# Patient Record
Sex: FEMALE | Race: OTHER | Employment: UNEMPLOYED | URBAN - METROPOLITAN AREA
[De-identification: names, ages, dates, MRNs, and addresses within clinical notes are randomized per-mention and may not be internally consistent; named-entity substitution may affect disease eponyms.]

---

## 2016-09-16 LAB
ANTIBODY SCREEN, EXTERNAL: NEGATIVE
CHLAMYDIA, EXTERNAL: NEGATIVE
HBSAG, EXTERNAL: NEGATIVE
HIV, EXTERNAL: NEGATIVE
N. GONORRHEA, EXTERNAL: NEGATIVE
RPR, EXTERNAL: NORMAL
RUBELLA, EXTERNAL: NORMAL
TYPE, ABO & RH, EXTERNAL: NORMAL

## 2017-04-07 ENCOUNTER — HOSPITAL ENCOUNTER (OUTPATIENT)
Age: 32
Setting detail: OBSERVATION
Discharge: HOME | DRG: 540 | End: 2017-04-10
Attending: OBSTETRICS & GYNECOLOGY | Admitting: STUDENT IN AN ORGANIZED HEALTH CARE EDUCATION/TRAINING PROGRAM
Payer: MEDICAID

## 2017-04-07 LAB
ALBUMIN SERPL BCP-MCNC: 2.3 G/DL (ref 3.5–5)
ALBUMIN/GLOB SERPL: 0.6 {RATIO} (ref 1.1–2.2)
ALP SERPL-CCNC: 146 U/L (ref 45–117)
ALT SERPL-CCNC: 12 U/L (ref 12–78)
ANION GAP BLD CALC-SCNC: 12 MMOL/L (ref 5–15)
AST SERPL W P-5'-P-CCNC: 13 U/L (ref 15–37)
BILIRUB SERPL-MCNC: 0.1 MG/DL (ref 0.2–1)
BUN SERPL-MCNC: 6 MG/DL (ref 6–20)
BUN/CREAT SERPL: 10 (ref 12–20)
CALCIUM SERPL-MCNC: 8.2 MG/DL (ref 8.5–10.1)
CHLORIDE SERPL-SCNC: 106 MMOL/L (ref 97–108)
CO2 SERPL-SCNC: 22 MMOL/L (ref 21–32)
CREAT SERPL-MCNC: 0.6 MG/DL (ref 0.55–1.02)
CREAT UR-MCNC: 55.38 MG/DL
ERYTHROCYTE [DISTWIDTH] IN BLOOD BY AUTOMATED COUNT: 14.7 % (ref 11.5–14.5)
GLOBULIN SER CALC-MCNC: 3.6 G/DL (ref 2–4)
GLUCOSE BLD STRIP.AUTO-MCNC: 111 MG/DL (ref 65–100)
GLUCOSE SERPL-MCNC: 111 MG/DL (ref 65–100)
GRBS, EXTERNAL: NEGATIVE
HCT VFR BLD AUTO: 33.8 % (ref 35–47)
HGB BLD-MCNC: 10.8 G/DL (ref 11.5–16)
MCH RBC QN AUTO: 26.4 PG (ref 26–34)
MCHC RBC AUTO-ENTMCNC: 32 G/DL (ref 30–36.5)
MCV RBC AUTO: 82.6 FL (ref 80–99)
PLATELET # BLD AUTO: 211 K/UL (ref 150–400)
POTASSIUM SERPL-SCNC: 3.8 MMOL/L (ref 3.5–5.1)
PROT SERPL-MCNC: 5.9 G/DL (ref 6.4–8.2)
PROT UR-MCNC: 20 MG/DL (ref 0–11.9)
PROT/CREAT UR-RTO: 0.4
RBC # BLD AUTO: 4.09 M/UL (ref 3.8–5.2)
SERVICE CMNT-IMP: ABNORMAL
SODIUM SERPL-SCNC: 140 MMOL/L (ref 136–145)
WBC # BLD AUTO: 9.9 K/UL (ref 3.6–11)

## 2017-04-07 PROCEDURE — 82570 ASSAY OF URINE CREATININE: CPT | Performed by: STUDENT IN AN ORGANIZED HEALTH CARE EDUCATION/TRAINING PROGRAM

## 2017-04-07 PROCEDURE — 77030033269 HC SLV COMPR SCD KNE2 CARD -B

## 2017-04-07 PROCEDURE — 85027 COMPLETE CBC AUTOMATED: CPT | Performed by: OBSTETRICS & GYNECOLOGY

## 2017-04-07 PROCEDURE — 84156 ASSAY OF PROTEIN URINE: CPT | Performed by: STUDENT IN AN ORGANIZED HEALTH CARE EDUCATION/TRAINING PROGRAM

## 2017-04-07 PROCEDURE — 74011250636 HC RX REV CODE- 250/636: Performed by: STUDENT IN AN ORGANIZED HEALTH CARE EDUCATION/TRAINING PROGRAM

## 2017-04-07 PROCEDURE — 99218 HC RM OBSERVATION: CPT

## 2017-04-07 PROCEDURE — 82962 GLUCOSE BLOOD TEST: CPT

## 2017-04-07 PROCEDURE — 74011250637 HC RX REV CODE- 250/637: Performed by: STUDENT IN AN ORGANIZED HEALTH CARE EDUCATION/TRAINING PROGRAM

## 2017-04-07 PROCEDURE — 80053 COMPREHEN METABOLIC PANEL: CPT | Performed by: OBSTETRICS & GYNECOLOGY

## 2017-04-07 PROCEDURE — 84156 ASSAY OF PROTEIN URINE: CPT | Performed by: OBSTETRICS & GYNECOLOGY

## 2017-04-07 PROCEDURE — 36415 COLL VENOUS BLD VENIPUNCTURE: CPT | Performed by: OBSTETRICS & GYNECOLOGY

## 2017-04-07 PROCEDURE — 96372 THER/PROPH/DIAG INJ SC/IM: CPT | Performed by: OBSTETRICS & GYNECOLOGY

## 2017-04-07 PROCEDURE — 74011636637 HC RX REV CODE- 636/637: Performed by: STUDENT IN AN ORGANIZED HEALTH CARE EDUCATION/TRAINING PROGRAM

## 2017-04-07 RX ORDER — ZOLPIDEM TARTRATE 5 MG/1
5 TABLET ORAL
Status: DISCONTINUED | OUTPATIENT
Start: 2017-04-07 | End: 2017-04-10 | Stop reason: HOSPADM

## 2017-04-07 RX ORDER — BETAMETHASONE SODIUM PHOSPHATE AND BETAMETHASONE ACETATE 3; 3 MG/ML; MG/ML
12 INJECTION, SUSPENSION INTRA-ARTICULAR; INTRALESIONAL; INTRAMUSCULAR; SOFT TISSUE EVERY 24 HOURS
Status: COMPLETED | OUTPATIENT
Start: 2017-04-07 | End: 2017-04-08

## 2017-04-07 RX ORDER — SODIUM CHLORIDE, SODIUM LACTATE, POTASSIUM CHLORIDE, CALCIUM CHLORIDE 600; 310; 30; 20 MG/100ML; MG/100ML; MG/100ML; MG/100ML
125 INJECTION, SOLUTION INTRAVENOUS CONTINUOUS
Status: DISCONTINUED | OUTPATIENT
Start: 2017-04-07 | End: 2017-04-10 | Stop reason: HOSPADM

## 2017-04-07 RX ORDER — ADHESIVE BANDAGE
30 BANDAGE TOPICAL DAILY PRN
Status: DISCONTINUED | OUTPATIENT
Start: 2017-04-07 | End: 2017-04-10 | Stop reason: HOSPADM

## 2017-04-07 RX ORDER — ACETAMINOPHEN 325 MG/1
650 TABLET ORAL
Status: DISCONTINUED | OUTPATIENT
Start: 2017-04-07 | End: 2017-04-10 | Stop reason: HOSPADM

## 2017-04-07 RX ADMIN — ZOLPIDEM TARTRATE 5 MG: 5 TABLET, FILM COATED ORAL at 22:12

## 2017-04-07 RX ADMIN — SODIUM CHLORIDE, SODIUM LACTATE, POTASSIUM CHLORIDE, AND CALCIUM CHLORIDE 125 ML/HR: 600; 310; 30; 20 INJECTION, SOLUTION INTRAVENOUS at 16:57

## 2017-04-07 RX ADMIN — HUMAN INSULIN 20 UNITS: 100 INJECTION, SUSPENSION SUBCUTANEOUS at 22:11

## 2017-04-07 RX ADMIN — BETAMETHASONE ACETATE AND BETAMETHASONE SODIUM PHOSPHATE 12 MG: 3; 3 INJECTION, SUSPENSION INTRA-ARTICULAR; INTRALESIONAL; INTRAMUSCULAR; SOFT TISSUE at 19:16

## 2017-04-07 NOTE — IP AVS SNAPSHOT
[address] 
[phone] Patient: Cali Nino MRN: [id number] :1985 You are allergic to the following Allergen Reactions Chlorzoxazone Hives Keflex (Cephalexin) Hives Penicillins Hives Recent Documentation Height Weight Breastfeeding? BMI OB Status Smoking Status 1.676 m 122.7 kg No 43.68 kg/m2 Pregnant Never Smoker Unresulted Labs Order Current Status SAMPLE TO BLOOD BANK In process SAMPLE TO BLOOD BANK In process Emergency Contacts Name Discharge Info Relation Home Work Mobile Kitty Baca  Friend [5] [phone] About your hospitalization You were admitted on:  2017 You last received care in the:  [facility] 2 LABOR & DELIVERY You were discharged on:  April 10, 2017 Unit phone number:  [phone] Why you were hospitalized Your primary diagnosis was:  Not on File Your diagnoses also included:  Pregnancy Providers Seen During Your Hospitalizations Provider Role Specialty Primary office phone Jean-Claude Joe MD Attending Provider Obstetrics & Gynecology [phone] Your Primary Care Physician (PCP) Primary Care Physician Office Phone Office Fax OTHER, PHYS ** None ** ** None ** Follow-up Information Follow up With Details Comments Contact Info Ba Arzate MD   Patient can only remember the practice name and not the physician Current Discharge Medication List  
  
ASK your doctor about these medications Dose & Instructions Dispensing Information Comments Morning Noon Evening Bedtime  
 albuterol 90 mcg/actuation inhaler Commonly known as:  PROVENTIL HFA, VENTOLIN HFA, PROAIR HFA Your last dose was: Your next dose is: Take  by inhalation. Refills:  0 HumuLIN N Pen 100 unit/mL (3 mL) Inpn Generic drug:  insulin NPH Your last dose was: Your next dose is:    
   
   
 Dose:  20 Units 20 Units by SubCUTAneous route nightly. Indications: Gestational Diabetes Mellitus Refills:  0  
     
   
   
   
  
 pnv w/o calcium-iron fum-fa 27-1 mg Tab Your last dose was: Your next dose is: Take  by mouth. Refills:  0 Discharge Instructions Home Blood Glucose Test: About This Test 
What is it? A home blood glucose test measures the amount of a type of sugar, called glucose, in your blood. Why is this test done? People who have diabetes need to check the amount of glucose in their blood. A home blood glucose test is an easy way to test your blood at home or when you are away from home. The results help you know when to take action to keep your blood glucose levels in a target range. How can you prepare for the test? 
· Check the expiration date on the bottle of testing strips. Do not use test strips that have . · Match the code number on the testing strips bottle with the number on the meter. If the numbers do not match, follow the directions with the meter for changing the code number. What happens before the test? 
The supplies you will need for testing blood glucose include: · A blood glucose meter. · Testing strips. These are made to be used with a specific model of meter. · Sugar control solutions. Some meters require a specific solution. Many new meters are made to operate without a control solution. · Short needles called lancets for pricking your skin. · A pen-sized cruz for the lancet (lancet device), which positions the lancet and controls how deeply it goes into your skin. · Clean cotton balls. These are used to stop the bleeding from the testing site.  
What happens during the test? 
 A home blood glucose test involves pricking your finger, palm, or forearm with a lancet to collect a drop of blood. The blood drop is placed on a test strip, which you insert into the blood glucose meter. The instructions for testing are slightly different for each blood glucose meter model. Follow the instructions that came with your meter. · Wash your hands with warm, soapy water. Dry them well with a clean towel. You may also use an alcohol wipe to clean your finger or other site, but make sure your hands are dry before the test. 
· Insert a clean lancet into the lancet device. · Remove a test strip from the test strip bottle. Replace the lid immediately to keep moisture away from the other strips. · Follow the instructions that came with your meter to get it ready. · Use the lancet device to stick the side of your fingertip with the lancet. Do not stick the tip of your finger. Some blood sugar meters use lancet devices that take the blood sample from other sites, such as the palm of the hand or the forearm. But the finger is usually the most accurate place to test blood sugar. · Put a drop of blood on the correct spot on the test strip. · Apply pressure with a clean cotton ball to stop the bleeding. · Follow the directions that came with the meter to get the results. · Write down the results and the time that you tested your blood. Some meters will store the results for you. What else should you know about the test? 
The American Diabetes Association (ADA) recommends that you stay within the following blood glucose level ranges. But depending on your health, you and your doctor may set a different range for you. For nonpregnant adults with diabetes: 
· 80 milligrams per deciliter (mg/dL) to 130 mg/dL before a meal 
· Less than 180 mg/dL 1 to 2 hours after a meal 
For women who have diabetes related to pregnancy (gestational diabetes): · 95 mg/dL or less before breakfast 
 · 120 to 140 mg/dL (or lower) 1 to 2 hours after a meal 
How long does the test take? · The blood glucose meter will show the results of the test in a minute or less. Where can you learn more? Go to http://adrianne-soheila.info/. Enter S564 in the search box to learn more about \"Home Blood Glucose Test: About This Test.\" Current as of: May 23, 2016 Content Version: 11.2 © 7001-7407 [employer]. Care instructions adapted under license by "[employer] (which disclaims liability or warranty for this information). If you have questions about a medical condition or this instruction, always ask your healthcare professional. [place] [address] any warranty or liability for your use of this information. Learning About When to Call Your Doctor During Pregnancy (After 20 Weeks) Your Care Instructions It's common to have concerns about what might be a problem during pregnancy. Although most pregnant women don't have any serious problems, it's important to know when to call your doctor if you have certain symptoms or signs of labor. These are general suggestions. Your doctor may give you some more information about when to call. When to call your doctor (after 20 weeks) Call 911 anytime you think you may need emergency care. For example, call if: 
· You have severe vaginal bleeding. · You have sudden, severe pain in your belly. · You passed out (lost consciousness). · You have a seizure. · You see or feel the umbilical cord. · You think you are about to deliver your baby and can't make it safely to the hospital. 
Call your doctor now or seek immediate medical care if: 
· You have vaginal bleeding. · You have belly pain. · You have a fever. · You have symptoms of preeclampsia, such as: 
¨ Sudden swelling of your face, hands, or feet. ¨ New vision problems (such as dimness or blurring). ¨ A severe headache. · You have a sudden release of fluid from your vagina. (You think your water broke.) · You think that you may be in labor. This means that you've had at least 4 contractions within 20 minutes or at least 8 contractions in an hour. · You notice that your baby has stopped moving or is moving much less than normal. 
· You have symptoms of a urinary tract infection. These may include: 
¨ Pain or burning when you urinate. ¨ A frequent need to urinate without being able to pass much urine. ¨ Pain in the flank, which is just below the rib cage and above the waist on either side of the back. ¨ Blood in your urine. Watch closely for changes in your health, and be sure to contact your doctor if: 
· You have vaginal discharge that smells bad. · You have skin changes, such as: ¨ A rash. ¨ Itching. ¨ Yellow color to your skin. · You have other concerns about your pregnancy. If you have labor signs at 37 weeks or more If you have signs of labor at 37 weeks or more, your doctor may tell you to call when your labor becomes more active. Symptoms of active labor include: 
· Contractions that are regular. · Contractions that are less than 5 minutes apart. · Contractions that are hard to talk through. Follow-up care is a key part of your treatment and safety. Be sure to make and go to all appointments, and call your doctor if you are having problems. It's also a good idea to know your test results and keep a list of the medicines you take. Where can you learn more? Go to http://adrianne-soheila.info/. Enter  in the search box to learn more about \"Learning About When to Call Your Doctor During Pregnancy (After 20 Weeks). \" 
Current as of: May 30, 2016 Content Version: 11.2 © 9652-9640 [employer]. Care instructions adapted under license by [employer] (which disclaims liability or warranty for this information).  If you have questions about a medical condition or this instruction, always ask your healthcare professional. [place] [address] any warranty or liability for your use of this information. Preeclampsia: Care Instructions Your Care Instructions Preeclampsia occurs when a woman's blood pressure rises during pregnancy. Often with preeclampsia, you also have swelling in your legs, hands, and face. A test may show too much protein in your urine. Preeclampsia is also called toxemia. If preeclampsia is severe and not treated, it can lead to seizures (eclampsia) and damage to your liver or kidneys. Preeclampsia can prevent your baby from getting enough food and oxygen. This can cause a low birth weight or other problems. Your doctor will watch you closely to prevent these problems. He or she also may recommend that you rest in bed most of the day. If your preeclampsia is a danger to your health or the health of your baby, your doctor may need to deliver your baby early. While preeclampsia is a concern, most women with preeclampsia have healthy babies. After a woman gives birth, preeclampsia usually goes away on its own. Follow-up care is a key part of your treatment and safety. Be sure to make and go to all appointments, and call your doctor if you are having problems. It's also a good idea to know your test results and keep a list of the medicines you take. How can you care for yourself at home? · Take and record your blood pressure at home if your doctor tells you to. ¨ Learn the importance of the two measures of blood pressure (such as 120 over 80, or 120/80). The first number is the systolic pressure, which is the force of blood on the artery walls as the heart pumps. The second number is the diastolic pressure, which is the force of blood on the artery walls between heartbeats, when the heart is at rest. You have a choice of monitors to use. ¨ Manual monitor: You pump up the cuff and use a stethoscope to listen for your pulse. ¨ Electronic monitor: The cuff inflates, and a gauge shows your pulse rate. ¨ To take your blood pressure: ¨ Ask your doctor to check your blood pressure monitor to be sure that it is accurate and that the cuff fits you. Also ask your doctor to watch you to make sure that you are using it right. ¨ You should not eat, use tobacco products, or use medicine known to raise blood pressure (such as some nasal decongestant sprays) before you take your blood pressure. ¨ Avoid taking your blood pressure if you have just exercised or are nervous or upset. Rest at least 15 minutes before you take your blood pressure. · If your doctor advises, check the protein levels in your urine. Your doctor or nurse will show you how to do this. · Take your medicines exactly as prescribed. Call your doctor if you think you are having a problem with your medicine. · Do not smoke. Quitting smoking will help lower your blood pressure and improve your baby's growth and health. If you need help quitting, talk to your doctor about stop-smoking programs and medicines. These can increase your chances of quitting for good. · Eat a balanced and healthy diet that has lots of fruits and vegetables. · If your doctor advised bed rest, be sure to stay off your feet and rest as much as possible. ¨ Keep a phone, phone book, notepad, and pen near the bed where you can easily reach them. ¨ Gently stretch your legs every hour to maintain good blood flow. ¨ Have another family member pack snacks and lunch food in a cooler close to your bed. ¨ Use this time for activities that you usually cannot find time for, such as reading, craft projects, or letter writing. · You can keep track of your baby's health by noting the length of time it takes to count 10 movements (such as kicks, flutters, or rolls).  Feeling 10 movements in less than 1 hour is considered normal. Track your baby's movements once each day, and bring this record with you to each prenatal visit. When should you call for help? Call 911 anytime you think you may need emergency care. For example, call if: 
· You have a seizure. · You passed out (lost consciousness). · You have severe vaginal bleeding. · You have severe pain in your belly or pelvis. · You have had fluid gushing or leaking from your vagina and you know or think the umbilical cord is bulging into your vagina. If this happens, immediately get down on your knees so your rear end (buttocks) is higher than your head. This will decrease the pressure on the cord until help arrives. Call your doctor now or seek immediate medical care if: 
· You have sudden swelling of your face, hands, or feet. · You have new vision problems (such as dimness or blurring). · You have a severe headache. · You have any vaginal bleeding. · You have belly pain or cramping. · You have a fever. · You have had regular contractions (with or without pain) for an hour. This means that you have 8 or more within 1 hour or 4 or more in 20 minutes after you change your position and drink fluids. · You have a sudden release of fluid from the vagina. · You have low back pain or pelvic pressure that does not go away. · You notice that your baby has stopped moving or is moving much less than normal. 
Watch closely for changes in your health, and be sure to contact your doctor if you have any questions. Where can you learn more? Go to http://adrianne-soheila.info/. Enter R814 in the search box to learn more about \"Preeclampsia: Care Instructions. \" Current as of: May 30, 2016 Content Version: 11.2 © 0537-6539 [employer]. Care instructions adapted under license by [employer] (which disclaims liability or warranty for this information).  If you have questions about a medical condition or this instruction, always ask your healthcare professional. [place] [address] any warranty or liability for your use of this information. Discharge Orders None Introducing [facility]! Pablo Mcbride introduces Appuri patient portal. Now you can access parts of your medical record, email your doctor's office, and request medication refills online. 1. In your internet browser, go to [email or web address] 2. Click on the First Time User? Click Here link in the Sign In box. You will see the New Member Sign Up page. 3. Enter your Appuri Access Code exactly as it appears below. You will not need to use this code after youve completed the sign-up process. If you do not sign up before the expiration date, you must request a new code. · Appuri Access Code: [email or web address] Expires: 6/27/2017 10:20 AM 
 
4. Enter the last four digits of your Social Security Number (xxxx) and Date of Birth (mm/dd/yyyy) as indicated and click Submit. You will be taken to the next sign-up page. 5. Create a Appuri ID. This will be your Appuri login ID and cannot be changed, so think of one that is secure and easy to remember. 6. Create a Appuri password. You can change your password at any time. 7. Enter your Password Reset Question and Answer. This can be used at a later time if you forget your password. 8. Enter your e-mail address. You will receive e-mail notification when new information is available in [address]. 9. Click Sign Up. You can now view and download portions of your medical record. 10. Click the Download Summary menu link to download a portable copy of your medical information. If you have questions, please visit the Frequently Asked Questions section of the Appuri website. Remember, Appuri is NOT to be used for urgent needs. For medical emergencies, dial 911. Now available from your iPhone and Android! General Information Please provide this summary of care documentation to your next provider. Patient Signature:  ____________________________________________________________ Date:  ____________________________________________________________  
  
[place] Favorite Provider Signature:  ____________________________________________________________ Date:  ____________________________________________________________

## 2017-04-07 NOTE — IP AVS SNAPSHOT
Summary of Care Report The Summary of Care report has been created to help improve care coordination. Users with access to [employer] or [address] (Web-based application) may access additional patient information including the Discharge Summary. If you are not currently a [address] user and need more information, please call the number listed below in the [place] [address] section and ask to be connected with Medical Records. Facility Information Name Address Phone [address] [facility] [address] [phone] Patient Information Patient Name Sex  Yumiko Sheridan ([id number]) Female 1985 Discharge Information Admitting Provider Service Area Unit Trudi Garcia,  / [phone] [facility] 2 Labor & Delivery / [phone] Discharge Provider Discharge Date/Time Discharge Disposition Destination (none) 4/10/2017 Midday (Pending) AHR (none) Patient Language Language ENGLISH [13] Hospital Problems as of 4/10/2017  Never Reviewed Class Noted - Resolved Last Modified POA Active Problems Pregnancy  2017 - Present 2017 by Trudi Garcia, DO Unknown Entered by Trudi Garcia, DO You are allergic to the following Allergen Reactions Chlorzoxazone Hives Keflex (Cephalexin) Hives Penicillins Hives Current Discharge Medication List  
  
ASK your doctor about these medications Dose & Instructions Dispensing Information Comments  
 albuterol 90 mcg/actuation inhaler Commonly known as:  PROVENTIL HFA, VENTOLIN HFA, PROAIR HFA Take  by inhalation. Refills:  0 HumuLIN N Pen 100 unit/mL (3 mL) Inpn Generic drug:  insulin NPH Dose:  20 Units 20 Units by SubCUTAneous route nightly.  Indications: Gestational Diabetes Mellitus Refills:  0  
   
 pnv w/o calcium-iron fum-fa 27-1 mg Tab Take  by mouth. Refills:  0 Follow-up Information Follow up With Details Comments Contact Anisa Arzate MD   Patient can only remember the practice name and not the physician Discharge Instructions Home Blood Glucose Test: About This Test 
What is it? A home blood glucose test measures the amount of a type of sugar, called glucose, in your blood. Why is this test done? People who have diabetes need to check the amount of glucose in their blood. A home blood glucose test is an easy way to test your blood at home or when you are away from home. The results help you know when to take action to keep your blood glucose levels in a target range. How can you prepare for the test? 
· Check the expiration date on the bottle of testing strips. Do not use test strips that have . · Match the code number on the testing strips bottle with the number on the meter. If the numbers do not match, follow the directions with the meter for changing the code number. What happens before the test? 
The supplies you will need for testing blood glucose include: · A blood glucose meter. · Testing strips. These are made to be used with a specific model of meter. · Sugar control solutions. Some meters require a specific solution. Many new meters are made to operate without a control solution. · Short needles called lancets for pricking your skin. · A pen-sized cruz for the lancet (lancet device), which positions the lancet and controls how deeply it goes into your skin. · Clean cotton balls. These are used to stop the bleeding from the testing site. What happens during the test? 
A home blood glucose test involves pricking your finger, palm, or forearm with a lancet to collect a drop of blood. The blood drop is placed on a test strip, which you insert into the blood glucose meter.  The instructions for testing are slightly different for each blood glucose meter model. Follow the instructions that came with your meter. · Wash your hands with warm, soapy water. Dry them well with a clean towel. You may also use an alcohol wipe to clean your finger or other site, but make sure your hands are dry before the test. 
· Insert a clean lancet into the lancet device. · Remove a test strip from the test strip bottle. Replace the lid immediately to keep moisture away from the other strips. · Follow the instructions that came with your meter to get it ready. · Use the lancet device to stick the side of your fingertip with the lancet. Do not stick the tip of your finger. Some blood sugar meters use lancet devices that take the blood sample from other sites, such as the palm of the hand or the forearm. But the finger is usually the most accurate place to test blood sugar. · Put a drop of blood on the correct spot on the test strip. · Apply pressure with a clean cotton ball to stop the bleeding. · Follow the directions that came with the meter to get the results. · Write down the results and the time that you tested your blood. Some meters will store the results for you. What else should you know about the test? 
The American Diabetes Association (ADA) recommends that you stay within the following blood glucose level ranges. But depending on your health, you and your doctor may set a different range for you. For nonpregnant adults with diabetes: 
· 80 milligrams per deciliter (mg/dL) to 130 mg/dL before a meal 
· Less than 180 mg/dL 1 to 2 hours after a meal 
For women who have diabetes related to pregnancy (gestational diabetes): · 95 mg/dL or less before breakfast 
· 120 to 140 mg/dL (or lower) 1 to 2 hours after a meal 
How long does the test take? · The blood glucose meter will show the results of the test in a minute or less. Where can you learn more? Go to http://adrianne-soheila.info/. Enter C929 in the search box to learn more about \"Home Blood Glucose Test: About This Test.\" Current as of: May 23, 2016 Content Version: 11.2 © 6086-2128 [employer]. Care instructions adapted under license by [employer] (which disclaims liability or warranty for this information). If you have questions about a medical condition or this instruction, always ask your healthcare professional. [address] any warranty or liability for your use of this information. Learning About When to Call Your Doctor During Pregnancy (After 20 Weeks) Your Care Instructions It's common to have concerns about what might be a problem during pregnancy. Although most pregnant women don't have any serious problems, it's important to know when to call your doctor if you have certain symptoms or signs of labor. These are general suggestions. Your doctor may give you some more information about when to call. When to call your doctor (after 20 weeks) Call 911 anytime you think you may need emergency care. For example, call if: 
· You have severe vaginal bleeding. · You have sudden, severe pain in your belly. · You passed out (lost consciousness). · You have a seizure. · You see or feel the umbilical cord. · You think you are about to deliver your baby and can't make it safely to the hospital. 
Call your doctor now or seek immediate medical care if: 
· You have vaginal bleeding. · You have belly pain. · You have a fever. · You have symptoms of preeclampsia, such as: 
¨ Sudden swelling of your face, hands, or feet. ¨ New vision problems (such as dimness or blurring). ¨ A severe headache. · You have a sudden release of fluid from your vagina. (You think your water broke.) · You think that you may be in labor. This means that you've had at least 4 contractions within 20 minutes or at least 8 contractions in an hour. · You notice that your baby has stopped moving or is moving much less than normal. 
· You have symptoms of a urinary tract infection. These may include: 
¨ Pain or burning when you urinate. ¨ A frequent need to urinate without being able to pass much urine. ¨ Pain in the flank, which is just below the rib cage and above the waist on either side of the back. ¨ Blood in your urine. Watch closely for changes in your health, and be sure to contact your doctor if: 
· You have vaginal discharge that smells bad. · You have skin changes, such as: ¨ A rash. ¨ Itching. ¨ Yellow color to your skin. · You have other concerns about your pregnancy. If you have labor signs at 37 weeks or more If you have signs of labor at 37 weeks or more, your doctor may tell you to call when your labor becomes more active. Symptoms of active labor include: 
· Contractions that are regular. · Contractions that are less than 5 minutes apart. · Contractions that are hard to talk through. Follow-up care is a key part of your treatment and safety. Be sure to make and go to all appointments, and call your doctor if you are having problems. It's also a good idea to know your test results and keep a list of the medicines you take. Where can you learn more? Go to http://adrianne-soheila.info/. Enter  in the search box to learn more about \"Learning About When to Call Your Doctor During Pregnancy (After 20 Weeks). \" 
Current as of: May 30, 2016 Content Version: 11.2 © 9219-1844 [employer]. Care instructions adapted under license by [employer] (which disclaims liability or warranty for this information). If you have questions about a medical condition or this instruction, always ask your healthcare professional. [place] [address] any warranty or liability for your use of this information. Preeclampsia: Care Instructions Your Care Instructions Preeclampsia occurs when a woman's blood pressure rises during pregnancy. Often with preeclampsia, you also have swelling in your legs, hands, and face. A test may show too much protein in your urine. Preeclampsia is also called toxemia. If preeclampsia is severe and not treated, it can lead to seizures (eclampsia) and damage to your liver or kidneys. Preeclampsia can prevent your baby from getting enough food and oxygen. This can cause a low birth weight or other problems. Your doctor will watch you closely to prevent these problems. He or she also may recommend that you rest in bed most of the day. If your preeclampsia is a danger to your health or the health of your baby, your doctor may need to deliver your baby early. While preeclampsia is a concern, most women with preeclampsia have healthy babies. After a woman gives birth, preeclampsia usually goes away on its own. Follow-up care is a key part of your treatment and safety. Be sure to make and go to all appointments, and call your doctor if you are having problems. It's also a good idea to know your test results and keep a list of the medicines you take. How can you care for yourself at home? · Take and record your blood pressure at home if your doctor tells you to. ¨ Learn the importance of the two measures of blood pressure (such as 120 over 80, or 120/80). The first number is the systolic pressure, which is the force of blood on the artery walls as the heart pumps. The second number is the diastolic pressure, which is the force of blood on the artery walls between heartbeats, when the heart is at rest. You have a choice of monitors to use. ¨ Manual monitor: You pump up the cuff and use a stethoscope to listen for your pulse. ¨ Electronic monitor: The cuff inflates, and a gauge shows your pulse rate. ¨ To take your blood pressure: ¨ Ask your doctor to check your blood pressure monitor to be sure that it is accurate and that the cuff fits you. Also ask your doctor to watch you to make sure that you are using it right. ¨ You should not eat, use tobacco products, or use medicine known to raise blood pressure (such as some nasal decongestant sprays) before you take your blood pressure. ¨ Avoid taking your blood pressure if you have just exercised or are nervous or upset. Rest at least 15 minutes before you take your blood pressure. · If your doctor advises, check the protein levels in your urine. Your doctor or nurse will show you how to do this. · Take your medicines exactly as prescribed. Call your doctor if you think you are having a problem with your medicine. · Do not smoke. Quitting smoking will help lower your blood pressure and improve your baby's growth and health. If you need help quitting, talk to your doctor about stop-smoking programs and medicines. These can increase your chances of quitting for good. · Eat a balanced and healthy diet that has lots of fruits and vegetables. · If your doctor advised bed rest, be sure to stay off your feet and rest as much as possible. ¨ Keep a phone, phone book, notepad, and pen near the bed where you can easily reach them. ¨ Gently stretch your legs every hour to maintain good blood flow. ¨ Have another family member pack snacks and lunch food in a cooler close to your bed. ¨ Use this time for activities that you usually cannot find time for, such as reading, craft projects, or letter writing. · You can keep track of your baby's health by noting the length of time it takes to count 10 movements (such as kicks, flutters, or rolls). Feeling 10 movements in less than 1 hour is considered normal. Track your baby's movements once each day, and bring this record with you to each prenatal visit. When should you call for help? Call 911 anytime you think you may need emergency care. For example, call if: 
· You have a seizure. · You passed out (lost consciousness). · You have severe vaginal bleeding. · You have severe pain in your belly or pelvis. · You have had fluid gushing or leaking from your vagina and you know or think the umbilical cord is bulging into your vagina. If this happens, immediately get down on your knees so your rear end (buttocks) is higher than your head. This will decrease the pressure on the cord until help arrives. Call your doctor now or seek immediate medical care if: 
· You have sudden swelling of your face, hands, or feet. · You have new vision problems (such as dimness or blurring). · You have a severe headache. · You have any vaginal bleeding. · You have belly pain or cramping. · You have a fever. · You have had regular contractions (with or without pain) for an hour. This means that you have 8 or more within 1 hour or 4 or more in 20 minutes after you change your position and drink fluids. · You have a sudden release of fluid from the vagina. · You have low back pain or pelvic pressure that does not go away. · You notice that your baby has stopped moving or is moving much less than normal. 
Watch closely for changes in your health, and be sure to contact your doctor if you have any questions. Where can you learn more? Go to http://adrianne-soheila.info/. Enter B887 in the search box to learn more about \"Preeclampsia: Care Instructions. \" Current as of: May 30, 2016 Content Version: 11.2 © 3035-3521 [employer]. Care instructions adapted under license by [employer] (which disclaims liability or warranty for this information). If you have questions about a medical condition or this instruction, always ask your healthcare professional. [place] [address] any warranty or liability for your use of this information. Chart Review Routing History No Routing History on File

## 2017-04-07 NOTE — PROGRESS NOTES
Pt here for elevated blood pressure in office, pt placed on efm    1542 pt up to bathroom and voided, adjusting efm several times, difficult to monitor due to fetal movement. Pt states she has had very little water today and has not eaten, and has not taken her blood sugar today    1648 prolonged deceleration noted, pt turned to left side.     1651 iv fluid bolus started    1705 Dr. Sol Mail of prolonged deceleration and interventions performed, baseline 135 at this time    1835 Dr. Geovanni Armstrong in and discussing plan of care with pt, aware pt was laying on b/p cuff x 3 blood pressures and b/p cuff was readjusted and b/p retaken    1852 bedside sbar report tammi Leal rnc

## 2017-04-07 NOTE — H&P
Obstetrics Admission H&P    Jaylon Chung  [id number]  1985    Chief Complaint:  Pregnancy and Elevated BPs     HPI: 33 y.o. female  36w0d with Estimated Date of Delivery: 17  Pregnancy has been complicated by GDM on NPH at bedtime and obesity. Patient presented to the office today for routine 36wk appt, BPs noted to be elevated in the mild range. She was sent to L&D for monitoring. Patient denies headache, vision changes, RUQ pain, epigastric pain, or worsening edema. Reports PP BGLs are WNL, only fasting elevated. ROS:  A comprehensive review of systems was negative except for that written in the HPI. OB History      Para Term  AB TAB SAB Ectopic Multiple Living    2 1 1               Past Medical History:   Diagnosis Date    Asthma     Diabetes ([facility] [address]     gestational diabetes    Essential hypertension     seen today for elevated blood pressure    Staph aureus infection 2006    over abdomen     Past Surgical History:   Procedure Laterality Date    BREAST SURGERY PROCEDURE UNLISTED      HX BREAST REDUCTION      HX CHOLECYSTECTOMY      HX TONSILLECTOMY       Social History     Social History    Marital status: SINGLE     Spouse name: N/A    Number of children: N/A    Years of education: N/A     Occupational History    Not on file. Social History Main Topics    Smoking status: Never Smoker    Smokeless tobacco: Not on file    Alcohol use No    Drug use: No    Sexual activity: Yes     Partners: Male     Other Topics Concern    Not on file     Social History Narrative     No family history on file. Allergies   Allergen Reactions    Chlorzoxazone Hives    Keflex [Cephalexin] Hives    Penicillins Hives     Prior to Admission Medications   Prescriptions Last Dose Informant Patient Reported? Taking? albuterol (PROVENTIL HFA, VENTOLIN HFA) 90 mcg/actuation inhaler Unknown at Unknown time  Yes No   Sig: Take  by inhalation.    insulin NPH (HUMULIN N PEN) 100 unit/mL (3 mL) inpn 2017 at Unknown time  Yes Yes   Si Units by SubCUTAneous route nightly. Indications: Gestational Diabetes Mellitus   pnv w/o calcium-iron fum-fa 27-1 mg tab 2017 at Unknown time  Yes Yes   Sig: Take  by mouth. Facility-Administered Medications: None         Vitals:  Patient Vitals for the past 8 hrs:   BP Temp Pulse Resp SpO2 Height Weight   17 1737 143/66 - 75 - - - -   17 1733 (!) 152/93 - 67 - 99 % - -   17 1718 (!) 153/92 - 76 - 100 % - -   17 1703 - - - - 99 % - -   17 1658 - - - - 99 % - -   17 1653 - - - - 99 % - -   17 1649 140/74 - 78 - - - -   17 1634 117/69 - 74 - - - -   17 1619 137/68 - 76 - - - -   17 1603 128/77 - 72 - - - -   17 1549 144/72 98.8 °F (37.1 °C) 76 16 - - -   17 1521 - - - - - 5' 6\" (1.676 m) 122.7 kg (270 lb 9.6 oz)     Temp (24hrs), Av.8 °F (37.1 °C), Min:98.8 °F (37.1 °C), Max:98.8 °F (37.1 °C)    I&O:                    Exam:  Patient without distress. Abdomen soft, non-tender               Fundus soft and non tender               Perineum No sign of blood or amniotic fluid               Lower extremities edema No           Cervical Exam:  In the office today by Dr. Milo Albrecht ftp dilated  Membranes:   Intact    Fetal Heart Rate: Reactive  Baseline: 135 per minute  Variability: moderate  Accelerations: yes  Decelerations: single prolonged decel to 80s x 3min -recovered with position changes  Uterine Activity: irritability          Labs:   Recent Results (from the past 24 hour(s))   PROTEIN/CREATININE RATIO, URINE    Collection Time: 17  3:40 PM   Result Value Ref Range    Protein, urine random 20 (H) 0.0 - 11.9 mg/dL    Creatinine, urine 55.38 mg/dL    Protein/Creat.  urine Ratio 0.4     CBC W/O DIFF    Collection Time: 17  3:40 PM   Result Value Ref Range    WBC 9.9 3.6 - 11.0 K/uL    RBC 4.09 3.80 - 5.20 M/uL    HGB 10.8 (L) 11.5 - 16.0 g/dL    HCT 33.8 (L) 35.0 - 47.0 %    MCV 82.6 80.0 - 99.0 FL    MCH 26.4 26.0 - 34.0 PG    MCHC 32.0 30.0 - 36.5 g/dL    RDW 14.7 (H) 11.5 - 14.5 %    PLATELET 704 905 - 422 K/uL   METABOLIC PANEL, COMPREHENSIVE    Collection Time: 17  3:40 PM   Result Value Ref Range    Sodium 140 136 - 145 mmol/L    Potassium 3.8 3.5 - 5.1 mmol/L    Chloride 106 97 - 108 mmol/L    CO2 22 21 - 32 mmol/L    Anion gap 12 5 - 15 mmol/L    Glucose 111 (H) 65 - 100 mg/dL    BUN 6 6 - 20 MG/DL    Creatinine 0.60 0.55 - 1.02 MG/DL    BUN/Creatinine ratio 10 (L) 12 - 20      GFR est AA >60 >60 ml/min/1.73m2    GFR est non-AA >60 >60 ml/min/1.73m2    Calcium 8.2 (L) 8.5 - 10.1 MG/DL    Bilirubin, total 0.1 (L) 0.2 - 1.0 MG/DL    ALT (SGPT) 12 12 - 78 U/L    AST (SGOT) 13 (L) 15 - 37 U/L    Alk. phosphatase 146 (H) 45 - 117 U/L    Protein, total 5.9 (L) 6.4 - 8.2 g/dL    Albumin 2.3 (L) 3.5 - 5.0 g/dL    Globulin 3.6 2.0 - 4.0 g/dL    A-G Ratio 0.6 (L) 1.1 - 2.2         Assessment and Plan:  32yo  at 36w 0d -rule in for Pre-E without severe features based on mild range BPs and Pr:Cr ratio of 0.4     1. Preeclampsia without severe features: continue observation of BPs  Complete 48 hour course of steroids to promote fetal lung maturity  Check 24 hour urine for total Protein  Continuous Fetal monitoring until further notice     2. GDM -random BGL 111mg/dL -continue NPH 20u at bedtime -diabetic diet, check BGLs fasting and 2hr PP TID   -May need sliding scale coverage due to [address] over next few days     Dispo: continue inpatient management of Pre-E without severe features for now, will plan to deliver for si/sx of severe Pre-E or fetal distress, otherwise will try to delay delivery until 37w    Discussed plan of care with patient, discussed possibility of need for induction, risks, benefit and alternatives. Questions answered.      Iris Loud, DO

## 2017-04-07 NOTE — IP AVS SNAPSHOT
Current Discharge Medication List  
  
ASK your doctor about these medications Dose & Instructions Dispensing Information Comments Morning Noon Evening Bedtime  
 albuterol 90 mcg/actuation inhaler Commonly known as:  PROVENTIL HFA, VENTOLIN HFA, PROAIR HFA Your last dose was: Your next dose is: Take  by inhalation. Refills:  0 HumuLIN N Pen 100 unit/mL (3 mL) Inpn Generic drug:  insulin NPH Your last dose was: Your next dose is:    
   
   
 Dose:  20 Units 20 Units by SubCUTAneous route nightly. Indications: Gestational Diabetes Mellitus Refills:  0  
     
   
   
   
  
 pnv w/o calcium-iron fum-fa 27-1 mg Tab Your last dose was: Your next dose is: Take  by mouth. Refills:  0

## 2017-04-08 PROBLEM — Z34.90 PREGNANCY: Status: ACTIVE | Noted: 2017-04-08

## 2017-04-08 LAB
COLLECT DURATION TIME UR: 24 HR
COLLECT DURATION TIME UR: 24 HR
CREAT 24H UR-MRATE: 1709 MG/24HR (ref 600–1800)
GLUCOSE BLD STRIP.AUTO-MCNC: 116 MG/DL (ref 65–100)
GLUCOSE BLD STRIP.AUTO-MCNC: 125 MG/DL (ref 65–100)
GLUCOSE BLD STRIP.AUTO-MCNC: 140 MG/DL (ref 65–100)
GLUCOSE BLD STRIP.AUTO-MCNC: 163 MG/DL (ref 65–100)
GLUCOSE BLD STRIP.AUTO-MCNC: 346 MG/DL (ref 65–100)
GLUCOSE BLD STRIP.AUTO-MCNC: 93 MG/DL (ref 65–100)
PROT 24H UR-MRATE: 552 MG/24HR
SERVICE CMNT-IMP: ABNORMAL
SERVICE CMNT-IMP: NORMAL
SPECIMEN VOL ?TM UR: 2400 ML
SPECIMEN VOL ?TM UR: 2400 ML

## 2017-04-08 PROCEDURE — 65270000029 HC RM PRIVATE

## 2017-04-08 PROCEDURE — 74011250636 HC RX REV CODE- 250/636: Performed by: STUDENT IN AN ORGANIZED HEALTH CARE EDUCATION/TRAINING PROGRAM

## 2017-04-08 PROCEDURE — 96372 THER/PROPH/DIAG INJ SC/IM: CPT

## 2017-04-08 PROCEDURE — 82962 GLUCOSE BLOOD TEST: CPT

## 2017-04-08 PROCEDURE — 74011250637 HC RX REV CODE- 250/637: Performed by: STUDENT IN AN ORGANIZED HEALTH CARE EDUCATION/TRAINING PROGRAM

## 2017-04-08 PROCEDURE — 99218 HC RM OBSERVATION: CPT

## 2017-04-08 PROCEDURE — 74011636637 HC RX REV CODE- 636/637: Performed by: STUDENT IN AN ORGANIZED HEALTH CARE EDUCATION/TRAINING PROGRAM

## 2017-04-08 RX ORDER — DEXTROSE 50 % IN WATER (D50W) INTRAVENOUS SYRINGE
12.5-25 AS NEEDED
Status: DISCONTINUED | OUTPATIENT
Start: 2017-04-08 | End: 2017-04-10 | Stop reason: HOSPADM

## 2017-04-08 RX ORDER — INSULIN LISPRO 100 [IU]/ML
INJECTION, SOLUTION INTRAVENOUS; SUBCUTANEOUS
Status: DISCONTINUED | OUTPATIENT
Start: 2017-04-08 | End: 2017-04-10 | Stop reason: HOSPADM

## 2017-04-08 RX ORDER — MAGNESIUM SULFATE 100 %
4 CRYSTALS MISCELLANEOUS AS NEEDED
Status: DISCONTINUED | OUTPATIENT
Start: 2017-04-08 | End: 2017-04-10 | Stop reason: HOSPADM

## 2017-04-08 RX ADMIN — INSULIN LISPRO 2 UNITS: 100 INJECTION, SOLUTION INTRAVENOUS; SUBCUTANEOUS at 11:31

## 2017-04-08 RX ADMIN — BETAMETHASONE ACETATE AND BETAMETHASONE SODIUM PHOSPHATE 12 MG: 3; 3 INJECTION, SUSPENSION INTRA-ARTICULAR; INTRALESIONAL; INTRAMUSCULAR; SOFT TISSUE at 19:03

## 2017-04-08 RX ADMIN — ZOLPIDEM TARTRATE 5 MG: 5 TABLET, FILM COATED ORAL at 22:14

## 2017-04-08 RX ADMIN — HUMAN INSULIN 6 UNITS: 100 INJECTION, SOLUTION SUBCUTANEOUS at 06:19

## 2017-04-08 RX ADMIN — HUMAN INSULIN 20 UNITS: 100 INJECTION, SUSPENSION SUBCUTANEOUS at 22:12

## 2017-04-08 NOTE — PROGRESS NOTES
AntePartum Progress Note    Bertrum Dose  [id number]    Patient admitted for Pre-E without severe features 36w1d Estimated Date of Delivery: 17 states she does not  have  headache , right upper quadrant pain   and swelling. Vitals:  Patient Vitals for the past 24 hrs:   BP Temp Pulse Resp SpO2 Height Weight   17 0820 118/63 98.1 °F (36.7 °C) 64 16 - - -   17 0817 115/61 - 69 - - - -   17 1930 - - - - 98 % - -   17 1927 159/78 98.2 °F (36.8 °C) 82 18 - - -   17 1737 143/66 - 75 - - - -   17 1733 (!) 152/93 - 67 - 99 % - -   17 1718 (!) 153/92 - 76 - 100 % - -   17 1703 - - - - 99 % - -   17 1658 - - - - 99 % - -   17 1653 - - - - 99 % - -   17 1649 140/74 - 78 - - - -   17 1634 117/69 - 74 - - - -   17 1619 137/68 - 76 - - - -   17 1603 128/77 - 72 - - - -   17 1549 144/72 98.8 °F (37.1 °C) 76 16 - - -   17 1521 - - - - - 5' 6\" (1.676 m) 122.7 kg (270 lb 9.6 oz)     Temp (24hrs), Av.4 °F (36.9 °C), Min:98.1 °F (36.7 °C), Max:98.8 °F (37.1 °C)    NST:  Reactive  Uterine Activity: None    Exam:  Patient without distress. Abdomen soft, non-tender               Fundus soft and non tender               Lower extremities edema No                         Labs:   Recent Results (from the past 24 hour(s))   PROTEIN/CREATININE RATIO, URINE    Collection Time: 17  3:40 PM   Result Value Ref Range    Protein, urine random 20 (H) 0.0 - 11.9 mg/dL    Creatinine, urine 55.38 mg/dL    Protein/Creat.  urine Ratio 0.4     CBC W/O DIFF    Collection Time: 17  3:40 PM   Result Value Ref Range    WBC 9.9 3.6 - 11.0 K/uL    RBC 4.09 3.80 - 5.20 M/uL    HGB 10.8 (L) 11.5 - 16.0 g/dL    HCT 33.8 (L) 35.0 - 47.0 %    MCV 82.6 80.0 - 99.0 FL    MCH 26.4 26.0 - 34.0 PG    MCHC 32.0 30.0 - 36.5 g/dL    RDW 14.7 (H) 11.5 - 14.5 %    PLATELET 039 182 - 971 K/uL   METABOLIC PANEL, COMPREHENSIVE    Collection Time: 17  3:40 PM   Result Value Ref Range    Sodium 140 136 - 145 mmol/L    Potassium 3.8 3.5 - 5.1 mmol/L    Chloride 106 97 - 108 mmol/L    CO2 22 21 - 32 mmol/L    Anion gap 12 5 - 15 mmol/L    Glucose 111 (H) 65 - 100 mg/dL    BUN 6 6 - 20 MG/DL    Creatinine 0.60 0.55 - 1.02 MG/DL    BUN/Creatinine ratio 10 (L) 12 - 20      GFR est AA >60 >60 ml/min/1.73m2    GFR est non-AA >60 >60 ml/min/1.73m2    Calcium 8.2 (L) 8.5 - 10.1 MG/DL    Bilirubin, total 0.1 (L) 0.2 - 1.0 MG/DL    ALT (SGPT) 12 12 - 78 U/L    AST (SGOT) 13 (L) 15 - 37 U/L    Alk. phosphatase 146 (H) 45 - 117 U/L    Protein, total 5.9 (L) 6.4 - 8.2 g/dL    Albumin 2.3 (L) 3.5 - 5.0 g/dL    Globulin 3.6 2.0 - 4.0 g/dL    A-G Ratio 0.6 (L) 1.1 - 2.2     GLUCOSE, POC    Collection Time: 17  8:16 PM   Result Value Ref Range    Glucose (POC) 111 (H) 65 - 100 mg/dL    Performed by TINY Fuchs    GLUCOSE, POC    Collection Time: 17  5:55 AM   Result Value Ref Range    Glucose (POC) 346 (H) 65 - 100 mg/dL    Performed by TINY Fuchs        Assessment and Plan:  32yo  with IUP @ 36w1d     32yo  at 36w 1d -rule in for Pre-E without severe features based on mild range BPs and Pr:Cr ratio of 0.4      1. Preeclampsia without severe features: BPs have been normotensive to mild range  continue observation of BPs  Complete 48 hour course of steroids to promote fetal lung maturity  Check 24 hour urine for total Protein  NST TID      2.  GDM -BGL elevated fasting 346 this AM after BMZ   -continue NPH 20u at bedtime -diabetic diet, check BGLs fasting and AC and 2hr PP TID   -SS lispro added     Dispo: continue inpatient management of Pre-E without severe features for now, will plan to deliver for si/sx of severe Pre-E or fetal distress, otherwise will try to delay delivery until 37w

## 2017-04-08 NOTE — PROGRESS NOTES
9480: Dr. Patsy Howard notified of patients FBS of 346. Orders received for insulin now. MD will put in additional orders later. 1711: SBAR report given to HERBERT Kim [facility]

## 2017-04-08 NOTE — PROGRESS NOTES
Report received from tammi Leal rn    1964 pt ate yogurt and toast/jelly for breakfast this am, yogurt was for a snack, pt states she does not like eggs. Pt is on gdm diet. [id number] Dr Brenda Juarez in, discussed plan of care with pt    1916 bedside sbar report given to lluvia Mays rn

## 2017-04-09 LAB
GLUCOSE BLD STRIP.AUTO-MCNC: 120 MG/DL (ref 65–100)
GLUCOSE BLD STRIP.AUTO-MCNC: 132 MG/DL (ref 65–100)
GLUCOSE BLD STRIP.AUTO-MCNC: 145 MG/DL (ref 65–100)
GLUCOSE BLD STRIP.AUTO-MCNC: 149 MG/DL (ref 65–100)
GLUCOSE BLD STRIP.AUTO-MCNC: 149 MG/DL (ref 65–100)
SERVICE CMNT-IMP: ABNORMAL

## 2017-04-09 PROCEDURE — 74011636637 HC RX REV CODE- 636/637: Performed by: STUDENT IN AN ORGANIZED HEALTH CARE EDUCATION/TRAINING PROGRAM

## 2017-04-09 PROCEDURE — 74011250637 HC RX REV CODE- 250/637: Performed by: STUDENT IN AN ORGANIZED HEALTH CARE EDUCATION/TRAINING PROGRAM

## 2017-04-09 PROCEDURE — 82962 GLUCOSE BLOOD TEST: CPT

## 2017-04-09 PROCEDURE — 65270000029 HC RM PRIVATE

## 2017-04-09 PROCEDURE — 99218 HC RM OBSERVATION: CPT

## 2017-04-09 RX ORDER — SODIUM CHLORIDE 0.9 % (FLUSH) 0.9 %
5-10 SYRINGE (ML) INJECTION EVERY 8 HOURS
Status: DISCONTINUED | OUTPATIENT
Start: 2017-04-09 | End: 2017-04-10 | Stop reason: HOSPADM

## 2017-04-09 RX ADMIN — HUMAN INSULIN 20 UNITS: 100 INJECTION, SUSPENSION SUBCUTANEOUS at 22:06

## 2017-04-09 RX ADMIN — INSULIN LISPRO 2 UNITS: 100 INJECTION, SOLUTION INTRAVENOUS; SUBCUTANEOUS at 15:06

## 2017-04-09 RX ADMIN — INSULIN LISPRO 2 UNITS: 100 INJECTION, SOLUTION INTRAVENOUS; SUBCUTANEOUS at 10:15

## 2017-04-09 RX ADMIN — Medication 10 ML: at 15:10

## 2017-04-09 RX ADMIN — ZOLPIDEM TARTRATE 5 MG: 5 TABLET, FILM COATED ORAL at 22:06

## 2017-04-09 NOTE — PROGRESS NOTES
AntePartum Progress Note    Gabriella Tamez  [id number]    Patient admitted for Pre-E without severe features 36w2d Estimated Date of Delivery: 17 states she does not  have  headache , right upper quadrant pain   and swelling. Vitals:    Patient Vitals for the past 24 hrs:   BP Pulse   17 0609 149/79 71   17 2212 142/65 70   17 1923 127/68 71     No data recorded. NST:  Reactive  Uterine Activity: None    Exam:  Patient without distress. Abdomen soft, non-tender               Fundus soft and non tender               Lower extremities edema No                         Labs:   Recent Results (from the past 24 hour(s))   GLUCOSE, POC    Collection Time: 17 11:19 AM   Result Value Ref Range    Glucose (POC) 140 (H) 65 - 100 mg/dL    Performed by Javier FaceRig    GLUCOSE, POC    Collection Time: 17 12:54 PM   Result Value Ref Range    Glucose (POC) 93 65 - 100 mg/dL    Performed by Javier FaceRig    GLUCOSE, POC    Collection Time: 17  3:27 PM   Result Value Ref Range    Glucose (POC) 116 (H) 65 - 100 mg/dL    Performed by Javier FaceRig    GLUCOSE, POC    Collection Time: 17  6:11 PM   Result Value Ref Range    Glucose (POC) 125 (H) 65 - 100 mg/dL    Performed by Javier FaceRig    GLUCOSE, POC    Collection Time: 17  8:40 PM   Result Value Ref Range    Glucose (POC) 163 (H) 65 - 100 mg/dL    Performed by Jem Age    GLUCOSE, POC    Collection Time: 17  6:11 AM   Result Value Ref Range    Glucose (POC) 132 (H) 65 - 100 mg/dL    Performed by Jem Age    GLUCOSE, POC    Collection Time: 17  7:56 AM   Result Value Ref Range    Glucose (POC) 120 (H) 65 - 100 mg/dL    Performed by Melecio [place] and Plan:  30yo  with IUP @ 36w2d     30yo  at 36w 2d -ruled in for Pre-E without severe features based on mild range BPs and 24hr urine protein 552mg     1.  Preeclampsia without severe features: BPs have been normotensive to mild range  continue observation of BPs  s/p 48 hour course of steroids to promote fetal lung maturity  NST TID   CBC and CMP normal on admission, repeat tomorrow AM   MFM for BPP and EFW tomorrow   GBS pending from the office on 4/7      2. GDM -BGL fasting 132 this AM  -continue NPH 20u at bedtime -diabetic diet, checking BGLs fasting and 2hr PP tid   -SS lispro coverage     Dispo: continue inpatient management of Pre-E without severe features for now, will plan to deliver for si/sx of severe Pre-E or fetal distress, otherwise will try to delay delivery until 37w (on books for Friday 4/14, coming in for osei bulb the night before).  May be candidate for outpatient management, will make decision after MFM evaluation tomorrow     Felix Alvarado DO

## 2017-04-09 NOTE — PROGRESS NOTES
Bedside and Verbal shift change report given to Mariela Bush (oncoming nurse) by Edgar De La Fuente (offgoing nurse). Report included the following information SBAR, MAR and Recent Results.

## 2017-04-09 NOTE — PROGRESS NOTES
2012  Called Dr. Vita Parikh to notify RN of pt's 24 hour urine result. Left message for MD to call L&D unit. 2016  Spoke with Dr. Vita Parikh. MD made aware of pt's 24 hour urine result of 552, and that pt continues to deny symptoms of pre-e, including HA, vision changes and epigastric pain. MD also made aware that pt's evening BP was 127/68, and that evening NST was reactive. TORB from MD to inform pt that she will be staying in the hospital at least until Monday. No additional orders at this time. 2046  Pt informed that her 24 hour urine result was 552. Pt informed that Dr. Vita Parikh said that she will stay in the hospital until at least Monday. Pt and pt's  verbalize understanding. Pt and pt's  deny any new needs/complaints at this time. Call bell within reach of pt.  2216  Bedtime insulin and ambien given to pt. Pt awake laying in bed at this time. Pt's  asleep on bedside cot. No visible signs of distress noted in  at this time. Pt educated on signs and symptoms of hypoglycemia and pre-eclampsia. Pt educated to call RN if she feels symptomatic of hypoglycemia, or symptomatic of pre-eclampsia at any time during the night. Pt verbalizes understanding. Pt also educated on purposeful rounding. Pt verbalizes understanding at this time. Call bell within reach of pt.  0706  Bedside and Verbal shift change report given to Osvaldo Abernathy RN (oncoming nurse) by Moises Bose RN (offgoing nurse). Report included the following information SBAR, Kardex, Intake/Output, MAR, Recent Results and Med Rec Status.

## 2017-04-10 VITALS
RESPIRATION RATE: 17 BRPM | HEART RATE: 69 BPM | WEIGHT: 270.6 LBS | DIASTOLIC BLOOD PRESSURE: 63 MMHG | SYSTOLIC BLOOD PRESSURE: 109 MMHG | OXYGEN SATURATION: 98 % | HEIGHT: 66 IN | TEMPERATURE: 98.1 F | BODY MASS INDEX: 43.49 KG/M2

## 2017-04-10 LAB
ALBUMIN SERPL BCP-MCNC: 2.3 G/DL (ref 3.5–5)
ALBUMIN/GLOB SERPL: 0.7 {RATIO} (ref 1.1–2.2)
ALP SERPL-CCNC: 133 U/L (ref 45–117)
ALT SERPL-CCNC: 10 U/L (ref 12–78)
ANION GAP BLD CALC-SCNC: 11 MMOL/L (ref 5–15)
AST SERPL W P-5'-P-CCNC: 9 U/L (ref 15–37)
BILIRUB SERPL-MCNC: 0.1 MG/DL (ref 0.2–1)
BUN SERPL-MCNC: 11 MG/DL (ref 6–20)
BUN/CREAT SERPL: 20 (ref 12–20)
CALCIUM SERPL-MCNC: 8.2 MG/DL (ref 8.5–10.1)
CHLORIDE SERPL-SCNC: 105 MMOL/L (ref 97–108)
CO2 SERPL-SCNC: 23 MMOL/L (ref 21–32)
CREAT SERPL-MCNC: 0.55 MG/DL (ref 0.55–1.02)
ERYTHROCYTE [DISTWIDTH] IN BLOOD BY AUTOMATED COUNT: 15.1 % (ref 11.5–14.5)
GLOBULIN SER CALC-MCNC: 3.5 G/DL (ref 2–4)
GLUCOSE BLD STRIP.AUTO-MCNC: 107 MG/DL (ref 65–100)
GLUCOSE BLD STRIP.AUTO-MCNC: 112 MG/DL (ref 65–100)
GLUCOSE SERPL-MCNC: 107 MG/DL (ref 65–100)
HCT VFR BLD AUTO: 33.8 % (ref 35–47)
HGB BLD-MCNC: 10.6 G/DL (ref 11.5–16)
LDH SERPL L TO P-CCNC: 126 U/L (ref 81–246)
MCH RBC QN AUTO: 26.2 PG (ref 26–34)
MCHC RBC AUTO-ENTMCNC: 31.4 G/DL (ref 30–36.5)
MCV RBC AUTO: 83.7 FL (ref 80–99)
PLATELET # BLD AUTO: 205 K/UL (ref 150–400)
POTASSIUM SERPL-SCNC: 3.8 MMOL/L (ref 3.5–5.1)
PROT SERPL-MCNC: 5.8 G/DL (ref 6.4–8.2)
RBC # BLD AUTO: 4.04 M/UL (ref 3.8–5.2)
SERVICE CMNT-IMP: ABNORMAL
SERVICE CMNT-IMP: ABNORMAL
SODIUM SERPL-SCNC: 139 MMOL/L (ref 136–145)
URATE SERPL-MCNC: 5.1 MG/DL (ref 2.6–6)
WBC # BLD AUTO: 11.1 K/UL (ref 3.6–11)

## 2017-04-10 PROCEDURE — 76817 TRANSVAGINAL US OBSTETRIC: CPT | Performed by: OBSTETRICS & GYNECOLOGY

## 2017-04-10 PROCEDURE — 83615 LACTATE (LD) (LDH) ENZYME: CPT | Performed by: STUDENT IN AN ORGANIZED HEALTH CARE EDUCATION/TRAINING PROGRAM

## 2017-04-10 PROCEDURE — 76819 FETAL BIOPHYS PROFIL W/O NST: CPT | Performed by: OBSTETRICS & GYNECOLOGY

## 2017-04-10 PROCEDURE — 96360 HYDRATION IV INFUSION INIT: CPT | Performed by: OBSTETRICS & GYNECOLOGY

## 2017-04-10 PROCEDURE — 76805 OB US >/= 14 WKS SNGL FETUS: CPT | Performed by: OBSTETRICS & GYNECOLOGY

## 2017-04-10 PROCEDURE — 85027 COMPLETE CBC AUTOMATED: CPT | Performed by: STUDENT IN AN ORGANIZED HEALTH CARE EDUCATION/TRAINING PROGRAM

## 2017-04-10 PROCEDURE — 99218 HC RM OBSERVATION: CPT

## 2017-04-10 PROCEDURE — 80053 COMPREHEN METABOLIC PANEL: CPT | Performed by: STUDENT IN AN ORGANIZED HEALTH CARE EDUCATION/TRAINING PROGRAM

## 2017-04-10 PROCEDURE — 82962 GLUCOSE BLOOD TEST: CPT

## 2017-04-10 PROCEDURE — 99285 EMERGENCY DEPT VISIT HI MDM: CPT | Performed by: OBSTETRICS & GYNECOLOGY

## 2017-04-10 PROCEDURE — 96372 THER/PROPH/DIAG INJ SC/IM: CPT | Performed by: OBSTETRICS & GYNECOLOGY

## 2017-04-10 PROCEDURE — 36415 COLL VENOUS BLD VENIPUNCTURE: CPT | Performed by: STUDENT IN AN ORGANIZED HEALTH CARE EDUCATION/TRAINING PROGRAM

## 2017-04-10 PROCEDURE — 59025 FETAL NON-STRESS TEST: CPT | Performed by: OBSTETRICS & GYNECOLOGY

## 2017-04-10 PROCEDURE — 84550 ASSAY OF BLOOD/URIC ACID: CPT | Performed by: STUDENT IN AN ORGANIZED HEALTH CARE EDUCATION/TRAINING PROGRAM

## 2017-04-10 NOTE — DISCHARGE INSTRUCTIONS
Home Blood Glucose Test: About This Test  What is it? A home blood glucose test measures the amount of a type of sugar, called glucose, in your blood. Why is this test done? People who have diabetes need to check the amount of glucose in their blood. A home blood glucose test is an easy way to test your blood at home or when you are away from home. The results help you know when to take action to keep your blood glucose levels in a target range. How can you prepare for the test?  · Check the expiration date on the bottle of testing strips. Do not use test strips that have . · Match the code number on the testing strips bottle with the number on the meter. If the numbers do not match, follow the directions with the meter for changing the code number. What happens before the test?  The supplies you will need for testing blood glucose include:  · A blood glucose meter. · Testing strips. These are made to be used with a specific model of meter. · Sugar control solutions. Some meters require a specific solution. Many new meters are made to operate without a control solution. · Short needles called lancets for pricking your skin. · A pen-sized rcuz for the lancet (lancet device), which positions the lancet and controls how deeply it goes into your skin. · Clean cotton balls. These are used to stop the bleeding from the testing site. What happens during the test?  A home blood glucose test involves pricking your finger, palm, or forearm with a lancet to collect a drop of blood. The blood drop is placed on a test strip, which you insert into the blood glucose meter. The instructions for testing are slightly different for each blood glucose meter model. Follow the instructions that came with your meter. · Wash your hands with warm, soapy water. Dry them well with a clean towel.  You may also use an alcohol wipe to clean your finger or other site, but make sure your hands are dry before the test.  · Insert a clean lancet into the lancet device. · Remove a test strip from the test strip bottle. Replace the lid immediately to keep moisture away from the other strips. · Follow the instructions that came with your meter to get it ready. · Use the lancet device to stick the side of your fingertip with the lancet. Do not stick the tip of your finger. Some blood sugar meters use lancet devices that take the blood sample from other sites, such as the palm of the hand or the forearm. But the finger is usually the most accurate place to test blood sugar. · Put a drop of blood on the correct spot on the test strip. · Apply pressure with a clean cotton ball to stop the bleeding. · Follow the directions that came with the meter to get the results. · Write down the results and the time that you tested your blood. Some meters will store the results for you. What else should you know about the test?  The American Diabetes Association (ADA) recommends that you stay within the following blood glucose level ranges. But depending on your health, you and your doctor may set a different range for you. For nonpregnant adults with diabetes:  · 80 milligrams per deciliter (mg/dL) to 130 mg/dL before a meal  · Less than 180 mg/dL 1 to 2 hours after a meal  For women who have diabetes related to pregnancy (gestational diabetes):  · 95 mg/dL or less before breakfast  · 120 to 140 mg/dL (or lower) 1 to 2 hours after a meal  How long does the test take? · The blood glucose meter will show the results of the test in a minute or less. Where can you learn more? Go to http://adrianne-soheila.info/. Enter V665 in the search box to learn more about \"Home Blood Glucose Test: About This Test.\"  Current as of: May 23, 2016  Content Version: 11.2  © 1652-2895 [employer]. Care instructions adapted under license by [employer] (which disclaims liability or warranty for this information).  If you have questions about a medical condition or this instruction, always ask your healthcare professional. [place] [address] any warranty or liability for your use of this information. Learning About When to Call Your Doctor During Pregnancy (After 20 Weeks)  Your Care Instructions  It's common to have concerns about what might be a problem during pregnancy. Although most pregnant women don't have any serious problems, it's important to know when to call your doctor if you have certain symptoms or signs of labor. These are general suggestions. Your doctor may give you some more information about when to call. When to call your doctor (after 20 weeks)  Call 911 anytime you think you may need emergency care. For example, call if:  · You have severe vaginal bleeding. · You have sudden, severe pain in your belly. · You passed out (lost consciousness). · You have a seizure. · You see or feel the umbilical cord. · You think you are about to deliver your baby and can't make it safely to the hospital.  Call your doctor now or seek immediate medical care if:  · You have vaginal bleeding. · You have belly pain. · You have a fever. · You have symptoms of preeclampsia, such as:  ¨ Sudden swelling of your face, hands, or feet. ¨ New vision problems (such as dimness or blurring). ¨ A severe headache. · You have a sudden release of fluid from your vagina. (You think your water broke.)  · You think that you may be in labor. This means that you've had at least 4 contractions within 20 minutes or at least 8 contractions in an hour. · You notice that your baby has stopped moving or is moving much less than normal.  · You have symptoms of a urinary tract infection. These may include:  ¨ Pain or burning when you urinate. ¨ A frequent need to urinate without being able to pass much urine. ¨ Pain in the flank, which is just below the rib cage and above the waist on either side of the back.   ¨ Blood in your urine. Watch closely for changes in your health, and be sure to contact your doctor if:  · You have vaginal discharge that smells bad. · You have skin changes, such as:  ¨ A rash. ¨ Itching. ¨ Yellow color to your skin. · You have other concerns about your pregnancy. If you have labor signs at 37 weeks or more  If you have signs of labor at 37 weeks or more, your doctor may tell you to call when your labor becomes more active. Symptoms of active labor include:  · Contractions that are regular. · Contractions that are less than 5 minutes apart. · Contractions that are hard to talk through. Follow-up care is a key part of your treatment and safety. Be sure to make and go to all appointments, and call your doctor if you are having problems. It's also a good idea to know your test results and keep a list of the medicines you take. Where can you learn more? Go to http://adrianne-soheila.info/. Enter  in the search box to learn more about \"Learning About When to Call Your Doctor During Pregnancy (After 20 Weeks). \"  Current as of: May 30, 2016  Content Version: 11.2  © 4705-1975 [employer]. Care instructions adapted under license by [employer] (which disclaims liability or warranty for this information). If you have questions about a medical condition or this instruction, always ask your healthcare professional. [place] [address] any warranty or liability for your use of this information. Preeclampsia: Care Instructions  Your Care Instructions  Preeclampsia occurs when a woman's blood pressure rises during pregnancy. Often with preeclampsia, you also have swelling in your legs, hands, and face. A test may show too much protein in your urine. Preeclampsia is also called toxemia. If preeclampsia is severe and not treated, it can lead to seizures (eclampsia) and damage to your liver or kidneys.   Preeclampsia can prevent your baby from getting enough food and oxygen. This can cause a low birth weight or other problems. Your doctor will watch you closely to prevent these problems. He or she also may recommend that you rest in bed most of the day. If your preeclampsia is a danger to your health or the health of your baby, your doctor may need to deliver your baby early. While preeclampsia is a concern, most women with preeclampsia have healthy babies. After a woman gives birth, preeclampsia usually goes away on its own. Follow-up care is a key part of your treatment and safety. Be sure to make and go to all appointments, and call your doctor if you are having problems. It's also a good idea to know your test results and keep a list of the medicines you take. How can you care for yourself at home? · Take and record your blood pressure at home if your doctor tells you to. ¨ Learn the importance of the two measures of blood pressure (such as 120 over 80, or 120/80). The first number is the systolic pressure, which is the force of blood on the artery walls as the heart pumps. The second number is the diastolic pressure, which is the force of blood on the artery walls between heartbeats, when the heart is at rest. You have a choice of monitors to use. ¨ Manual monitor: You pump up the cuff and use a stethoscope to listen for your pulse. ¨ Electronic monitor: The cuff inflates, and a gauge shows your pulse rate. ¨ To take your blood pressure:  ¨ Ask your doctor to check your blood pressure monitor to be sure that it is accurate and that the cuff fits you. Also ask your doctor to watch you to make sure that you are using it right. ¨ You should not eat, use tobacco products, or use medicine known to raise blood pressure (such as some nasal decongestant sprays) before you take your blood pressure. ¨ Avoid taking your blood pressure if you have just exercised or are nervous or upset. Rest at least 15 minutes before you take your blood pressure.   · If your doctor advises, check the protein levels in your urine. Your doctor or nurse will show you how to do this. · Take your medicines exactly as prescribed. Call your doctor if you think you are having a problem with your medicine. · Do not smoke. Quitting smoking will help lower your blood pressure and improve your baby's growth and health. If you need help quitting, talk to your doctor about stop-smoking programs and medicines. These can increase your chances of quitting for good. · Eat a balanced and healthy diet that has lots of fruits and vegetables. · If your doctor advised bed rest, be sure to stay off your feet and rest as much as possible. ¨ Keep a phone, phone book, notepad, and pen near the bed where you can easily reach them. ¨ Gently stretch your legs every hour to maintain good blood flow. ¨ Have another family member pack snacks and lunch food in a cooler close to your bed. ¨ Use this time for activities that you usually cannot find time for, such as reading, craft projects, or letter writing. · You can keep track of your baby's health by noting the length of time it takes to count 10 movements (such as kicks, flutters, or rolls). Feeling 10 movements in less than 1 hour is considered normal. Track your baby's movements once each day, and bring this record with you to each prenatal visit. When should you call for help? Call 911 anytime you think you may need emergency care. For example, call if:  · You have a seizure. · You passed out (lost consciousness). · You have severe vaginal bleeding. · You have severe pain in your belly or pelvis. · You have had fluid gushing or leaking from your vagina and you know or think the umbilical cord is bulging into your vagina. If this happens, immediately get down on your knees so your rear end (buttocks) is higher than your head. This will decrease the pressure on the cord until help arrives.   Call your doctor now or seek immediate medical care if:  · You have sudden swelling of your face, hands, or feet. · You have new vision problems (such as dimness or blurring). · You have a severe headache. · You have any vaginal bleeding. · You have belly pain or cramping. · You have a fever. · You have had regular contractions (with or without pain) for an hour. This means that you have 8 or more within 1 hour or 4 or more in 20 minutes after you change your position and drink fluids. · You have a sudden release of fluid from the vagina. · You have low back pain or pelvic pressure that does not go away. · You notice that your baby has stopped moving or is moving much less than normal.  Watch closely for changes in your health, and be sure to contact your doctor if you have any questions. Where can you learn more? Go to http://adrianne-soheila.info/. Enter D913 in the search box to learn more about \"Preeclampsia: Care Instructions. \"  Current as of: May 30, 2016  Content Version: 11.2  © 9943-6424 [employer]. Care instructions adapted under license by [employer] (which disclaims liability or warranty for this information). If you have questions about a medical condition or this instruction, always ask your healthcare professional. [address] any warranty or liability for your use of this information.

## 2017-04-10 NOTE — CONSULTS
Maternal-Fetal Medicine    Ms. Brenda Ghosh P1 at 36w 3d gestation, was admitted with increased blood pressures (153/92 mm Hg) at your office. She does not have severe headache or visual disturbances or right upper quadrant pain or vaginal bleeding. She reports good fetal movements. Patient also has gestational diabetes and takes insulin NPH 20 units at night. Her blood glucose levels were within normal range. Since admission, the patient received  corticosteroids. Obstetric history is significant for a term vaginal delivery in  of a female infant weighing 7-11 at birth. Patient reports no chronic medical conditions. P/E: Patient is comfortable; not in distress. Vitals stable. BP range (24-h) 109-149/63-94 mm Hg. Abd: Soft gravid uterus; no tenderness. Labs: Hb 10.6, Hct 33.8, , WBC 11.1, electrolytes normal, ALT 10, AST 9, , creatinine 0.55. Fasting glucose 107 mg/dL and postprandial levels in the 140s. 24-hour urine protein 552 mg (increased). On ultrasound, amniotic fluid is normal and good fetal activity is seen. Cephalic presentation. Fetal growth is appropriate for gestational age. Fetal anatomy appears normal, but limited by advanced gestational age.  testing is reassuring. NSTs performed in L&D have been reassuring. I counseled the patient on the following:  Preeclampsia without severe features: I explained the diagnosis based on increased blood pressures and proteinuria. I discussed maternal complications including eclampsia, pulmonary edema, end-organ damage, and placental abruption. As risk of potential maternal complications outweigh  benefits, we recommend delivery at 37 weeks. Patient informed that she lives 8 minutes from hospital and would like to go home. She was educated on the severe features of preeclampsia. She will be discussing with you about discharge.     Gestational diabetes: I encouraged her to check her blood glucose regularly and adjust bedtime insulin as needed. I also informed her that  corticosteroids induce hyperglycemia. There was no evidence of fetal macrosomia on ultrasound. Type 2 diabetes: Women with gestational diabetes are prone to develop type 2 diabetes (25% to 60%) after delivery. I recommended that she should be screened at 6 to 12 weeks after delivery with 75 g glucose load (fasting and 2-hour). Recommendations:  -Delivery at 37 weeks. Discussed with Dr. Marcela Nazario.

## 2017-04-10 NOTE — PROGRESS NOTES
[facility] Progress Note    Aida Dominguez  [id number]    Patient admitted for preeclampsia 36w3d Estimated Date of Delivery: 17 states she does not  have  headache , abdominal pain  , contractions and vaginal bleeding . Vitals:  Patient Vitals for the past 24 hrs:   BP Temp Pulse Resp SpO2   04/10/17 1020 109/63 - 69 - -   04/10/17 0932 148/82 - 75 - -   04/10/17 0905 (!) 149/94 98.1 °F (36.7 °C) 69 17 98 %   04/10/17 0548 125/74 - 61 - -   17 2207 129/63 - 72 - -   17 1950 130/67 98.2 °F (36.8 °C) 72 16 -   17 1743 136/72 98.5 °F (36.9 °C) 75 - -     Temp (24hrs), Av.3 °F (36.8 °C), Min:98.1 °F (36.7 °C), Max:98.5 °F (36.9 °C)    I&O:                    NST:  Reactive  Uterine Activity: None    Exam:  Patient without distress.                Abdomen soft, non-tender               Fundus soft and non tender               Lower extremities edema 1+                                           Labs:   Recent Results (from the past 24 hour(s))   GLUCOSE, POC    Collection Time: 17  2:53 PM   Result Value Ref Range    Glucose (POC) 149 (H) 65 - 100 mg/dL    Performed by Fely Patel    GLUCOSE, POC    Collection Time: 17  8:54 PM   Result Value Ref Range    Glucose (POC) 149 (H) 65 - 100 mg/dL    Performed by Bertha Strong    GLUCOSE, POC    Collection Time: 04/10/17  5:42 AM   Result Value Ref Range    Glucose (POC) 107 (H) 65 - 100 mg/dL    Performed by TINY Fuchs    CBC W/O DIFF    Collection Time: 04/10/17  5:44 AM   Result Value Ref Range    WBC 11.1 (H) 3.6 - 11.0 K/uL    RBC 4.04 3.80 - 5.20 M/uL    HGB 10.6 (L) 11.5 - 16.0 g/dL    HCT 33.8 (L) 35.0 - 47.0 %    MCV 83.7 80.0 - 99.0 FL    MCH 26.2 26.0 - 34.0 PG    MCHC 31.4 30.0 - 36.5 g/dL    RDW 15.1 (H) 11.5 - 14.5 %    PLATELET 161 274 - 007 K/uL   METABOLIC PANEL, COMPREHENSIVE    Collection Time: 04/10/17  5:44 AM   Result Value Ref Range    Sodium 139 136 - 145 mmol/L    Potassium 3.8 3.5 - 5.1 mmol/L Chloride 105 97 - 108 mmol/L    CO2 23 21 - 32 mmol/L    Anion gap 11 5 - 15 mmol/L    Glucose 107 (H) 65 - 100 mg/dL    BUN 11 6 - 20 MG/DL    Creatinine 0.55 0.55 - 1.02 MG/DL    BUN/Creatinine ratio 20 12 - 20      GFR est AA >60 >60 ml/min/1.73m2    GFR est non-AA >60 >60 ml/min/1.73m2    Calcium 8.2 (L) 8.5 - 10.1 MG/DL    Bilirubin, total 0.1 (L) 0.2 - 1.0 MG/DL    ALT (SGPT) 10 (L) 12 - 78 U/L    AST (SGOT) 9 (L) 15 - 37 U/L    Alk. phosphatase 133 (H) 45 - 117 U/L    Protein, total 5.8 (L) 6.4 - 8.2 g/dL    Albumin 2.3 (L) 3.5 - 5.0 g/dL    Globulin 3.5 2.0 - 4.0 g/dL    A-G Ratio 0.7 (L) 1.1 - 2.2     LD    Collection Time: 04/10/17  5:44 AM   Result Value Ref Range     81 - 246 U/L   URIC ACID    Collection Time: 04/10/17  5:44 AM   Result Value Ref Range    Uric acid 5.1 2.6 - 6.0 MG/DL   GLUCOSE, POC    Collection Time: 04/10/17 10:18 AM   Result Value Ref Range    Glucose (POC) 112 (H) 65 - 100 mg/dL    Performed by Porsha Forrest and Plan:   IUP @ 36w3d   Patient Active Problem List    Diagnosis Date Noted    Pregnancy 04/08/2017     Mild preeclampsia. No severe features. Has induction set up later this week. D/w MFM DR. Elie Mckeon. Agrees with plan. Discussed warnings.

## 2017-04-10 NOTE — ROUTINE PROCESS
0715  Bedside and Verbal shift change report given to Floyd Duffy RN (oncoming nurse) by John Trivedi RN (offgoing nurse). Report included the following information SBAR, Kardex, Intake/Output, MAR, Recent Results and Med Rec Status.

## 2017-04-10 NOTE — PROGRESS NOTES
[facility] - SBAR report from CRISTOBAL Barahona RN. Assumed care. 0421 - Rounding for AM assessment. Pt in BR, will return. 5741 -  Pt anxious to go home. Discussed POC with patient and confirmed with MD  - to include [facility] visit today, continued BPs, DM management and NSTs TID. Pt verbalizes understanding. 0805 - Pt to breakfast via wheelchair. 0905 - Resting /94  0932 - Repeat /82  0930-NST complete and reactive. Pt verbally noted that BS checks were changed to 2 hours after meals and before bedtime. 1020 - , /63  1200 - Pt back from [facility]. Dr. Carlita Moctezuma clears patient for discharge pending discussion with Dr. Agus Skelton. Dr Agus Skelton at bedside discussing discharge instructions to include modified bedrest.  Pt to be discharged home. 1215 - Discharge instructions discussed. Including s/s of increased BP, when to call OB, and confirmed that patient will keep current induction for Friday 4/14. Modified bedrest until delivery. Pt and spouse verbalize understanding. 12 - Pt discharged via wheelchair with FOB and all belongings. Will be returning for induction Thursday evening.

## 2017-04-12 NOTE — DISCHARGE SUMMARY
Patient ID:  Mara Storey  [id number]  60 y.o.  1985    Admit Date: 2017    Discharge Date: 2017     Admitting Physician: Unknown Ask, DO    Attending Physician: No att. providers found    Admission Diagnoses: Possible pre eclampsia  Pregnancy    Procedures for this admission:     Discharge Diagnosis  same  This patient has no babies on file. Discharge Disposition:  home    Discharge Condition:  stable    Additional Diagnoses: preeclampsia. Maternal Labs:   Lab Results   Component Value Date/Time    HBsAg, External Negative 2016    HIV, External Negative 2016    Rubella, External Immune 2016    RPR, External Non-Reactive 2016       Cord Blood Results: This patient has no babies on file. History of Present Illness:   OB History      Para Term  AB TAB SAB Ectopic Multiple Living    2 1 1               Admitted for mild preeclampsia. Hospital Course  Please the chart for details. The postpartum course was unremarkable. She was deemed stable for discharge home.     Follow-up Care: 2 days        Signed:  Tremaine Crorea MD  2017  12:45 PM

## 2017-04-13 ENCOUNTER — HOSPITAL ENCOUNTER (INPATIENT)
Age: 32
LOS: 4 days | Discharge: HOME | DRG: 540 | End: 2017-04-17
Attending: OBSTETRICS & GYNECOLOGY | Admitting: OBSTETRICS & GYNECOLOGY
Payer: MEDICAID

## 2017-04-13 PROBLEM — Z34.90 PREGNANT: Status: ACTIVE | Noted: 2017-04-13

## 2017-04-13 LAB
ABO + RH BLD: NORMAL
ALBUMIN SERPL BCP-MCNC: 2.6 G/DL (ref 3.5–5)
ALBUMIN/GLOB SERPL: 0.7 {RATIO} (ref 1.1–2.2)
ALP SERPL-CCNC: 152 U/L (ref 45–117)
ALT SERPL-CCNC: 15 U/L (ref 12–78)
ANION GAP BLD CALC-SCNC: 11 MMOL/L (ref 5–15)
AST SERPL W P-5'-P-CCNC: 13 U/L (ref 15–37)
BASOPHILS # BLD AUTO: 0 K/UL (ref 0–0.1)
BASOPHILS # BLD: 0 % (ref 0–1)
BILIRUB SERPL-MCNC: 0.2 MG/DL (ref 0.2–1)
BLOOD GROUP ANTIBODIES SERPL: NORMAL
BUN SERPL-MCNC: 5 MG/DL (ref 6–20)
BUN/CREAT SERPL: 8 (ref 12–20)
CALCIUM SERPL-MCNC: 8.1 MG/DL (ref 8.5–10.1)
CHLORIDE SERPL-SCNC: 103 MMOL/L (ref 97–108)
CO2 SERPL-SCNC: 24 MMOL/L (ref 21–32)
CREAT SERPL-MCNC: 0.62 MG/DL (ref 0.55–1.02)
EOSINOPHIL # BLD: 0.1 K/UL (ref 0–0.4)
EOSINOPHIL NFR BLD: 0 % (ref 0–7)
ERYTHROCYTE [DISTWIDTH] IN BLOOD BY AUTOMATED COUNT: 14.7 % (ref 11.5–14.5)
GLOBULIN SER CALC-MCNC: 3.6 G/DL (ref 2–4)
GLUCOSE BLD STRIP.AUTO-MCNC: 147 MG/DL (ref 65–100)
GLUCOSE SERPL-MCNC: 151 MG/DL (ref 65–100)
HCT VFR BLD AUTO: 33.6 % (ref 35–47)
HGB BLD-MCNC: 11.4 G/DL (ref 11.5–16)
LYMPHOCYTES # BLD AUTO: 19 % (ref 12–49)
LYMPHOCYTES # BLD: 2.5 K/UL (ref 0.8–3.5)
MCH RBC QN AUTO: 27.4 PG (ref 26–34)
MCHC RBC AUTO-ENTMCNC: 33.9 G/DL (ref 30–36.5)
MCV RBC AUTO: 80.8 FL (ref 80–99)
MONOCYTES # BLD: 0.6 K/UL (ref 0–1)
MONOCYTES NFR BLD AUTO: 5 % (ref 5–13)
NEUTS SEG # BLD: 10 K/UL (ref 1.8–8)
NEUTS SEG NFR BLD AUTO: 76 % (ref 32–75)
PLATELET # BLD AUTO: 221 K/UL (ref 150–400)
POTASSIUM SERPL-SCNC: 4 MMOL/L (ref 3.5–5.1)
PROT SERPL-MCNC: 6.2 G/DL (ref 6.4–8.2)
RBC # BLD AUTO: 4.16 M/UL (ref 3.8–5.2)
SERVICE CMNT-IMP: ABNORMAL
SODIUM SERPL-SCNC: 138 MMOL/L (ref 136–145)
SPECIMEN EXP DATE BLD: NORMAL
WBC # BLD AUTO: 13.1 K/UL (ref 3.6–11)

## 2017-04-13 PROCEDURE — 86850 RBC ANTIBODY SCREEN: CPT | Performed by: STUDENT IN AN ORGANIZED HEALTH CARE EDUCATION/TRAINING PROGRAM

## 2017-04-13 PROCEDURE — 59200 INSERT CERVICAL DILATOR: CPT | Performed by: OBSTETRICS & GYNECOLOGY

## 2017-04-13 PROCEDURE — 65270000029 HC RM PRIVATE

## 2017-04-13 PROCEDURE — 74011250637 HC RX REV CODE- 250/637: Performed by: STUDENT IN AN ORGANIZED HEALTH CARE EDUCATION/TRAINING PROGRAM

## 2017-04-13 PROCEDURE — 80053 COMPREHEN METABOLIC PANEL: CPT | Performed by: STUDENT IN AN ORGANIZED HEALTH CARE EDUCATION/TRAINING PROGRAM

## 2017-04-13 PROCEDURE — 36415 COLL VENOUS BLD VENIPUNCTURE: CPT | Performed by: STUDENT IN AN ORGANIZED HEALTH CARE EDUCATION/TRAINING PROGRAM

## 2017-04-13 PROCEDURE — 85025 COMPLETE CBC W/AUTO DIFF WBC: CPT | Performed by: STUDENT IN AN ORGANIZED HEALTH CARE EDUCATION/TRAINING PROGRAM

## 2017-04-13 PROCEDURE — 74011636637 HC RX REV CODE- 636/637: Performed by: STUDENT IN AN ORGANIZED HEALTH CARE EDUCATION/TRAINING PROGRAM

## 2017-04-13 PROCEDURE — 82962 GLUCOSE BLOOD TEST: CPT

## 2017-04-13 RX ORDER — ONDANSETRON 2 MG/ML
4 INJECTION INTRAMUSCULAR; INTRAVENOUS
Status: DISCONTINUED | OUTPATIENT
Start: 2017-04-13 | End: 2017-04-17 | Stop reason: HOSPADM

## 2017-04-13 RX ORDER — ACETAMINOPHEN 325 MG/1
650 TABLET ORAL
Status: DISCONTINUED | OUTPATIENT
Start: 2017-04-13 | End: 2017-04-17 | Stop reason: HOSPADM

## 2017-04-13 RX ORDER — ZOLPIDEM TARTRATE 5 MG/1
5 TABLET ORAL
Status: DISCONTINUED | OUTPATIENT
Start: 2017-04-13 | End: 2017-04-14 | Stop reason: SDUPTHER

## 2017-04-13 RX ORDER — BUTORPHANOL TARTRATE 1 MG/ML
1 INJECTION INTRAMUSCULAR; INTRAVENOUS
Status: DISCONTINUED | OUTPATIENT
Start: 2017-04-13 | End: 2017-04-17 | Stop reason: HOSPADM

## 2017-04-13 RX ADMIN — HUMAN INSULIN 20 UNITS: 100 INJECTION, SUSPENSION SUBCUTANEOUS at 22:03

## 2017-04-13 RX ADMIN — ZOLPIDEM TARTRATE 5 MG: 5 TABLET, FILM COATED ORAL at 22:03

## 2017-04-13 NOTE — PROGRESS NOTES
1840:  Raymond bulb was placed at this time by Brenda Juarez MD, inflated with 60 cc of normal saline. Per Brenda Juarez MD she would like for the pt to take her normal night time dose of insulin, have blood sugar 2 hours after meals, a fasting blood sugar in the morning, and every 4 hours while in labor. Beny Busch no other orders received at this time.       1900:  Reported off to [address]

## 2017-04-13 NOTE — H&P
History & Physical    Name: Cony Luis MRN: [id number]  SSN: [id number]    YOB: 1985  Age: 33 y.o. Sex: female      Subjective:     Estimated Date of Delivery: 17  OB History      Para Term  AB TAB SAB Ectopic Multiple Living    2 1 1                 Ms. Brooklyn Bateman is admitted with pregnancy at 36w6d for induction of labor due to gestational diabetes and Pre-E without severe features. Prenatal course was complicated by GDM on insulin -noncompliant, fair control, obesity, Pre-E without severe features diagnosed at 36wks. Please see prenatal records for details. Past Medical History:   Diagnosis Date    Asthma     Diabetes ([facility] [address]     gestational diabetes    Essential hypertension     seen today for elevated blood pressure    Staph aureus infection 2006    over abdomen     Past Surgical History:   Procedure Laterality Date    BREAST SURGERY PROCEDURE UNLISTED      HX BREAST REDUCTION      HX CHOLECYSTECTOMY      HX TONSILLECTOMY       Social History     Occupational History    Not on file. Social History Main Topics    Smoking status: Never Smoker    Smokeless tobacco: Not on file    Alcohol use No    Drug use: No    Sexual activity: Yes     Partners: Male     No family history on file. Allergies   Allergen Reactions    Chlorzoxazone Hives    Keflex [Cephalexin] Hives    Penicillins Hives     Prior to Admission medications    Medication Sig Start Date End Date Taking? Authorizing Provider   pnv w/o calcium-iron fum-fa 27-1 mg tab Take  by mouth. Yes Historical Provider   insulin NPH (HUMULIN N PEN) 100 unit/mL (3 mL) inpn 20 Units by SubCUTAneous route nightly. Indications: Gestational Diabetes Mellitus   Yes Historical Provider   albuterol (PROVENTIL HFA, VENTOLIN HFA) 90 mcg/actuation inhaler Take  by inhalation.     Ba Arzate MD        Review of Systems: A comprehensive review of systems was negative except for that written in the History of Present Illness. Objective:     Vitals:  Vitals:    04/13/17 1746   Weight: 122.5 kg (270 lb)   Height: 5' 6\" (1.676 m)        Physical Exam:  Patient without distress. Heart: Regular rate and rhythm  Lung: normal respiratory effort  Abdomen: soft, nontender  Fundus: soft and non tender  Perineum: blood absent, amniotic fluid absent  Cervical Exam: 1 cm dilated    20% effaced    -3 station    Presenting Part: cephalic  Lower Extremities:  - Edema No  Membranes:  Intact  Fetal Heart Rate: Reactive  Variability: moderate  Accelerations: yes  Decelerations: none  Uterine contractions: none          Prenatal Labs:   Lab Results   Component Value Date/Time    Rubella, External Immune 09/16/2016    HBsAg, External Negative 09/16/2016    HIV, External Negative 09/16/2016    RPR, External Non-Reactive 09/16/2016    Gonorrhea, External Negative 09/16/2016    Chlamydia, External Negative 09/16/2016      Recent Results (from the past 12 hour(s))   CBC WITH AUTOMATED DIFF    Collection Time: 04/13/17  5:59 PM   Result Value Ref Range    WBC 13.1 (H) 3.6 - 11.0 K/uL    RBC 4.16 3.80 - 5.20 M/uL    HGB 11.4 (L) 11.5 - 16.0 g/dL    HCT 33.6 (L) 35.0 - 47.0 %    MCV 80.8 80.0 - 99.0 FL    MCH 27.4 26.0 - 34.0 PG    MCHC 33.9 30.0 - 36.5 g/dL    RDW 14.7 (H) 11.5 - 14.5 %    PLATELET 692 727 - 494 K/uL    NEUTROPHILS 76 (H) 32 - 75 %    LYMPHOCYTES 19 12 - 49 %    MONOCYTES 5 5 - 13 %    EOSINOPHILS 0 0 - 7 %    BASOPHILS 0 0 - 1 %    ABS. NEUTROPHILS 10.0 (H) 1.8 - 8.0 K/UL    ABS. LYMPHOCYTES 2.5 0.8 - 3.5 K/UL    ABS. MONOCYTES 0.6 0.0 - 1.0 K/UL    ABS. EOSINOPHILS 0.1 0.0 - 0.4 K/UL    ABS.  BASOPHILS 0.0 0.0 - 0.1 K/UL   TYPE & SCREEN    Collection Time: 04/13/17  5:59 PM   Result Value Ref Range    Crossmatch Expiration 04/16/2017     ABO/Rh(D) A POSITIVE     Antibody screen NEG    METABOLIC PANEL, COMPREHENSIVE    Collection Time: 04/13/17  5:59 PM   Result Value Ref Range    Sodium 138 136 - 145 mmol/L    Potassium 4.0 3.5 - 5.1 mmol/L    Chloride 103 97 - 108 mmol/L    CO2 24 21 - 32 mmol/L    Anion gap 11 5 - 15 mmol/L    Glucose 151 (H) 65 - 100 mg/dL    BUN 5 (L) 6 - 20 MG/DL    Creatinine 0.62 0.55 - 1.02 MG/DL    BUN/Creatinine ratio 8 (L) 12 - 20      GFR est AA >60 >60 ml/min/1.73m2    GFR est non-AA >60 >60 ml/min/1.73m2    Calcium 8.1 (L) 8.5 - 10.1 MG/DL    Bilirubin, total 0.2 0.2 - 1.0 MG/DL    ALT (SGPT) 15 12 - 78 U/L    AST (SGOT) 13 (L) 15 - 37 U/L    Alk. phosphatase 152 (H) 45 - 117 U/L    Protein, total 6.2 (L) 6.4 - 8.2 g/dL    Albumin 2.6 (L) 3.5 - 5.0 g/dL    Globulin 3.6 2.0 - 4.0 g/dL    A-G Ratio 0.7 (L) 1.1 - 2.2     GLUCOSE, POC    Collection Time: 17  7:08 PM   Result Value Ref Range    Glucose (POC) 147 (H) 65 - 100 mg/dL    Performed by Kaelyn Lala        Impression/Plan:   32yo  with IUP @ 36w6d -ruled in for Pre-E without severe features based on mild range BPs and Pr:Cr ratio of 0.4 -here for cervical ripening and IOL tomorrow at 37w0d     1. Preeclampsia without severe features: BP mild range  s/p 48 hour course of steroids to promote fetal lung maturity  PIH labs WNL tonight       2. GDM -on NPH 20u at bedtime, 2hr PP on arrival was 147, will administer NPH tonight, check BGLs Q4H in labor    3.  GBS -neg     Consents reviewed and signed, questions answered      Signed By:  Randall Young DO     2017

## 2017-04-13 NOTE — IP AVS SNAPSHOT
Current Discharge Medication List  
  
START taking these medications Dose & Instructions Dispensing Information Comments Morning Noon Evening Bedtime HYDROcodone-acetaminophen 5-325 mg per tablet Commonly known as:  Olegario Ferrera Your last dose was: Your next dose is:    
   
   
 Dose:  1 Tab Take 1 Tab by mouth every six (6) hours as needed. Max Daily Amount: 4 Tabs. Quantity:  30 Tab Refills:  0  
     
   
   
   
  
 ibuprofen 600 mg tablet Commonly known as:  MOTRIN Your last dose was: Your next dose is:    
   
   
 Dose:  600 mg Take 1 Tab by mouth every six (6) hours as needed. Quantity:  30 Tab Refills:  1 CONTINUE these medications which have NOT CHANGED Dose & Instructions Dispensing Information Comments Morning Noon Evening Bedtime  
 albuterol 90 mcg/actuation inhaler Commonly known as:  PROVENTIL HFA, VENTOLIN HFA, PROAIR HFA Your last dose was: Your next dose is: Take  by inhalation. Refills:  0 ASK your doctor about these medications Dose & Instructions Dispensing Information Comments Morning Noon Evening Bedtime HumuLIN N Pen 100 unit/mL (3 mL) Inpn Generic drug:  insulin NPH Your last dose was: Your next dose is:    
   
   
 Dose:  20 Units 20 Units by SubCUTAneous route nightly. Indications: Gestational Diabetes Mellitus Refills:  0  
     
   
   
   
  
 pnv w/o calcium-iron fum-fa 27-1 mg Tab Your last dose was: Your next dose is: Take  by mouth. Refills:  0 Where to Get Your Medications Information on where to get these meds will be given to you by the nurse or doctor. ! Ask your nurse or doctor about these medications HYDROcodone-acetaminophen 5-325 mg per tablet  
 ibuprofen 600 mg tablet

## 2017-04-13 NOTE — IP AVS SNAPSHOT
Summary of Care Report The Summary of Care report has been created to help improve care coordination. Users with access to "[employer] or [address] (Web-based application) may access additional patient information including the Discharge Summary. If you are not currently a [address] user and need more information, please call the number listed below in the [place] [address] section and ask to be connected with Medical Records. Facility Information Name Address Phone [address] [facility] [place] [address] [phone] Patient Information Patient Name Sex ANYI Chen ([id number]) Female 1985 Discharge Information Admitting Provider Service Area Unit Nick Vital MD / [phone] [facility] 3 Mother Infant / [phone] Discharge Provider Discharge Date/Time Discharge Disposition Destination (none) 2017 (Pending) AHR (none) Patient Language Language ENGLISH [13] Hospital Problems as of 2017  Never Reviewed Class Noted - Resolved Last Modified POA Active Problems Pregnant  2017 - Present 2017 by Nick Vital MD Unknown Entered by Nick Vital MD  
  
Non-Hospital Problems as of 2017  Never Reviewed Class Noted - Resolved Last Modified Active Problems Pregnancy  2017 - Present 2017 by Robert Sousa DO Entered by Robert Sousa DO You are allergic to the following Allergen Reactions Chlorzoxazone Hives Keflex (Cephalexin) Hives Penicillins Hives Current Discharge Medication List  
  
START taking these medications Dose & Instructions Dispensing Information Comments HYDROcodone-acetaminophen 5-325 mg per tablet Commonly known as:  Becky  Dose:  1 Tab Take 1 Tab by mouth every six (6) hours as needed. Max Daily Amount: 4 Tabs. Quantity:  30 Tab Refills:  0  
   
 ibuprofen 600 mg tablet Commonly known as:  MOTRIN Dose:  600 mg Take 1 Tab by mouth every six (6) hours as needed. Quantity:  30 Tab Refills:  1 CONTINUE these medications which have NOT CHANGED Dose & Instructions Dispensing Information Comments  
 albuterol 90 mcg/actuation inhaler Commonly known as:  PROVENTIL HFA, VENTOLIN HFA, PROAIR HFA Take  by inhalation. Refills:  0 ASK your doctor about these medications Dose & Instructions Dispensing Information Comments HumuLIN N Pen 100 unit/mL (3 mL) Inpn Generic drug:  insulin NPH Dose:  20 Units 20 Units by SubCUTAneous route nightly. Indications: Gestational Diabetes Mellitus Refills:  0  
   
 pnv w/o calcium-iron fum-fa 27-1 mg Tab Take  by mouth. Refills:  0 Current Immunizations Name Date Tdap 2017 Surgery Information ID Date/Time Status Primary Surgeon All Procedures Location [id number] 2017 Unposted   [facility] - DO NOT SCHEDULE    
 [id number] 2017 Posted Peggy Gonzales MD  SECTION [facility] L&D OR Follow-up Information Follow up With Details Comments Contact Info Ba Arzate MD   Patient can only remember the practice name and not the physician Discharge Instructions Discharge Instructions for  Section Patient ID: Bimal Page 
[id number] 
33 y.o. 
1985 Take Home Medications Continue taking your prenatal vitamins if you are breastfeeding. Follow-up Appointment: 
Follow-up with [facility] in 2 weeks. Follow-up care is a key part of your treatment and safety. Be sure to make and go to all appointments, and call your doctor if you are having problems. Its also a good idea to know your test results and keep a list of the medicines you take. Activity Avoid lifting more than 10 lbs for 6 weeks after your delivery. Don't put anything in your vagina for 6 weeks (no intercourse, tampons, or douching). Limit climbing stairs to twice a day, and please hold a railing. Have someone else carry your baby up stairs initially, as you may be a bit unsteady. No driving for about 2 weeks or until your are easily able to turn your body and move your foot from the gas to the brake pedal without pain- you need to be able to move quickly enough to respond to traffic. You cannot drive while you are taking narcotics (prescription pain medications). You may shower but do not take a bath for 2 weeks. Be sure to dry your incision well after your shower. You may gradually work up to light exercise such as brisk walking over the next few weeks, but take it easy- you'll be tired and sore! You need to wait 4-6 weeks before starting vigorous exercise such as aerobics, running, weight-lifting, sit-ups, etc.- clear this with your doctor at your postpartum check-up. Although it's important to limit certain activities and avoid \"over-doing it\", walking is good for you and will actually speed the healing process. Walking increases the blood flow to your legs, decreasing the risk of blood clots, and also encourages the bowels to speed up, decreasing constipation, bloating, and gassiness. Don't stay in bed at home, get up and move around the house. You'll feel better more quickly. Diet You may eat a regular diet but you may want to avoid heavy, greasy or spicy foods and other foods that could increase constipation and gas. Wound care Your incision may have been closed with conventional (metal) staples, absorbable (dissolving) staples, or absorbable sutures.   If you still have staples in your incision when you leave the hospital, call your doctor's office as instructed to schedule an appointment to have them removed (usually in about a week). Otherwise, there may be some small tapes over your incision called Steri-strips. Please remove these in about a week. There also may be Dermabond glue over your incision which looks like a clear, shiny coating (as if it were painted with clear fingernail polish). This will gradually peel off over the following weeks, do not remove it. It is normal to have a small amount of clear or reddish drainage from your incision. It's best to leave it open to the air, but if there is drainage, you may cover the incision lightly with gauze, preferably without tape. Keep the incision as dry as possible. You may even consider drying the incision with a cool hair dryer after you shower. Numbness of the skin at or around your incision is normal and the feeling usually returns gradually. Call your doctor if you have a lot of drainage from your incision, an unpleasant odor, red streaks, an increase in pain, or the incision appears to open up. Pain Management You were probably given a prescription for pain medication, similar to the one you've been taking while in the hospital.  In addition to this, you can take over-the-counter pain medicines like Advil or Motrin (ibuprofen). You can take both medications together, alternating doses every few hours. You will get the most relief if you take the maximum dose:  Motrin or Advil (generic ibuprofen) 800 mg every 8 hours (4 tablets or capsules every 8 hours). The prescription you were given probably contains a narcotic mixed with Tylenol, therefore, you should not take any extra Tylenol or acetominophen, or you could be getting more than the safe amount. If you feel you don't need the prescription pain medication, you may take Tylenol instead, along with ibuprofen. The maximum dose is Tylenol or acetominophen 1000 mg every 6 hours (equivalent to 2 Extra Strength Tylenols every 6 hours).   After a few days, you should reduce the amount of prescription pain medication you are taking. At that point, try to use ibuprofen as the main medication, and take the prescription medication if needed for more severe pain not relieved by the ibuprofen. Your goal should be to take only the minimum necessary amounts of the prescription medication (narcotic), as these pain medicines can be addictive and will worsen or cause constipation. Most patients will find that within a few days, their pain is adequately controlled using only over-the-counter medications and minimal doses of prescription pain medicine. A heating pad can also be very helpful for cramping or back pain. Sitz baths are helpful for pain associated with hemorrhoids. You can use either a sitz bath basin or a bathtub filled with 2-3\" inches of plain warm water. Soak for 10 minutes 3 times a day. Over-the-counter hemorrhoid wipes and ointments are fine to use as well. Constipation You may find that bowel movements are irregular after delivery and that you have a tendency to be constipated, especially after surgery. A stool softener such as Colace (docusate) can safely be taken daily if needed. If you become constipated you can use a laxative such as Dulcolax, Miralax or Milk of Magnesia. Don't wait until constipation is severe- take something sooner rather than later and you will feel much better! Suppositories such as Dulcolax or Fleet's Enemas are options too. Your Recovery: What to Expect at [facility] Delivering a baby is hard work and you probably aren't getting much sleep, so you will certainly be tired, especially after having a  section. Try to rest when you can and don't worry about doing housework or other tasks which can wait. The soreness in your incision will improve significantly over the first 2 weeks or so, but it may take 6-8 weeks before you are completely recovered.  
You are likely to have some back pain or general body aches or muscle soreness. This should improve with acetominophen or ibuprofen. If your legs were swollen during your pregnancy or as a result of IV fluids given during your hospital stay, this should go away in a few days to a week. Most women experience some form of the \"Baby Blues\" after having a baby. This means that you may feel emotional, tearful, frustrated, anxious, sad, and irritable some of the time. This is normal if it's not severe and should go away after about 2 weeks. Getting as much rest as you can will help. Accept assistance from friends and family members so that you can take breaks from caring for the baby. Call your doctor if your symptoms seem severe, last more than 2 weeks, or seem to be getting worse instead of better. Get help immediately if you have thoughts of wanting to hurt yourself or others! When should you call for help? Call 911 anytime you think you may need emergency care. For example, call if: You pass out (lose consciousness). You have sudden chest pain and shortness of breath, or you cough up blood. You have severe pain in your belly. Call your doctor now or seek immediate medical care if: 
You have heavy vaginal bleeding that soaks one or more pads in an hour, or you have large clots (golf ball size or larger). Your have foul-smelling discharge from your vagina or incision. You are sick to your stomach or cannot keep fluids down. You have pain that does not get better after you take pain medicine. You have signs of infection, such as: Increased pain in your abdomen or vaginal area. Red streaks, warmth, or tenderness of your breasts or the area around your incision. A fever of 101 or greater (taken by mouth). You have signs of a blood clot, such as: 
Pain in your calf, back of knee, thigh, or groin. Redness and swelling in your leg or groin.  
You have trouble passing urine or stool, especially if you have pain or swelling in your lower belly; or if you are unable to have a bowel movement after taking a laxative. You have a fast or pounding heartbeat. Chart Review Routing History No Routing History on File

## 2017-04-13 NOTE — IP AVS SNAPSHOT
[address] 
[phone] Patient: Shaunna Avendaño MRN: [id number] :1985 You are allergic to the following Allergen Reactions Chlorzoxazone Hives Keflex (Cephalexin) Hives Penicillins Hives Immunizations Administered for This Admission Name Date Tdap 2017 Recent Documentation Height Weight Breastfeeding? BMI OB Status Smoking Status 1.676 m 122.5 kg Unknown 43.58 kg/m2 Recent pregnancy Never Smoker Emergency Contacts Name Discharge Info Relation Home Work Mobile Asa Hyatt DISCHARGE CAREGIVER [3] Boyfriend [17] [phone] About your hospitalization You were admitted on:  2017 You last received care in the:  [facility] You were discharged on:  2017 Unit phone number:  [phone] Why you were hospitalized Your primary diagnosis was:  Not on File Your diagnoses also included:  Pregnant Providers Seen During Your Hospitalizations Provider Role Specialty Primary office phone Randolph Aaron MD Attending Provider Obstetrics & Gynecology [phone] Your Primary Care Physician (PCP) Primary Care Physician Office Phone Office Fax OTHER, PHYS ** None ** ** None ** Follow-up Information Follow up With Details Comments Contact Info Ba Arzate MD   Patient can only remember the practice name and not the physician Current Discharge Medication List  
  
START taking these medications Dose & Instructions Dispensing Information Comments Morning Noon Evening Bedtime HYDROcodone-acetaminophen 5-325 mg per tablet Commonly known as:  Ellyn Gut Your last dose was: Your next dose is:    
   
   
 Dose:  1 Tab Take 1 Tab by mouth every six (6) hours as needed. Max Daily Amount: 4 Tabs. Quantity:  30 Tab Refills:  0  
     
   
   
   
  
 ibuprofen 600 mg tablet Commonly known as:  MOTRIN Your last dose was: Your next dose is:    
   
   
 Dose:  600 mg Take 1 Tab by mouth every six (6) hours as needed. Quantity:  30 Tab Refills:  1 CONTINUE these medications which have NOT CHANGED Dose & Instructions Dispensing Information Comments Morning Noon Evening Bedtime  
 albuterol 90 mcg/actuation inhaler Commonly known as:  PROVENTIL HFA, VENTOLIN HFA, PROAIR HFA Your last dose was: Your next dose is: Take  by inhalation. Refills:  0 ASK your doctor about these medications Dose & Instructions Dispensing Information Comments Morning Noon Evening Bedtime HumuLIN N Pen 100 unit/mL (3 mL) Inpn Generic drug:  insulin NPH Your last dose was: Your next dose is:    
   
   
 Dose:  20 Units 20 Units by SubCUTAneous route nightly. Indications: Gestational Diabetes Mellitus Refills:  0  
     
   
   
   
  
 pnv w/o calcium-iron fum-fa 27-1 mg Tab Your last dose was: Your next dose is: Take  by mouth. Refills:  0 Where to Get Your Medications Information on where to get these meds will be given to you by the nurse or doctor. ! Ask your nurse or doctor about these medications HYDROcodone-acetaminophen 5-325 mg per tablet  
 ibuprofen 600 mg tablet Discharge Instructions Discharge Instructions for  Section Patient ID: Arianna Campos 
[id number] 
33 y.o. 
1985 Take Home Medications Continue taking your prenatal vitamins if you are breastfeeding. Follow-up Appointment: 
Follow-up with [facility] in 2 weeks. Follow-up care is a key part of your treatment and safety.  Be sure to make and go to all appointments, and call your doctor if you are having problems. Its also a good idea to know your test results and keep a list of the medicines you take. Activity Avoid lifting more than 10 lbs for 6 weeks after your delivery. Don't put anything in your vagina for 6 weeks (no intercourse, tampons, or douching). Limit climbing stairs to twice a day, and please hold a railing. Have someone else carry your baby up stairs initially, as you may be a bit unsteady. No driving for about 2 weeks or until your are easily able to turn your body and move your foot from the gas to the brake pedal without pain- you need to be able to move quickly enough to respond to traffic. You cannot drive while you are taking narcotics (prescription pain medications). You may shower but do not take a bath for 2 weeks. Be sure to dry your incision well after your shower. You may gradually work up to light exercise such as brisk walking over the next few weeks, but take it easy- you'll be tired and sore! You need to wait 4-6 weeks before starting vigorous exercise such as aerobics, running, weight-lifting, sit-ups, etc.- clear this with your doctor at your postpartum check-up. Although it's important to limit certain activities and avoid \"over-doing it\", walking is good for you and will actually speed the healing process. Walking increases the blood flow to your legs, decreasing the risk of blood clots, and also encourages the bowels to speed up, decreasing constipation, bloating, and gassiness. Don't stay in bed at home, get up and move around the house. You'll feel better more quickly. Diet You may eat a regular diet but you may want to avoid heavy, greasy or spicy foods and other foods that could increase constipation and gas. Wound care Your incision may have been closed with conventional (metal) staples, absorbable (dissolving) staples, or absorbable sutures.   If you still have staples in your incision when you leave the hospital, call your doctor's office as instructed to schedule an appointment to have them removed (usually in about a week). Otherwise, there may be some small tapes over your incision called Steri-strips. Please remove these in about a week. There also may be Dermabond glue over your incision which looks like a clear, shiny coating (as if it were painted with clear fingernail polish). This will gradually peel off over the following weeks, do not remove it. It is normal to have a small amount of clear or reddish drainage from your incision. It's best to leave it open to the air, but if there is drainage, you may cover the incision lightly with gauze, preferably without tape. Keep the incision as dry as possible. You may even consider drying the incision with a cool hair dryer after you shower. Numbness of the skin at or around your incision is normal and the feeling usually returns gradually. Call your doctor if you have a lot of drainage from your incision, an unpleasant odor, red streaks, an increase in pain, or the incision appears to open up. Pain Management You were probably given a prescription for pain medication, similar to the one you've been taking while in the hospital.  In addition to this, you can take over-the-counter pain medicines like Advil or Motrin (ibuprofen). You can take both medications together, alternating doses every few hours. You will get the most relief if you take the maximum dose:  Motrin or Advil (generic ibuprofen) 800 mg every 8 hours (4 tablets or capsules every 8 hours). The prescription you were given probably contains a narcotic mixed with Tylenol, therefore, you should not take any extra Tylenol or acetominophen, or you could be getting more than the safe amount. If you feel you don't need the prescription pain medication, you may take Tylenol instead, along with ibuprofen.   The maximum dose is Tylenol or acetominophen 1000 mg every 6 hours (equivalent to 2 Extra Strength Tylenols every 6 hours). After a few days, you should reduce the amount of prescription pain medication you are taking. At that point, try to use ibuprofen as the main medication, and take the prescription medication if needed for more severe pain not relieved by the ibuprofen. Your goal should be to take only the minimum necessary amounts of the prescription medication (narcotic), as these pain medicines can be addictive and will worsen or cause constipation. Most patients will find that within a few days, their pain is adequately controlled using only over-the-counter medications and minimal doses of prescription pain medicine. A heating pad can also be very helpful for cramping or back pain. Sitz baths are helpful for pain associated with hemorrhoids. You can use either a sitz bath basin or a bathtub filled with 2-3\" inches of plain warm water. Soak for 10 minutes 3 times a day. Over-the-counter hemorrhoid wipes and ointments are fine to use as well. Constipation You may find that bowel movements are irregular after delivery and that you have a tendency to be constipated, especially after surgery. A stool softener such as Colace (docusate) can safely be taken daily if needed. If you become constipated you can use a laxative such as Dulcolax, Miralax or Milk of Magnesia. Don't wait until constipation is severe- take something sooner rather than later and you will feel much better! Suppositories such as Dulcolax or Fleet's Enemas are options too. Your Recovery: What to Expect at [facility] Delivering a baby is hard work and you probably aren't getting much sleep, so you will certainly be tired, especially after having a  section. Try to rest when you can and don't worry about doing housework or other tasks which can wait. The soreness in your incision will improve significantly over the first 2 weeks or so, but it may take 6-8 weeks before you are completely recovered. You are likely to have some back pain or general body aches or muscle soreness. This should improve with acetominophen or ibuprofen. If your legs were swollen during your pregnancy or as a result of IV fluids given during your hospital stay, this should go away in a few days to a week. Most women experience some form of the \"Baby Blues\" after having a baby. This means that you may feel emotional, tearful, frustrated, anxious, sad, and irritable some of the time. This is normal if it's not severe and should go away after about 2 weeks. Getting as much rest as you can will help. Accept assistance from friends and family members so that you can take breaks from caring for the baby. Call your doctor if your symptoms seem severe, last more than 2 weeks, or seem to be getting worse instead of better. Get help immediately if you have thoughts of wanting to hurt yourself or others! When should you call for help? Call 911 anytime you think you may need emergency care. For example, call if: You pass out (lose consciousness). You have sudden chest pain and shortness of breath, or you cough up blood. You have severe pain in your belly. Call your doctor now or seek immediate medical care if: 
You have heavy vaginal bleeding that soaks one or more pads in an hour, or you have large clots (golf ball size or larger). Your have foul-smelling discharge from your vagina or incision. You are sick to your stomach or cannot keep fluids down. You have pain that does not get better after you take pain medicine. You have signs of infection, such as: Increased pain in your abdomen or vaginal area. Red streaks, warmth, or tenderness of your breasts or the area around your incision. A fever of 101 or greater (taken by mouth). You have signs of a blood clot, such as: 
Pain in your calf, back of knee, thigh, or groin. Redness and swelling in your leg or groin. You have trouble passing urine or stool, especially if you have pain or swelling in your lower belly; or if you are unable to have a bowel movement after taking a laxative. You have a fast or pounding heartbeat. Discharge Orders None [employer] Announcement We are excited to announce that we are making your provider's discharge notes available to you in [employer]. You will see these notes when they are completed and signed by the physician that discharged you from your recent hospital stay. If you have any questions or concerns about any information you see in [employer], please call the Health Information Department where you were seen or reach out to your Primary Care Provider for more information about your plan of care. Introducing [facility]! Dewayne Summers introduces [employer] patient portal. Now you can access parts of your medical record, email your doctor's office, and request medication refills online. 1. In your internet browser, go to [email or web address] 2. Click on the First Time User? Click Here link in the Sign In box. You will see the New Member Sign Up page. 3. Enter your [employer] Access Code exactly as it appears below. You will not need to use this code after youve completed the sign-up process. If you do not sign up before the expiration date, you must request a new code. · [employer] Access Code: [email or web address] Expires: 6/27/2017 10:20 AM 
 
4. Enter the last four digits of your Social Security Number (xxxx) and Date of Birth (mm/dd/yyyy) as indicated and click Submit. You will be taken to the next sign-up page. 5. Create a [employer] ID. This will be your [employer] login ID and cannot be changed, so think of one that is secure and easy to remember. 6. Create a [employer] password. You can change your password at any time. 7. Enter your Password Reset Question and Answer. This can be used at a later time if you forget your password. 8. Enter your e-mail address. You will receive e-mail notification when new information is available in [address] 9. Click Sign Up. You can now view and download portions of your medical record. 10. Click the Download Summary menu link to download a portable copy of your medical information. If you have questions, please visit the Frequently Asked Questions section of the [employer] website. Remember, [employer] is NOT to be used for urgent needs. For medical emergencies, dial 911. Now available from your iPhone and Android! General Information Please provide this summary of care documentation to your next provider. Patient Signature:  ____________________________________________________________ Date:  ____________________________________________________________  
  
[facility] Provider Signature:  ____________________________________________________________ Date:  ____________________________________________________________

## 2017-04-14 ENCOUNTER — ANESTHESIA EVENT (OUTPATIENT)
Dept: LABOR AND DELIVERY | Age: 32
DRG: 540 | End: 2017-04-14
Payer: MEDICAID

## 2017-04-14 ENCOUNTER — ANESTHESIA (OUTPATIENT)
Dept: LABOR AND DELIVERY | Age: 32
DRG: 540 | End: 2017-04-14
Payer: MEDICAID

## 2017-04-14 LAB
GLUCOSE BLD STRIP.AUTO-MCNC: 101 MG/DL (ref 65–100)
GLUCOSE BLD STRIP.AUTO-MCNC: 102 MG/DL (ref 65–100)
GLUCOSE BLD STRIP.AUTO-MCNC: 123 MG/DL (ref 65–100)
SERVICE CMNT-IMP: ABNORMAL

## 2017-04-14 PROCEDURE — 77010026065 HC OXYGEN MINIMUM MEDICAL AIR: Performed by: OBSTETRICS & GYNECOLOGY

## 2017-04-14 PROCEDURE — 82962 GLUCOSE BLOOD TEST: CPT

## 2017-04-14 PROCEDURE — 77030034850

## 2017-04-14 PROCEDURE — 00HU33Z INSERTION OF INFUSION DEVICE INTO SPINAL CANAL, PERCUTANEOUS APPROACH: ICD-10-PCS | Performed by: ANESTHESIOLOGY

## 2017-04-14 PROCEDURE — 75410000003 HC RECOV DEL/VAG/CSECN EA 0.5 HR: Performed by: OBSTETRICS & GYNECOLOGY

## 2017-04-14 PROCEDURE — 76010000392 HC C SECN EA ADDL 0.5 HR: Performed by: OBSTETRICS & GYNECOLOGY

## 2017-04-14 PROCEDURE — 77030010848 HC CATH INTUTR PRSS KOLB -B

## 2017-04-14 PROCEDURE — 10907ZC DRAINAGE OF AMNIOTIC FLUID, THERAPEUTIC FROM PRODUCTS OF CONCEPTION, VIA NATURAL OR ARTIFICIAL OPENING: ICD-10-PCS | Performed by: OBSTETRICS & GYNECOLOGY

## 2017-04-14 PROCEDURE — 3E033VJ INTRODUCTION OF OTHER HORMONE INTO PERIPHERAL VEIN, PERCUTANEOUS APPROACH: ICD-10-PCS | Performed by: OBSTETRICS & GYNECOLOGY

## 2017-04-14 PROCEDURE — 74011000250 HC RX REV CODE- 250

## 2017-04-14 PROCEDURE — 76010000391 HC C SECN FIRST 1 HR: Performed by: OBSTETRICS & GYNECOLOGY

## 2017-04-14 PROCEDURE — 74011250636 HC RX REV CODE- 250/636: Performed by: STUDENT IN AN ORGANIZED HEALTH CARE EDUCATION/TRAINING PROGRAM

## 2017-04-14 PROCEDURE — 74011250636 HC RX REV CODE- 250/636: Performed by: OBSTETRICS & GYNECOLOGY

## 2017-04-14 PROCEDURE — 74011250636 HC RX REV CODE- 250/636

## 2017-04-14 PROCEDURE — 65270000029 HC RM PRIVATE

## 2017-04-14 PROCEDURE — 76060000078 HC EPIDURAL ANESTHESIA: Performed by: OBSTETRICS & GYNECOLOGY

## 2017-04-14 PROCEDURE — 77030014125 HC TY EPDRL BBMI -B: Performed by: ANESTHESIOLOGY

## 2017-04-14 PROCEDURE — 74011250636 HC RX REV CODE- 250/636: Performed by: ANESTHESIOLOGY

## 2017-04-14 RX ORDER — SODIUM CHLORIDE, SODIUM LACTATE, POTASSIUM CHLORIDE, CALCIUM CHLORIDE 600; 310; 30; 20 MG/100ML; MG/100ML; MG/100ML; MG/100ML
125 INJECTION, SOLUTION INTRAVENOUS CONTINUOUS
Status: DISCONTINUED | OUTPATIENT
Start: 2017-04-14 | End: 2017-04-17

## 2017-04-14 RX ORDER — OXYTOCIN IN 5 % DEXTROSE 30/500 ML
.5-42 PLASTIC BAG, INJECTION (ML) INTRAVENOUS
Status: DISCONTINUED | OUTPATIENT
Start: 2017-04-14 | End: 2017-04-14

## 2017-04-14 RX ORDER — OXYCODONE HYDROCHLORIDE 5 MG/1
10 TABLET ORAL
Status: ACTIVE | OUTPATIENT
Start: 2017-04-14 | End: 2017-04-15

## 2017-04-14 RX ORDER — HYDROCODONE BITARTRATE AND ACETAMINOPHEN 5; 325 MG/1; MG/1
1 TABLET ORAL
Status: DISCONTINUED | OUTPATIENT
Start: 2017-04-14 | End: 2017-04-17 | Stop reason: HOSPADM

## 2017-04-14 RX ORDER — HYDROMORPHONE HYDROCHLORIDE 1 MG/ML
0.5 INJECTION, SOLUTION INTRAMUSCULAR; INTRAVENOUS; SUBCUTANEOUS
Status: DISPENSED | OUTPATIENT
Start: 2017-04-14 | End: 2017-04-15

## 2017-04-14 RX ORDER — SODIUM CHLORIDE 0.9 % (FLUSH) 0.9 %
5-10 SYRINGE (ML) INJECTION EVERY 8 HOURS
Status: DISCONTINUED | OUTPATIENT
Start: 2017-04-14 | End: 2017-04-17

## 2017-04-14 RX ORDER — CHLOROPROCAINE HYDROCHLORIDE 30 MG/ML
INJECTION, SOLUTION EPIDURAL; INFILTRATION; INTRACAUDAL; PERINEURAL AS NEEDED
Status: DISCONTINUED | OUTPATIENT
Start: 2017-04-14 | End: 2017-04-14 | Stop reason: HOSPADM

## 2017-04-14 RX ORDER — OXYTOCIN/RINGER'S LACTATE 20/1000 ML
125-500 PLASTIC BAG, INJECTION (ML) INTRAVENOUS ONCE
Status: ACTIVE | OUTPATIENT
Start: 2017-04-14 | End: 2017-04-15

## 2017-04-14 RX ORDER — CEFAZOLIN SODIUM IN 0.9 % NACL 2 G/50 ML
INTRAVENOUS SOLUTION, PIGGYBACK (ML) INTRAVENOUS
Status: DISPENSED
Start: 2017-04-14 | End: 2017-04-15

## 2017-04-14 RX ORDER — CEFAZOLIN SODIUM IN 0.9 % NACL 2 G/100 ML
PLASTIC BAG, INJECTION (ML) INTRAVENOUS AS NEEDED
Status: DISCONTINUED | OUTPATIENT
Start: 2017-04-14 | End: 2017-04-14 | Stop reason: HOSPADM

## 2017-04-14 RX ORDER — SODIUM CHLORIDE 0.9 % (FLUSH) 0.9 %
5-10 SYRINGE (ML) INJECTION AS NEEDED
Status: DISCONTINUED | OUTPATIENT
Start: 2017-04-14 | End: 2017-04-17 | Stop reason: HOSPADM

## 2017-04-14 RX ORDER — NALOXONE HYDROCHLORIDE 0.4 MG/ML
0.2 INJECTION, SOLUTION INTRAMUSCULAR; INTRAVENOUS; SUBCUTANEOUS
Status: DISCONTINUED | OUTPATIENT
Start: 2017-04-14 | End: 2017-04-17 | Stop reason: HOSPADM

## 2017-04-14 RX ORDER — MORPHINE SULFATE 0.5 MG/ML
INJECTION, SOLUTION EPIDURAL; INTRATHECAL; INTRAVENOUS AS NEEDED
Status: DISCONTINUED | OUTPATIENT
Start: 2017-04-14 | End: 2017-04-14 | Stop reason: HOSPADM

## 2017-04-14 RX ORDER — DIPHENHYDRAMINE HYDROCHLORIDE 50 MG/ML
12.5 INJECTION, SOLUTION INTRAMUSCULAR; INTRAVENOUS
Status: ACTIVE | OUTPATIENT
Start: 2017-04-14 | End: 2017-04-15

## 2017-04-14 RX ORDER — OXYCODONE HYDROCHLORIDE 5 MG/1
5 TABLET ORAL
Status: DISPENSED | OUTPATIENT
Start: 2017-04-14 | End: 2017-04-15

## 2017-04-14 RX ORDER — IBUPROFEN 600 MG/1
600 TABLET ORAL
Status: DISCONTINUED | OUTPATIENT
Start: 2017-04-14 | End: 2017-04-17 | Stop reason: HOSPADM

## 2017-04-14 RX ORDER — KETOROLAC TROMETHAMINE 30 MG/ML
30 INJECTION, SOLUTION INTRAMUSCULAR; INTRAVENOUS
Status: DISPENSED | OUTPATIENT
Start: 2017-04-14 | End: 2017-04-15

## 2017-04-14 RX ORDER — SIMETHICONE 80 MG
80 TABLET,CHEWABLE ORAL
Status: DISCONTINUED | OUTPATIENT
Start: 2017-04-14 | End: 2017-04-17 | Stop reason: HOSPADM

## 2017-04-14 RX ORDER — OXYTOCIN 10 [USP'U]/ML
INJECTION, SOLUTION INTRAMUSCULAR; INTRAVENOUS AS NEEDED
Status: DISCONTINUED | OUTPATIENT
Start: 2017-04-14 | End: 2017-04-14 | Stop reason: HOSPADM

## 2017-04-14 RX ORDER — KETOROLAC TROMETHAMINE 30 MG/ML
30 INJECTION, SOLUTION INTRAMUSCULAR; INTRAVENOUS
Status: DISCONTINUED | OUTPATIENT
Start: 2017-04-14 | End: 2017-04-14 | Stop reason: SDUPTHER

## 2017-04-14 RX ORDER — LABETALOL HYDROCHLORIDE 5 MG/ML
20 INJECTION, SOLUTION INTRAVENOUS ONCE
Status: ACTIVE | OUTPATIENT
Start: 2017-04-14 | End: 2017-04-15

## 2017-04-14 RX ORDER — ZOLPIDEM TARTRATE 5 MG/1
5 TABLET ORAL
Status: DISCONTINUED | OUTPATIENT
Start: 2017-04-14 | End: 2017-04-17 | Stop reason: HOSPADM

## 2017-04-14 RX ORDER — BISACODYL 5 MG
5 TABLET, DELAYED RELEASE (ENTERIC COATED) ORAL DAILY PRN
Status: DISCONTINUED | OUTPATIENT
Start: 2017-04-14 | End: 2017-04-17 | Stop reason: HOSPADM

## 2017-04-14 RX ORDER — NALOXONE HYDROCHLORIDE 0.4 MG/ML
0.4 INJECTION, SOLUTION INTRAMUSCULAR; INTRAVENOUS; SUBCUTANEOUS AS NEEDED
Status: DISCONTINUED | OUTPATIENT
Start: 2017-04-14 | End: 2017-04-17 | Stop reason: HOSPADM

## 2017-04-14 RX ORDER — FENTANYL/BUPIVACAINE/NS/PF 2-1250MCG
1-16 PREFILLED PUMP RESERVOIR EPIDURAL CONTINUOUS
Status: DISCONTINUED | OUTPATIENT
Start: 2017-04-14 | End: 2017-04-17

## 2017-04-14 RX ORDER — LIDOCAINE HYDROCHLORIDE AND EPINEPHRINE 20; 5 MG/ML; UG/ML
INJECTION, SOLUTION EPIDURAL; INFILTRATION; INTRACAUDAL; PERINEURAL AS NEEDED
Status: DISCONTINUED | OUTPATIENT
Start: 2017-04-14 | End: 2017-04-14 | Stop reason: HOSPADM

## 2017-04-14 RX ORDER — ONDANSETRON 2 MG/ML
INJECTION INTRAMUSCULAR; INTRAVENOUS AS NEEDED
Status: DISCONTINUED | OUTPATIENT
Start: 2017-04-14 | End: 2017-04-14 | Stop reason: HOSPADM

## 2017-04-14 RX ORDER — DIPHENHYDRAMINE HYDROCHLORIDE 50 MG/ML
INJECTION, SOLUTION INTRAMUSCULAR; INTRAVENOUS AS NEEDED
Status: DISCONTINUED | OUTPATIENT
Start: 2017-04-14 | End: 2017-04-14 | Stop reason: HOSPADM

## 2017-04-14 RX ADMIN — KETOROLAC TROMETHAMINE 30 MG: 30 INJECTION, SOLUTION INTRAMUSCULAR at 20:28

## 2017-04-14 RX ADMIN — ONDANSETRON 4 MG: 2 INJECTION INTRAMUSCULAR; INTRAVENOUS at 16:43

## 2017-04-14 RX ADMIN — FENTANYL 0.2 MG/100ML-BUPIV 0.125%-NACL 0.9% EPIDURAL INJ 10 ML/HR: 2/0.125 SOLUTION at 17:18

## 2017-04-14 RX ADMIN — ONDANSETRON 4 MG: 2 INJECTION INTRAMUSCULAR; INTRAVENOUS at 19:02

## 2017-04-14 RX ADMIN — OXYTOCIN 20 UNITS: 10 INJECTION, SOLUTION INTRAMUSCULAR; INTRAVENOUS at 18:46

## 2017-04-14 RX ADMIN — LIDOCAINE HYDROCHLORIDE AND EPINEPHRINE 10 ML: 20; 5 INJECTION, SOLUTION EPIDURAL; INFILTRATION; INTRACAUDAL; PERINEURAL at 18:45

## 2017-04-14 RX ADMIN — LIDOCAINE HYDROCHLORIDE AND EPINEPHRINE 10 ML: 20; 5 INJECTION, SOLUTION EPIDURAL; INFILTRATION; INTRACAUDAL; PERINEURAL at 17:21

## 2017-04-14 RX ADMIN — CHLOROPROCAINE HYDROCHLORIDE 20 ML: 30 INJECTION, SOLUTION EPIDURAL; INFILTRATION; INTRACAUDAL; PERINEURAL at 18:40

## 2017-04-14 RX ADMIN — Medication 2 MILLI-UNITS/MIN: at 07:24

## 2017-04-14 RX ADMIN — SODIUM CHLORIDE, SODIUM LACTATE, POTASSIUM CHLORIDE, AND CALCIUM CHLORIDE 125 ML/HR: 600; 310; 30; 20 INJECTION, SOLUTION INTRAVENOUS at 06:17

## 2017-04-14 RX ADMIN — Medication 2 G: at 18:44

## 2017-04-14 RX ADMIN — DIPHENHYDRAMINE HYDROCHLORIDE 50 MG: 50 INJECTION, SOLUTION INTRAMUSCULAR; INTRAVENOUS at 19:06

## 2017-04-14 RX ADMIN — HYDROMORPHONE HYDROCHLORIDE 0.5 MG: 1 INJECTION, SOLUTION INTRAMUSCULAR; INTRAVENOUS; SUBCUTANEOUS at 21:41

## 2017-04-14 RX ADMIN — MORPHINE SULFATE 3 MG: 0.5 INJECTION, SOLUTION EPIDURAL; INTRATHECAL; INTRAVENOUS at 19:25

## 2017-04-14 NOTE — PROGRESS NOTES
0700-  Bedside and Verbal shift change report given to Micheal Coleman, RN (oncoming nurse) by Colleen Chávez, HAMILTON (offgoing nurse). Report included the following information SBAR, Intake/Output, MAR, Recent Results and Med Rec Status. 9477- Dr Rita Almodovar has reviewed tracing and orders to start Pitocin at this time. 0730- Dr Rita Almodovar at bedside. SVE 4/50/-3. AROM at this time. [address] [facility] Dr Rita Almodovar at bedside. SVE 4-5/70/-2. Internal monitors placed. Plan of care discussed with patient and . 65- Dr Luz Arvizu at bedside to replace FSE and IUPC. Pt requesting epidural.  Bolus started. 36- Dr Rita Almodovar at bedside. 5- Dr Niraj Sanchez at bedside for epidural placement. 1745- Pt with late decel into 66's. Dr All Pelayo at bedside. SVE unchanged. Pt bolused, Pitocin stopped, placed on 02, repositioned and placed in trendelenburg. Baby recovered. 1758- Pt with another late decel into 63's. Repositioned and given 02.  1820- Dr All Pelayo made aware that pt continues to have late decels and Pitocin has not been re started. 200- Dr All Pelayo at bedside. SVE Unchanged. C Section called. 1831- Deceleration into 60's. To OR stat. 1845- Bedside and Verbal shift change report given to Wander Fountain (oncoming nurse) by Melvin Villafana (offgoing nurse). Report included the following information SBAR.

## 2017-04-14 NOTE — PROGRESS NOTES
Labor Progress Note  Patient seen, fetal heart rate and contraction pattern evaluated at 627 pm    Physical Exam:  Cervical Exam was examined   5 cm dilated    70% effaced    -2 station    Presenting Part: cephalic  Cervical Position: mid position  Consistency: Medium    Membranes:  Intact  Uterine Activity[de-identified] Frequency: Every 3 minutes  Fetal Heart Rate: persistent declels. Assessment/Plan:    Proceed with  Section Nonreassuring fetal status with labor not progressing normally, findings consistent with failure of dilatation. Recommended proceeding with  delivery. Risks of bleeding, infection, bladder and bowel damage explained to patient and father of baby. They understand the situation and consent to the  delivery. Reassuring fetal status. Continue current managent.   Estefani Hidalgo MD  2017  6:31 PM

## 2017-04-14 NOTE — PROGRESS NOTES
Labor Progress Note  Patient seen, fetal heart rate and contraction pattern evaluated at 1244 pm. At 1237p the patient had a 2 minute variable followed by another 2 min variable with a marky to 80. Patient was repositioned, her pitocin was discontinued and fetal tracing improved. Physical Exam:  Vitals:   Vitals:    04/14/17 0918 04/14/17 0923 04/14/17 1057 04/14/17 1216   BP:  (!) 146/91     Pulse:  75     Resp:       Temp:   98.1 °F (36.7 °C) 98.2 °F (36.8 °C)   SpO2: 97%      Weight:       Height:             Cervical Exam was examined   4 cm dilated    50% effaced    -2 station    Presenting Part: cephalic  Cervical Position: mid position    Uterine Activity[de-identified] Frequency: 3-4 mins  Fetal Heart Rate: Baseline: 140 per minute  Variability: moderate  Accelerations: yes  Decelerations: variable  Pitocin: off  IUPC/FSE placed    Assessment/Plan:  Ms. Amy Dawkins is admitted with pregnancy at 37w0d for IOL: Pre-eclampsia without severe features and GDM: insulin (non-compliant). Fetal tracing was category 2 now improved. Will hold pitocin currently. Restart in 30 mins. BP in mild range. Monitor.          Kian Livingston MD  4/14/2017  1:03 PM

## 2017-04-14 NOTE — PROGRESS NOTES
Labor Progress Note  Patient seen, fetal heart rate and contraction pattern evaluated at 730am. Patient is doing well and without complaints of HA or blurred vision. Intra-cervical osei was removed and pitocin was started this morning. Physical Exam:  Vitals:   Vitals:    04/13/17 1912 04/13/17 2206 04/14/17 0452 04/14/17 0710   BP: 149/76 145/78 143/76 137/73   Pulse: 77 75 72 85   Resp: 16   16   Temp: 97.8 °F (36.6 °C)   98.3 °F (36.8 °C)   SpO2: 99%      Weight:       Height:             Cervical Exam was examined   4 cm dilated    50% effaced    -2 station    Presenting Part: cephalic  Cervical Position: posterior  Consistency: Medium  AROM done, clear fluid noted    Uterine Activity[de-identified] Frequency: Every 2 minutes  Fetal Heart Rate: Baseline: 130 per minute  Variability: moderate  Accelerations: yes  Decelerations: variable  Pitocin: 2    Assessment/Plan:  Ms. Geovanna Hernandez is admitted with pregnancy at 37w0d for IOL for pre-eclampsia without severe features and GDM: insulin with poor compliance. Currently doing well. - Continue pitocin. Epidural PRN.    - GDM: Monitor blood sugars every 4 hrs. - Pre-eclampsia: BP in mild range. Monitor.        Anthony Lawrence MD  4/14/2017  8:04 AM

## 2017-04-14 NOTE — ANESTHESIA PREPROCEDURE EVALUATION
Anesthetic History   No history of anesthetic complications            Review of Systems / Medical History  Patient summary reviewed, nursing notes reviewed and pertinent labs reviewed    Pulmonary            Asthma : well controlled       Neuro/Psych   Within defined limits           Cardiovascular    Hypertension (Pre-E without severe symptoms)                   GI/Hepatic/Renal                Endo/Other    Diabetes (gestational)         Other Findings            Physical Exam    Airway  Mallampati: II  TM Distance: 4 - 6 cm  Neck ROM: normal range of motion   Mouth opening: Normal     Cardiovascular  Regular rate and rhythm,  S1 and S2 normal,  no murmur, click, rub, or gallop  Rhythm: regular  Rate: normal         Dental  No notable dental hx       Pulmonary  Breath sounds clear to auscultation               Abdominal  GI exam deferred       Other Findings            Anesthetic Plan    ASA: 3  Anesthesia type: epidural      Post-op pain plan if not by surgeon: epidural opioid      Anesthetic plan and risks discussed with: Patient      Late entry - preop done, questions answered, and consent obtained prior to procedure; note entered after procedure at 8:19 PM.

## 2017-04-14 NOTE — DISCHARGE SUMMARY
Patient ID:  Muna Frey  [id number]  43 y.o.  1985    Admit Date: 2017    Discharge Date: 17     Admitting Physician: Roberto Maguire MD    Attending Physician: Roberto Maguire MD    Admission Diagnoses: Pregnant    Procedures for this admission: Procedure(s):   SECTION    Discharge Diagnoses: Same as above with LFCS producing a viable infant. Information for the patient's :  Geovanna Hernandez, Male [[id number]]   One Minute Apgar: 5 (Filed from Delivery Summary)  Five  Minute Apgar: 9 (Filed from Delivery Summary)        Discharge Disposition:  home    Discharge Condition:  stable    Additional Diagnoses: preeclampsia. Maternal Labs:   Lab Results   Component Value Date/Time    HBsAg, External Negative 2016    HIV, External Negative 2016    Rubella, External Immune 2016    RPR, External Non-Reactive 2016    GrBStrep, External Negative 2017       Cord Blood Results:   Information for the patient's :  Geovanna Hernandez Male [[id number]]   No results found for: PCTABR, ABORH, PCTDIG, BILI, ABORH, ABORHEXT          History of Present Illness:   OB History as of 17      Para Term  AB TAB SAB Ectopic Multiple Living    2 2 2      0 1        Admitted for induction of labor. Hospital Course:   Patient was admitted as above and delivered via LFCS . Please the chart for details. The postpartum course was unremarkable. She was deemed stable for discharge home on day 3.     Follow-up Care: 2 w        Signed:  Xiao Ricketts MD  2017  7:51 PM

## 2017-04-14 NOTE — PROGRESS NOTES
Labor Progress Note  Patient seen, fetal heart rate and contraction pattern evaluated at 445pm. Patient is feeling a lot of pain with her contractions and wants an epidural.  Her FSE was replaced by the hospitalist b/jv. Physical Exam:  Vitals:   Vitals:    04/14/17 1611 04/14/17 1612 04/14/17 1622 04/14/17 1629   BP: (!) 149/98      Pulse: 91      Resp:       Temp:       SpO2:  98% 98% 97%   Weight:       Height:             Cervical Exam was examined   4 cm dilated    80% effaced    -2 station      Uterine Activity[de-identified] Frequency: Every 2 minutes  Fetal Heart Rate: Baseline: 150 per minute  Variability: moderate  Accelerations: yes  Decelerations: early  Pitocin: turned off for patient discomfort  IUPC/FSE in place    Assessment/Plan:  Ms. Tavo Crenshaw is admitted with pregnancy at 37w0d IOL due to Pre-Eclampsia and GDM: insulin controlled. Will do epidural now.      Olinda Lewis MD  4/14/2017  4:50 PM

## 2017-04-14 NOTE — PROGRESS NOTES
Called to see patient to replace malfunctioning FSE  ve 5/90/-2  Bedside ultrasound done to confirm FHR since artifact noted with FSE  Better with replacement FSE  fse removed and replaced without difficulty  IUPC replaced as well  Discussed pain management with patient and the need to increase augmentation and improve positioning to help bring vtx down   Reviewed options patient requesting epidural for pain  RN to bolus patient for epidural

## 2017-04-14 NOTE — L&D DELIVERY NOTE
Delivery Summary    Patient: Jesús Ramirez MRN: [id number]  SSN: [id number]    YOB: 1985  Age: 33 y.o. Sex: female        Labor Events:    Labor: No    Rupture Date:      Rupture Time:      Rupture Type: AROM    Amniotic Fluid Volume: Moderate     Amniotic Fluid Description:  Amniotic fluid Odor: Clear          Induction: Raymond Bulb (balloon); Oxytocin         Induction Date:        Induction Time:       Indications for Induction: Mild Preeclampsia; Gestational Hypertension     Augmentation:      Augmentation Date:      Augmentation Time:      Indications for Augmentation:      Events:        Cervical Ripening: Raymond/EASI    Rupture Identifier: Rupture 1     Labor complications: Fetal Intolerance     Additional complications:         Delivery Events:  Estimated Blood Loss (ml): 800      Information for the patient's :  Ann Alfie, Male [[id number]]     Delivery Summary - Baby    Delivery Date: 2017  Delivery Time: 6:46 PM  Delivery Type: , Low Transverse    Section Delivery:     Sex:  male     Gestational Age: 37w0d  Delivery Clinician:  Burak Pop Oneil De Jacobson 136?: Yes  Delivery Location: L&D           APGARS  One minute Five minutes Ten minutes   Skin color:            Heart rate:            Grimace:            Muscle tone:            Breathing: Totals:               Presentation: Vertex    Position:   Occiput    Resuscitation Method:  Suctioning-bulb; Tactile Stimulation     Meconium Stained: None      Cord Vessels: 3 Vessels      Cord Events:    Cord Blood Sent?:  No    Blood Gases Sent?:  No    Placenta:  Date/Time:   6:47 PM  Removal: Manual Removal      Appearance: Intact      Measurements:  Birth Weight: 3.185 kg      Birth Length: 48.3 cm      Head Circumference: 35.5 cm      Chest Circumference: 33 cm     Abdominal Girth: 29.5 cm    Other Providers:           Group Beta Strep:   Lab Results   Component Value Date/Time Zeinab, External Negative 2017        Cord Blood Results:  Information for the patient's :  Cedric Elder, Male [[id number]]   No results found for: Annie Mends, PCTDIG, BILI, ABORHEXT, ABORH    Information for the patient's :  Cedric Elder, Male [[id number]]   No results found for: APH, APCO2, APO2, AHCO3, ABEC, ABDC, O2ST, SITE, [place], PHI, [place], PO2I, HCO3I, SO2I, IBD    Information for the patient's :  Cedric Elder, Male [[id number]]   No results found for: EPHV, PCO2V, PO2V, HCO3V, O2STV, EBDV

## 2017-04-14 NOTE — PROGRESS NOTES
0700-  Bedside and Verbal shift change report given to Jose Muñoz RN (oncoming nurse) by Rosaline Correa RN (offgoing nurse). Report included the following information SBAR, Intake/Output, MAR, Recent Results and Med Rec Status.

## 2017-04-14 NOTE — PROGRESS NOTES
Labor Progress Note  Patient seen, fetal heart rate and contraction pattern evaluated at 550 pm    Physical Exam:  Cervical Exam was examined   5 cm dilated    60% effaced    -2 station    Presenting Part: cephalic  Cervical Position: mid position  Consistency: Medium    Membranes:  Intact  Uterine Activity[de-identified] Frequency: Every 3 -5 minutes  Fetal Heart Rate: one deceleration noted. Pitocin was on 4, turned off. Good btb variability. Recovered now. Assessment/Plan:    Continue with management. Reassuring fetal status. Continue current managent.   Greyson Conroy MD  4/14/2017  5:53 PM

## 2017-04-14 NOTE — ANESTHESIA PROCEDURE NOTES
Epidural Block    Start time: 4/14/2017 5:10 PM  End time: 4/14/2017 5:24 PM  Performed by: Chantelle Miles by: Baldemar Brar     Pre-Procedure  Indication: labor epidural    Preanesthetic Checklist: patient identified, risks and benefits discussed, anesthesia consent, patient being monitored, timeout performed and anesthesia consent    Timeout Time: 17:10        Epidural:   Patient position:  Seated  Prep region:  Lumbar  Prep: Betadine    Location:  L3-4    Needle and Epidural Catheter:   Needle Type:  Tuohy  Needle Gauge:  17 G  Injection Technique:  Loss of resistance using air  Attempts:  2  Catheter Size:  20 G  Catheter at Skin Depth (cm):  12  Depth in Epidural Space (cm):  5  Events: no blood with aspiration, no cerebrospinal fluid with aspiration and negative aspiration test    Test Dose:  Lidocaine 1.5% w/ epi and negative    Assessment:   Catheter Secured:  Tape  Insertion:  Uncomplicated  Patient tolerance:  Patient tolerated the procedure well with no immediate complications

## 2017-04-15 LAB
BASOPHILS # BLD AUTO: 0 K/UL (ref 0–0.1)
BASOPHILS # BLD: 0 % (ref 0–1)
EOSINOPHIL # BLD: 0 K/UL (ref 0–0.4)
EOSINOPHIL NFR BLD: 0 % (ref 0–7)
ERYTHROCYTE [DISTWIDTH] IN BLOOD BY AUTOMATED COUNT: 14.7 % (ref 11.5–14.5)
GLUCOSE BLD STRIP.AUTO-MCNC: 96 MG/DL (ref 65–100)
HCT VFR BLD AUTO: 31 % (ref 35–47)
HGB BLD-MCNC: 10.2 G/DL (ref 11.5–16)
LYMPHOCYTES # BLD AUTO: 19 % (ref 12–49)
LYMPHOCYTES # BLD: 2.1 K/UL (ref 0.8–3.5)
MCH RBC QN AUTO: 26.6 PG (ref 26–34)
MCHC RBC AUTO-ENTMCNC: 32.9 G/DL (ref 30–36.5)
MCV RBC AUTO: 80.9 FL (ref 80–99)
MONOCYTES # BLD: 0.5 K/UL (ref 0–1)
MONOCYTES NFR BLD AUTO: 5 % (ref 5–13)
NEUTS SEG # BLD: 8.1 K/UL (ref 1.8–8)
NEUTS SEG NFR BLD AUTO: 76 % (ref 32–75)
PLATELET # BLD AUTO: 189 K/UL (ref 150–400)
RBC # BLD AUTO: 3.83 M/UL (ref 3.8–5.2)
SERVICE CMNT-IMP: NORMAL
WBC # BLD AUTO: 10.7 K/UL (ref 3.6–11)

## 2017-04-15 PROCEDURE — 82962 GLUCOSE BLOOD TEST: CPT

## 2017-04-15 PROCEDURE — 74011250637 HC RX REV CODE- 250/637: Performed by: OBSTETRICS & GYNECOLOGY

## 2017-04-15 PROCEDURE — 85025 COMPLETE CBC W/AUTO DIFF WBC: CPT | Performed by: OBSTETRICS & GYNECOLOGY

## 2017-04-15 PROCEDURE — 36415 COLL VENOUS BLD VENIPUNCTURE: CPT | Performed by: OBSTETRICS & GYNECOLOGY

## 2017-04-15 PROCEDURE — 74011250637 HC RX REV CODE- 250/637: Performed by: STUDENT IN AN ORGANIZED HEALTH CARE EDUCATION/TRAINING PROGRAM

## 2017-04-15 PROCEDURE — 65270000029 HC RM PRIVATE

## 2017-04-15 PROCEDURE — 74011250636 HC RX REV CODE- 250/636: Performed by: OBSTETRICS & GYNECOLOGY

## 2017-04-15 PROCEDURE — 74011250637 HC RX REV CODE- 250/637: Performed by: ANESTHESIOLOGY

## 2017-04-15 RX ADMIN — SIMETHICONE 80 MG: 80 TABLET, CHEWABLE ORAL at 20:10

## 2017-04-15 RX ADMIN — IBUPROFEN 600 MG: 600 TABLET, FILM COATED ORAL at 11:10

## 2017-04-15 RX ADMIN — Medication 10 ML: at 06:51

## 2017-04-15 RX ADMIN — BISACODYL 5 MG: 5 TABLET, DELAYED RELEASE ORAL at 21:29

## 2017-04-15 RX ADMIN — ACETAMINOPHEN 650 MG: 325 TABLET ORAL at 07:26

## 2017-04-15 RX ADMIN — OXYCODONE HYDROCHLORIDE 5 MG: 5 TABLET ORAL at 15:08

## 2017-04-15 RX ADMIN — KETOROLAC TROMETHAMINE 30 MG: 30 INJECTION, SOLUTION INTRAMUSCULAR at 02:38

## 2017-04-15 RX ADMIN — IBUPROFEN 600 MG: 600 TABLET, FILM COATED ORAL at 17:15

## 2017-04-15 RX ADMIN — SIMETHICONE 80 MG: 80 TABLET, CHEWABLE ORAL at 15:12

## 2017-04-15 NOTE — PROGRESS NOTES
Bedside shift change report given to Jimenez Connelly RN (oncoming nurse) by Nate Peres RN (offgoing nurse). Report included the following information SBAR, Kardex, Intake/Output and MAR.

## 2017-04-15 NOTE — ANESTHESIA POSTPROCEDURE EVALUATION
Post-Anesthesia Evaluation and Assessment    Patient: Jaxson Harvey MRN: [id number]  SSN: [id number]    YOB: 1985  Age: 33 y.o. Sex: female       Cardiovascular Function/Vital Signs  Visit Vitals    /78    Pulse 79    Temp 36.6 °C (97.9 °F)    Resp 16    Ht 5' 6\" (1.676 m)    Wt 122.5 kg (270 lb)    SpO2 97%    Breastfeeding Unknown    BMI 43.58 kg/m2       Patient is status post epidural anesthesia for Procedure(s):   SECTION. Nausea/Vomiting: None    Postoperative hydration reviewed and adequate. Pain:  Pain Scale 1: Numeric (0 - 10) (17)  Pain Intensity 1: 5 (17)   Managed    Neurological Status:   Neuro (WDL): Exceptions to WDL (17)  Neuro  LLE Motor Response: Numbness;Tingling (17)  RLE Motor Response: Numbness;Tingling (17)   At baseline    Mental Status and Level of Consciousness: Arousable    Pulmonary Status:   O2 Device: Room air (17)   Adequate oxygenation and airway patent    Complications related to anesthesia: None    Post-anesthesia assessment completed.  No concerns    Signed By: Cecil Sandhu MD     2017

## 2017-04-15 NOTE — PROGRESS NOTES
Problem: Vaginal Delivery: Day of Deliver-Laboring  Goal: Off Pathway (Use only if patient is Off Pathway)  Outcome: Resolved/Met Date Met:  17  Proceed with

## 2017-04-15 NOTE — PROGRESS NOTES
Problem:  Delivery: Day of Delivery  Goal: Off Pathway (Use only if patient is Off Pathway)  Outcome: Resolved/Met Date Met:  17  Proceed with

## 2017-04-15 NOTE — OP NOTES
Zachary Rosario [address]ell [address]   [address] [facility], [address]   OP NOTE       Name:  Estrella Argueta   MR#:  [id number]   :  1985   Account #:  [de-identified]    Surgery Date:  2017   Date of Adm:  2017       PREOPERATIVE DIAGNOSIS: Nonreassuring fetal heart rate tracing. POSTOPERATIVE DIAGNOSIS: Nonreassuring fetal heart rate   tracing. PROCEDURES PERFORMED: Primary low transverse    section. ESTIMATED BLOOD LOSS: 800 mL. SPECIMENS REMOVED: placenta    ANESTHESIA:   epidural    INDICATIONS: The patient was being induced for preeclampsia and   had been having intermittent variable decelerations all day and then   she started to have late decelerations. Pitocin had been switched off. The baby could not tolerate any contractions whatsoever and when   she started having persistent late decelerations, I called for the C-  section. She was advised of risks, benefits and alternatives of the   procedure, risks including those of bleeding, infection, injury to   surrounding structures. All questions were answered and the   procedure was done. DESCRIPTION OF PROCEDURE: She received Ancef preoperatively. She had an indwelling Raymond catheter and SCDs on her extremities and   she was advised of risks, benefits and alternatives of the procedure,   risks including those of bleeding, infection, injury to surrounding   structures and all questions were answered. A time-out was done prior   to the start of the procedure after she was prepped and draped and the   epidural was topped up after she had a prolonged deceleration and   then it had recovered by the time we had moved her to the operating   room and the epidural was topped up as the baby was in the 150s at   that point. Skin was tested prior to making the Pfannenstiel skin   incision, which was made with a knife.  Fascia was incised transversely   after the fatty tissue and extended with scissors and  from   the underlying muscle with sharp dissection, both superiorly and   inferiorly. An opening was made bluntly in the peritoneal cavity and   expanded digitally. A bladder blade was inserted at the lower end of   the peritoneal incision. Uterovesical fold of peritoneum was picked up   and cut open using Metzenbaum scissors. Bladder flap was created   and held on using the bladder blade. A low transverse uterine incision   was made with a knife and expanded digitally. Baby was delivered   cephalic presentation with gentle fundal pressure and cord was double   clamped and cut and handed over to the NICU in stable condition. [de-identified] sex is male. Apgars 9 at 1 minute and 9 at 5 minutes,   respectively. IV Pitocin was running. Placenta was delivered   spontaneously. The uterus was exteriorized. The cavity was cleared of   products of conception. Tubes and ovaries were normal. The uterus   was closed in 2 layers with continuous locking suture of 0 chromic   catgut and hemostasis was achieved. A couple of figure-of-eight   sutures were also placed at the right third junction with the mid third to   obtain hemostasis. The uterus was placed back into the peritoneal   cavity. The incision was inspected once more and found to be   hemostatic. Paracolic gutters were cleared of amniotic fluid, blood and   debris. Edges of the peritoneum were held with [place] Jeffry clamps and   closed with a continuous nonlocking suture of 0 Vicryl after Seprafilm was placed in the lower uterine segment. Fascia   was then picked up and inspected carefully underneath and on top of it   and hemostasis achieved with electrocautery and then it was closed in   2 halves, each with a continuous nonlocking suture of 0 Vicryl.    Subcutaneous fatty tissue was approximated using 3-0 Vicryl   continuous nonlocking, and skin approximated using subcuticular 3-0   Vicryl and Dermabond was applied and then a pressure dressing was   applied after the Dermabond dried. Estimated blood loss was about   800 mL. There were no complications. Urine was clear. Sponge count   and instrument counts were correct. The patient was taken to recovery   room in stable condition at the end of the operative procedure.         MD Durga Nguyen / Geovany Cummings   D:  04/14/2017   20:28   T:  04/14/2017   20:59   Job #:  [id number]

## 2017-04-15 NOTE — PROGRESS NOTES
Post-Operative  Day 1    Bertrum Dose         Information for the patient's :  Pierce Mackey, Male [[id number]]   , Low Transverse   Patient doing well without unusual complaints. Tolerating liquids, no flatus    Vitals:  Visit Vitals    /68 (BP 1 Location: Left arm)    Pulse 74    Temp 98.9 °F (37.2 °C)    Resp 16    Ht 5' 6\" (1.676 m)    Wt 122.5 kg (270 lb)    SpO2 96%    Breastfeeding Unknown    BMI 43.58 kg/m2     Temp (24hrs), Av.3 °F (36.8 °C), Min:97.6 °F (36.4 °C), Max:98.9 °F (37.2 °C)      Last 24hr Input/Output:    Intake/Output Summary (Last 24 hours) at 04/15/17 0937  Last data filed at 04/15/17 0828   Gross per 24 hour   Intake              900 ml   Output             3600 ml   Net            -2700 ml          Exam:                  bottle     Patient without distress. CVS S1 S2 normal.      RS normal breath sounds  Abdomen:soft, expected mild tenderness, fundus firm, wound dressing intact     Lower extremities are no tenderness mild with  edema.     Labs:   Lab Results   Component Value Date/Time    WBC 10.7 04/15/2017 04:14 AM    WBC 13.1 2017 05:59 PM    WBC 11.1 04/10/2017 05:44 AM    WBC 9.9 2017 03:40 PM    HGB 10.2 04/15/2017 04:14 AM    HGB 11.4 2017 05:59 PM    HGB 10.6 04/10/2017 05:44 AM    HGB 10.8 2017 03:40 PM    HCT 31.0 04/15/2017 04:14 AM    HCT 33.6 2017 05:59 PM    HCT 33.8 04/10/2017 05:44 AM    HCT 33.8 2017 03:40 PM    PLATELET 095  04:14 AM    PLATELET 891  05:59 PM    PLATELET 103  05:44 AM    PLATELET 861  03:40 PM       Recent Results (from the past 24 hour(s))   GLUCOSE, POC    Collection Time: 17  5:41 PM   Result Value Ref Range    Glucose (POC) 123 (H) 65 - 100 mg/dL    Performed by [employer] Jolie    CBC WITH AUTOMATED DIFF    Collection Time: 04/15/17  4:14 AM   Result Value Ref Range    WBC 10.7 3.6 - 11.0 K/uL    RBC 3.83 3.80 - 5.20 M/uL    HGB 10.2 (L) 11.5 - 16.0 g/dL    HCT 31.0 (L) 35.0 - 47.0 %    MCV 80.9 80.0 - 99.0 FL    MCH 26.6 26.0 - 34.0 PG    MCHC 32.9 30.0 - 36.5 g/dL    RDW 14.7 (H) 11.5 - 14.5 %    PLATELET 989 292 - 481 K/uL    NEUTROPHILS 76 (H) 32 - 75 %    LYMPHOCYTES 19 12 - 49 %    MONOCYTES 5 5 - 13 %    EOSINOPHILS 0 0 - 7 %    BASOPHILS 0 0 - 1 %    ABS. NEUTROPHILS 8.1 (H) 1.8 - 8.0 K/UL    ABS. LYMPHOCYTES 2.1 0.8 - 3.5 K/UL    ABS. MONOCYTES 0.5 0.0 - 1.0 K/UL    ABS. EOSINOPHILS 0.0 0.0 - 0.4 K/UL    ABS. BASOPHILS 0.0 0.0 - 0.1 K/UL   GLUCOSE, POC    Collection Time: 04/15/17  6:19 AM   Result Value Ref Range    Glucose (POC) 96 65 - 100 mg/dL    Performed by Tara Gtz            Assessment: Post-Op day 1, stable      Plan:   1. Routine post-operative care   2. The risks and benefits of the circumcision  procedure and anesthesia including: bleeding, infection, variability of cosmetic results were discussed at length with the mother. She is aware that future repeat procedures may be necessary. She gives informed consent to proceed as noted and her questions are answered.

## 2017-04-15 NOTE — PROGRESS NOTES
2319: Dr. Ardell Shone notified patient did not receive IV labetalol secondary to decreased BP's after pain medication administration. MD updated on current BP's. No new orders. 2340: TRANSFER - OUT REPORT:    Verbal report given to SPIKE Robbins RN(name) on [facility]  being transferred to MIU(unit) for routine progression of care       Report consisted of patients Situation, Background, Assessment and   Recommendations(SBAR). Information from the following report(s) SBAR, OR Summary, Procedure Summary, Intake/Output and MAR was reviewed with the receiving nurse. Lines:   Peripheral IV 04/14/17 Right Forearm (Active)   Site Assessment Clean, dry, & intact 4/14/2017  1:39 PM   Phlebitis Assessment 0 4/14/2017  1:39 PM   Infiltration Assessment 0 4/14/2017  1:39 PM   Dressing Status Clean, dry, & intact 4/14/2017  1:39 PM   Dressing Type Transparent 4/14/2017  1:39 PM   Hub Color/Line Status Pink 4/14/2017  1:39 PM        Opportunity for questions and clarification was provided.       Patient transported with:   Registered Nurse

## 2017-04-15 NOTE — PROGRESS NOTES
SBAR IN Report: Mother    Verbal report received from Sydni Marin RN (full name & credentials) on this patient, who is now being transferred from L&D (unit) for routine progression of care. Report consisted of patient's Situation, Background, Assessment and Recommendations (SBAR).  ID bands were compared with the identification form, and verified with the patient and transferring nurse. Information from the SBAR, Kardex, Intake/Output and MAR and the [place] Report was reviewed with the transferring nurse; opportunity for questions and clarification provided.

## 2017-04-16 PROCEDURE — 65270000029 HC RM PRIVATE

## 2017-04-16 PROCEDURE — 74011250637 HC RX REV CODE- 250/637: Performed by: OBSTETRICS & GYNECOLOGY

## 2017-04-16 PROCEDURE — 74011250637 HC RX REV CODE- 250/637: Performed by: STUDENT IN AN ORGANIZED HEALTH CARE EDUCATION/TRAINING PROGRAM

## 2017-04-16 RX ADMIN — IBUPROFEN 600 MG: 600 TABLET, FILM COATED ORAL at 01:19

## 2017-04-16 RX ADMIN — ACETAMINOPHEN 650 MG: 325 TABLET ORAL at 14:43

## 2017-04-16 RX ADMIN — SIMETHICONE 80 MG: 80 TABLET, CHEWABLE ORAL at 18:30

## 2017-04-16 RX ADMIN — SIMETHICONE 80 MG: 80 TABLET, CHEWABLE ORAL at 01:19

## 2017-04-16 RX ADMIN — BISACODYL 5 MG: 5 TABLET, DELAYED RELEASE ORAL at 23:04

## 2017-04-16 RX ADMIN — HYDROCODONE BITARTRATE AND ACETAMINOPHEN 1 TABLET: 5; 325 TABLET ORAL at 23:00

## 2017-04-16 RX ADMIN — IBUPROFEN 600 MG: 600 TABLET, FILM COATED ORAL at 13:14

## 2017-04-16 RX ADMIN — SIMETHICONE 80 MG: 80 TABLET, CHEWABLE ORAL at 13:14

## 2017-04-16 RX ADMIN — HYDROCODONE BITARTRATE AND ACETAMINOPHEN 1 TABLET: 5; 325 TABLET ORAL at 03:47

## 2017-04-16 RX ADMIN — IBUPROFEN 600 MG: 600 TABLET, FILM COATED ORAL at 07:36

## 2017-04-16 NOTE — PROGRESS NOTES
Post-Operative  Day 2    [facility]     Information for the patient's :  Rontony Mejia, Male [[id number]]   , Low Transverse   Patient doing well without unusual complaints. Passing flatus, voiding and ambulating without difficulty. Tolerating regular diet. Vitals:  Visit Vitals    /66 (BP 1 Location: Right arm, BP Patient Position: At rest)    Pulse 77    Temp 98.4 °F (36.9 °C)    Resp 18    Ht 5' 6\" (1.676 m)    Wt 122.5 kg (270 lb)    SpO2 96%    Breastfeeding Unknown    BMI 43.58 kg/m2     Temp (24hrs), Av.4 °F (36.9 °C), Min:98.3 °F (36.8 °C), Max:98.6 °F (37 °C)        Exam:          bottle feeding    Patient without distress      CVS S1 S2 normal.      RS normal breath sounds. Abdomen: soft, expected tenderness, fundus firm        Wound incision clean, dry and intact                  Lower extremities are nontender . with/without edema. No cords    Labs:   Lab Results   Component Value Date/Time    WBC 10.7 04/15/2017 04:14 AM    WBC 13.1 2017 05:59 PM    WBC 11.1 04/10/2017 05:44 AM    WBC 9.9 2017 03:40 PM    HGB 10.2 04/15/2017 04:14 AM    HGB 11.4 2017 05:59 PM    HGB 10.6 04/10/2017 05:44 AM    HGB 10.8 2017 03:40 PM    HCT 31.0 04/15/2017 04:14 AM    HCT 33.6 2017 05:59 PM    HCT 33.8 04/10/2017 05:44 AM    HCT 33.8 2017 03:40 PM    PLATELET 631  04:14 AM    PLATELET 624  05:59 PM    PLATELET 907  05:44 AM    PLATELET 812  03:40 PM       No results found for this or any previous visit (from the past 24 hour(s)). Assessment: Post-Op day 2, doing well      Plan:   1.  Routine post-operative care

## 2017-04-16 NOTE — PROGRESS NOTES
Bedside and Verbal shift change report given to CRISTOBAL Barahona RN (oncoming nurse) by VINCENT Mares RN (offgoing nurse). Report included the following information SBAR, Kardex, Intake/Output and MAR.

## 2017-04-16 NOTE — PROGRESS NOTES
Bedside and Verbal shift change report given to Shaylee Parra RN (oncoming nurse) by Allan Vaughn RN (offgoing nurse). Report included the following information SBAR, Kardex, Intake/Output and MAR.

## 2017-04-17 VITALS
HEART RATE: 63 BPM | TEMPERATURE: 98.2 F | SYSTOLIC BLOOD PRESSURE: 132 MMHG | HEIGHT: 66 IN | OXYGEN SATURATION: 96 % | WEIGHT: 270 LBS | BODY MASS INDEX: 43.39 KG/M2 | DIASTOLIC BLOOD PRESSURE: 63 MMHG | RESPIRATION RATE: 16 BRPM

## 2017-04-17 PROCEDURE — 74011250636 HC RX REV CODE- 250/636: Performed by: OBSTETRICS & GYNECOLOGY

## 2017-04-17 PROCEDURE — 74011250637 HC RX REV CODE- 250/637: Performed by: OBSTETRICS & GYNECOLOGY

## 2017-04-17 PROCEDURE — 90715 TDAP VACCINE 7 YRS/> IM: CPT | Performed by: OBSTETRICS & GYNECOLOGY

## 2017-04-17 RX ORDER — IBUPROFEN 600 MG/1
600 TABLET ORAL
Qty: 30 TAB | Refills: 1 | Status: SHIPPED | OUTPATIENT
Start: 2017-04-17 | End: 2020-07-20

## 2017-04-17 RX ORDER — HYDROCODONE BITARTRATE AND ACETAMINOPHEN 5; 325 MG/1; MG/1
1 TABLET ORAL
Qty: 30 TAB | Refills: 0 | Status: SHIPPED | OUTPATIENT
Start: 2017-04-17 | End: 2020-07-20

## 2017-04-17 RX ADMIN — IBUPROFEN 600 MG: 600 TABLET, FILM COATED ORAL at 09:22

## 2017-04-17 RX ADMIN — IBUPROFEN 600 MG: 600 TABLET, FILM COATED ORAL at 02:05

## 2017-04-17 RX ADMIN — TETANUS TOXOID, REDUCED DIPHTHERIA TOXOID AND ACELLULAR PERTUSSIS VACCINE, ADSORBED 0.5 ML: 5; 2.5; 8; 8; 2.5 SUSPENSION INTRAMUSCULAR at 10:34

## 2017-04-17 RX ADMIN — HYDROCODONE BITARTRATE AND ACETAMINOPHEN 1 TABLET: 5; 325 TABLET ORAL at 03:42

## 2017-04-17 RX ADMIN — SIMETHICONE 80 MG: 80 TABLET, CHEWABLE ORAL at 09:22

## 2017-04-17 NOTE — PROGRESS NOTES
Pt off unit in stable condition via wheelchair with volunteer for discharge home per Dr. Katya Dixon. Pt is to follow-up in 2 week incision check/ 6 week postpartum and is aware. Prescriptions given to pt. Pt denies any HA, Dizziness, N/V or pain at this time. Infant in car seat with mother.

## 2017-04-17 NOTE — DISCHARGE INSTRUCTIONS
Discharge Instructions for  Section    Patient ID:  Charles Baltazar  [id number]  33 y.o.  1985    Take Home Medications         Continue taking your prenatal vitamins if you are breastfeeding. Follow-up Appointment:  Follow-up with [facility] in 2 weeks. Follow-up care is a key part of your treatment and safety. Be sure to make and go to all appointments, and call your doctor if you are having problems. Its also a good idea to know your test results and keep a list of the medicines you take. Activity  Avoid lifting more than 10 lbs for 6 weeks after your delivery. Don't put anything in your vagina for 6 weeks (no intercourse, tampons, or douching). Limit climbing stairs to twice a day, and please hold a railing. Have someone else carry your baby up stairs initially, as you may be a bit unsteady. No driving for about 2 weeks or until your are easily able to turn your body and move your foot from the gas to the brake pedal without pain- you need to be able to move quickly enough to respond to traffic. You cannot drive while you are taking narcotics (prescription pain medications). You may shower but do not take a bath for 2 weeks. Be sure to dry your incision well after your shower. You may gradually work up to light exercise such as brisk walking over the next few weeks, but take it easy- you'll be tired and sore! You need to wait 4-6 weeks before starting vigorous exercise such as aerobics, running, weight-lifting, sit-ups, etc.- clear this with your doctor at your postpartum check-up. Although it's important to limit certain activities and avoid \"over-doing it\", walking is good for you and will actually speed the healing process. Walking increases the blood flow to your legs, decreasing the risk of blood clots, and also encourages the bowels to speed up, decreasing constipation, bloating, and gassiness. Don't stay in bed at home, get up and move around the house.   You'll feel better more quickly. Diet  You may eat a regular diet but you may want to avoid heavy, greasy or spicy foods and other foods that could increase constipation and gas. Wound care  Your incision may have been closed with conventional (metal) staples, absorbable (dissolving) staples, or absorbable sutures. If you still have staples in your incision when you leave the hospital, call your doctor's office as instructed to schedule an appointment to have them removed (usually in about a week). Otherwise, there may be some small tapes over your incision called Steri-strips. Please remove these in about a week. There also may be Dermabond glue over your incision which looks like a clear, shiny coating (as if it were painted with clear fingernail polish). This will gradually peel off over the following weeks, do not remove it. It is normal to have a small amount of clear or reddish drainage from your incision. It's best to leave it open to the air, but if there is drainage, you may cover the incision lightly with gauze, preferably without tape. Keep the incision as dry as possible. You may even consider drying the incision with a cool hair dryer after you shower. Numbness of the skin at or around your incision is normal and the feeling usually returns gradually. Call your doctor if you have a lot of drainage from your incision, an unpleasant odor, red streaks, an increase in pain, or the incision appears to open up. Pain Management  You were probably given a prescription for pain medication, similar to the one you've been taking while in the hospital.  In addition to this, you can take over-the-counter pain medicines like Advil or Motrin (ibuprofen). You can take both medications together, alternating doses every few hours. You will get the most relief if you take the maximum dose:  Motrin or Advil (generic ibuprofen) 800 mg every 8 hours (4 tablets or capsules every 8 hours).   The prescription you were given probably contains a narcotic mixed with Tylenol, therefore, you should not take any extra Tylenol or acetominophen, or you could be getting more than the safe amount. If you feel you don't need the prescription pain medication, you may take Tylenol instead, along with ibuprofen. The maximum dose is Tylenol or acetominophen 1000 mg every 6 hours (equivalent to 2 Extra Strength Tylenols every 6 hours). After a few days, you should reduce the amount of prescription pain medication you are taking. At that point, try to use ibuprofen as the main medication, and take the prescription medication if needed for more severe pain not relieved by the ibuprofen. Your goal should be to take only the minimum necessary amounts of the prescription medication (narcotic), as these pain medicines can be addictive and will worsen or cause constipation. Most patients will find that within a few days, their pain is adequately controlled using only over-the-counter medications and minimal doses of prescription pain medicine. A heating pad can also be very helpful for cramping or back pain. Sitz baths are helpful for pain associated with hemorrhoids. You can use either a sitz bath basin or a bathtub filled with 2-3\" inches of plain warm water. Soak for 10 minutes 3 times a day. Over-the-counter hemorrhoid wipes and ointments are fine to use as well. Constipation  You may find that bowel movements are irregular after delivery and that you have a tendency to be constipated, especially after surgery. A stool softener such as Colace (docusate) can safely be taken daily if needed. If you become constipated you can use a laxative such as Dulcolax, Miralax or Milk of Magnesia. Don't wait until constipation is severe- take something sooner rather than later and you will feel much better! Suppositories such as Dulcolax or Fleet's Enemas are options too.     Your Recovery: What to Expect at Home  Delivering a baby is hard work and you probably aren't getting much sleep, so you will certainly be tired, especially after having a  section. Try to rest when you can and don't worry about doing housework or other tasks which can wait. The soreness in your incision will improve significantly over the first 2 weeks or so, but it may take 6-8 weeks before you are completely recovered. You are likely to have some back pain or general body aches or muscle soreness. This should improve with acetominophen or ibuprofen. If your legs were swollen during your pregnancy or as a result of IV fluids given during your hospital stay, this should go away in a few days to a week. Most women experience some form of the \"Baby Blues\" after having a baby. This means that you may feel emotional, tearful, frustrated, anxious, sad, and irritable some of the time. This is normal if it's not severe and should go away after about 2 weeks. Getting as much rest as you can will help. Accept assistance from friends and family members so that you can take breaks from caring for the baby. Call your doctor if your symptoms seem severe, last more than 2 weeks, or seem to be getting worse instead of better. Get help immediately if you have thoughts of wanting to hurt yourself or others! When should you call for help? Call 911 anytime you think you may need emergency care. For example, call if:  You pass out (lose consciousness). You have sudden chest pain and shortness of breath, or you cough up blood. You have severe pain in your belly. Call your doctor now or seek immediate medical care if:  You have heavy vaginal bleeding that soaks one or more pads in an hour, or you have large clots (golf ball size or larger). Your have foul-smelling discharge from your vagina or incision. You are sick to your stomach or cannot keep fluids down. You have pain that does not get better after you take pain medicine. You have signs of infection, such as:   Increased pain in your abdomen or vaginal area. Red streaks, warmth, or tenderness of your breasts or the area around your incision. A fever of 101 or greater (taken by mouth). You have signs of a blood clot, such as:  Pain in your calf, back of knee, thigh, or groin. Redness and swelling in your leg or groin. You have trouble passing urine or stool, especially if you have pain or swelling in your lower belly; or if you are unable to have a bowel movement after taking a laxative. You have a fast or pounding heartbeat.

## 2017-04-17 NOTE — PROGRESS NOTES
Bedside shift change report given to VINCENT Saucedo RN (oncoming nurse) by CRISTOBAL Barahona RN (offgoing nurse). Report included the following information SBAR and MAR.

## 2017-04-17 NOTE — PROGRESS NOTES
Post-Operative  Day        Patient doing well without significant complaints. Tolerating diet, passing flatus, voiding and ambulating without difficulty    Vitals:  Visit Vitals    /63 (BP 1 Location: Right arm, BP Patient Position: At rest)    Pulse 63    Temp 98.2 °F (36.8 °C)    Resp 16    Ht 5' 6\" (1.676 m)    Wt 122.5 kg (270 lb)    SpO2 96%    Breastfeeding Unknown    BMI 43.58 kg/m2     Temp (24hrs), Av.4 °F (36.9 °C), Min:98 °F (36.7 °C), Max:98.9 °F (37.2 °C)        Exam:     bottle     Patient without distress. Abdomen: soft, expected tenderness, fundus firm                Wound incision clean, dry and intact               Lower extremities are nontender with/without edema. No cords    Labs:   Lab Results   Component Value Date/Time    WBC 10.7 04/15/2017 04:14 AM    WBC 13.1 2017 05:59 PM    WBC 11.1 04/10/2017 05:44 AM    WBC 9.9 2017 03:40 PM    HGB 10.2 04/15/2017 04:14 AM    HGB 11.4 2017 05:59 PM    HGB 10.6 04/10/2017 05:44 AM    HGB 10.8 2017 03:40 PM    HCT 31.0 04/15/2017 04:14 AM    HCT 33.6 2017 05:59 PM    HCT 33.8 04/10/2017 05:44 AM    HCT 33.8 2017 03:40 PM    PLATELET 175  04:14 AM    PLATELET 705  05:59 PM    PLATELET 295  05:44 AM    PLATELET 939  03:40 PM       No results found for this or any previous visit (from the past 24 hour(s)). Assessment: Post-Op day 3, doing well      Plan:   1. Discharge home today  2. Instructions given- pelvic rest, restrictions on driving and lifting, wound care instructions, follow-up  3.  Discharge Medications: see discharge instruction sheet

## 2020-07-13 ENCOUNTER — APPOINTMENT (OUTPATIENT)
Dept: GENERAL RADIOLOGY | Age: 35
End: 2020-07-13
Attending: EMERGENCY MEDICINE
Payer: COMMERCIAL

## 2020-07-13 ENCOUNTER — HOSPITAL ENCOUNTER (EMERGENCY)
Age: 35
Discharge: HOME | End: 2020-07-13
Attending: EMERGENCY MEDICINE | Admitting: EMERGENCY MEDICINE
Payer: COMMERCIAL

## 2020-07-13 VITALS
TEMPERATURE: 98.9 F | HEIGHT: 66 IN | RESPIRATION RATE: 20 BRPM | HEART RATE: 78 BPM | DIASTOLIC BLOOD PRESSURE: 10 MMHG | WEIGHT: 270 LBS | BODY MASS INDEX: 43.39 KG/M2 | OXYGEN SATURATION: 96 % | SYSTOLIC BLOOD PRESSURE: 226 MMHG

## 2020-07-13 DIAGNOSIS — J45.909 REACTIVE AIRWAY DISEASE WITHOUT COMPLICATION, UNSPECIFIED ASTHMA SEVERITY, UNSPECIFIED WHETHER PERSISTENT: Primary | ICD-10-CM

## 2020-07-13 PROCEDURE — 71046 X-RAY EXAM CHEST 2 VIEWS: CPT

## 2020-07-13 PROCEDURE — 74011250637 HC RX REV CODE- 250/637: Performed by: EMERGENCY MEDICINE

## 2020-07-13 PROCEDURE — 99283 EMERGENCY DEPT VISIT LOW MDM: CPT

## 2020-07-13 RX ORDER — LOSARTAN POTASSIUM 25 MG/1
25 TABLET ORAL DAILY
Qty: 20 TAB | Refills: 0 | Status: SHIPPED | OUTPATIENT
Start: 2020-07-13 | End: 2020-07-20

## 2020-07-13 RX ORDER — PREDNISONE 20 MG/1
20 TABLET ORAL DAILY
Qty: 4 TAB | Refills: 0 | Status: SHIPPED | OUTPATIENT
Start: 2020-07-13 | End: 2020-07-17

## 2020-07-13 RX ORDER — DEXAMETHASONE SODIUM PHOSPHATE 4 MG/ML
10 INJECTION, SOLUTION INTRA-ARTICULAR; INTRALESIONAL; INTRAMUSCULAR; INTRAVENOUS; SOFT TISSUE
Status: COMPLETED | OUTPATIENT
Start: 2020-07-13 | End: 2020-07-13

## 2020-07-13 RX ORDER — CETIRIZINE HCL 10 MG
10 TABLET ORAL
Status: COMPLETED | OUTPATIENT
Start: 2020-07-13 | End: 2020-07-13

## 2020-07-13 RX ORDER — LOSARTAN POTASSIUM 50 MG/1
50 TABLET ORAL
Status: COMPLETED | OUTPATIENT
Start: 2020-07-13 | End: 2020-07-13

## 2020-07-13 RX ADMIN — LOSARTAN POTASSIUM 50 MG: 50 TABLET ORAL at 15:52

## 2020-07-13 RX ADMIN — CETIRIZINE HYDROCHLORIDE 10 MG: 10 TABLET, FILM COATED ORAL at 15:52

## 2020-07-13 RX ADMIN — DEXAMETHASONE SODIUM PHOSPHATE 10 MG: 4 INJECTION, SOLUTION INTRAMUSCULAR; INTRAVENOUS at 15:52

## 2020-07-13 NOTE — LETTER
NOTIFICATION RETURN TO WORK / SCHOOL 
 
7/13/2020 3:25 PM 
 
Ms. Ilene Kerr [id number] [place] [id number] To Whom It May Concern: 
 
Ilene Kerr is currently under the care of [facility]PT. She will return to work/school on: 7/16/2020 If there are questions or concerns please have the patient contact our office. Sincerely, Deshaun Loco NP

## 2020-07-13 NOTE — DISCHARGE INSTRUCTIONS
Patient Education     Learning About Asthma Triggers  What are triggers? When you have asthma, certain things can make your symptoms worse. These are called triggers. They include:  · Cigarette smoke or air pollution. · Things you are allergic to, such as:  ¨ Pollen, mold, or dust mites. ¨ Pet hair, skin, or saliva. · Illnesses, like colds, flu, or pneumonia. · Exercise. · Dry, cold air. How do triggers affect asthma? Triggers can make it harder for your lungs to work as they should and can lead to sudden difficulty breathing and other symptoms. When you are around a trigger, an asthma attack is more likely. If your symptoms are severe, you may need emergency treatment or have to go to the hospital for treatment. If you know what your triggers are and can avoid them, you may be able to prevent asthma attacks, reduce how often you have them, and make them less severe. What can you do to avoid triggers? The first thing is to know your triggers. When you are having symptoms, note the things around you that might be causing them. Then look for patterns in what may be triggering your symptoms. Record your triggers on a piece of paper or in an asthma diary. When you have your list of possible triggers, work with your doctor to find ways to avoid them. You also can check how well your lungs are working by measuring your peak expiratory flow (PEF) throughout the day. Your PEF may drop when you are near things that trigger symptoms. Here are some ways to avoid a few common triggers. · Do not smoke or allow others to smoke around you. If you need help quitting, talk to your doctor about stop-smoking programs and medicines. These can increase your chances of quitting for good. · If there is a lot of pollution, pollen, or dust outside, stay at home and keep your windows closed. Use an air conditioner or air filter in your home.  Check your local weather report or newspaper for air quality and pollen reports. · Get a flu shot every year. Talk to your doctor about getting a pneumococcal shot. Wash your hands often to prevent infections. · Avoid exercising outdoors in cold weather. If you are outdoors in cold weather, wear a scarf around your face and breathe through your nose. How can you manage an asthma attack? · If you have an asthma action plan, follow the plan. In general:  ¨ Use your quick-relief inhaler as directed by your doctor. If your symptoms do not get better after you use your medicine, have someone take you to the emergency room. Call an ambulance if needed. ¨ If your doctor has given you other inhaled medicines or steroid pills, take them as directed. Where can you learn more? Go to FlexScore.be  Enter M564 in the search box to learn more about \"Learning About Asthma Triggers. \"   © 4456-8246 Healthwise, Incorporated. Care instructions adapted under license by [facility] [employer] information [employer] (which disclaims liability or warranty for this information). This care instruction is for use with your licensed healthcare professional. If you have questions about a medical condition or this instruction, always ask your healthcare professional. [address] any warranty or liability for your use of this information. Content Version: 10.6.183344; Last Revised: February 23, 2012                 Patient Education        Asthma: Your Action Plan  Sample Action Plan     Controller medicine action plan  Fill in the blank spaces and boxes that apply for all sections. · Name of your controller medicine:  ? ____________________________________________  · How much of this medicine do you take? ? ____________________________________________  · How often do you take this medicine? ? ____________________________________________  · Other instructions?   ? ____________________________________________  Quick-relief medicine action plan  · Name of your quick-relief medicine:  ? ____________________________________________  · How much of this medicine do you take? ? ____________________________________________  · How often do you take this medicine? ? ____________________________________________  Asthma Zones  GREEN ZONE: This is where you want to be! Green zone symptoms   · You have no shortness of breath or chest tightness. You are not coughing or wheezing. · You can do all of your usual activities. · You sleep well at night. Green zone peak flow (if you use a peak flow meter)  · ______ or more (80% or more of your personal best)  Green zone actions (Check the boxes and fill in the blank spaces that apply.)  [ ] You take your controller medicine(s) every day. [ ] [employer] So are staying away from your asthma triggers. [ ] You take quick-relief medicine (called _____________________) ______ minutes before exercise. YELLOW ZONE: Your asthma is getting worse. Yellow zone symptoms   · You are short of breath or have chest tightness. You are coughing or wheezing. · You have symptoms that keep you up at night. · You can do some, but not all, of your usual activities. Yellow zone peak flow (if you use a peak flow meter)  · ______ to ______ (50% to 79% of your personal best)  Yellow zone actions (Check the boxes and fill in the blank spaces that apply.)  [ ] Take _____ puff(s) of quick-relief medicine called ______________________. Repeat _____ times. [ ] If your symptoms don't get better or your peak flow has not returned to the green zone in 1 hour, then:  · [ ] Take _____ puff(s) of medicine called ______________________. Take it ____ times a day. · [ ] Begin or increase treatment with corticosteroid pills. Take ______ mg of medicine called ____________________________ every __________. · [ ] Call your doctor at this number: ____________________. RED ZONE: Danger! Red zone symptoms   · You are very short of breath.   · You can't do your usual activities. · Quick-relief medicine doesn't help. Or your symptoms don't get better after 24 hours in the yellow zone. Red zone peak flow (if you use a peak flow meter)  · Less than _______ (less than 50% of your personal best)  Red zone actions (Check the boxes and fill in the blank spaces that apply.)  [ ] Take _____ puff(s) of quick-relief medicine called ____________________________. Repeat ______ times. [ ] Begin or increase treatment with corticosteroid pills. Take ________ mg now. [ ] Call your doctor at this number: _________________. If you can't contact your doctor, go to the emergency department. [id number] or ___________________. [ ] Other numbers you might call are: ___________________________________. When should you call for help? [id number] anytime you think you may need emergency care. For example, call if:  · You have severe trouble breathing. Call your doctor now or seek immediate medical care if:  · You are in the red zone of your asthma action plan. · You've used your quick-relief medicine but are still having trouble breathing. · You cough up blood. · You have new or worse trouble breathing. · You cough up dark brown or bloody mucus (sputum). Watch closely for changes in your health, and be sure to contact your doctor if:  · You need to use quick-relief medicine more than 2 days each week (unless it's just for exercise). · Your coughing and wheezing get worse. Follow-up care is a key part of your treatment and safety. Be sure to make and go to all appointments, and call your doctor if you are having problems. It's also a good idea to know your test results and keep a list of the medicines you take. Where can you learn more? Go to http://www.gray.com/  Enter H178 in the search box to learn more about \"Asthma: Your Action Plan. \"  Current as of: February 24, 2020               Content Version: 12.5  © 2006-2020 Healthwise, Incorporated.    Care instructions adapted under license by [employer] (which disclaims liability or warranty for this information). If you have questions about a medical condition or this instruction, always ask your healthcare professional. [address] any warranty or liability for your use of this information. Patient Education        High Blood Pressure: Care Instructions  Overview     It's normal for blood pressure to go up and down throughout the day. But if it stays up, you have high blood pressure. Another name for high blood pressure is hypertension. Despite what a lot of people think, high blood pressure usually doesn't cause headaches or make you feel dizzy or lightheaded. It usually has no symptoms. But it does increase your risk of stroke, heart attack, and other problems. You and your doctor will talk about your risks of these problems based on your blood pressure. Your doctor will give you a goal for your blood pressure. Your goal will be based on your health and your age. Lifestyle changes, such as eating healthy and being active, are always important to help lower blood pressure. You might also take medicine to reach your blood pressure goal.  Follow-up care is a key part of your treatment and safety. Be sure to make and go to all appointments, and call your doctor if you are having problems. It's also a good idea to know your test results and keep a list of the medicines you take. How can you care for yourself at home? Medical treatment  · If you stop taking your medicine, your blood pressure will go back up. You may take one or more types of medicine to lower your blood pressure. Be safe with medicines. Take your medicine exactly as prescribed. Call your doctor if you think you are having a problem with your medicine. · Talk to your doctor before you start taking aspirin every day. Aspirin can help certain people lower their risk of a heart attack or stroke.  But taking aspirin isn't right for everyone, because it can cause serious bleeding. · See your doctor regularly. You may need to see the doctor more often at first or until your blood pressure comes down. · If you are taking blood pressure medicine, talk to your doctor before you take decongestants or anti-inflammatory medicine, such as ibuprofen. Some of these medicines can raise blood pressure. · Learn how to check your blood pressure at home. Lifestyle changes  · Stay at a healthy weight. This is especially important if you put on weight around the waist. Losing even 10 pounds can help you lower your blood pressure. · If your doctor recommends it, get more exercise. Walking is a good choice. Bit by bit, increase the amount you walk every day. Try for at least 30 minutes on most days of the week. You also may want to swim, bike, or do other activities. · Avoid or limit alcohol. Talk to your doctor about whether you can drink any alcohol. · Try to limit how much sodium you eat to less than 2,300 milligrams (mg) a day. Your doctor may ask you to try to eat less than 1,500 mg a day. · Eat plenty of fruits (such as bananas and oranges), vegetables, legumes, whole grains, and low-fat dairy products. · Lower the amount of saturated fat in your diet. Saturated fat is found in animal products such as milk, cheese, and meat. Limiting these foods may help you lose weight and also lower your risk for heart disease. · Do not smoke. Smoking increases your risk for heart attack and stroke. If you need help quitting, talk to your doctor about stop-smoking programs and medicines. These can increase your chances of quitting for good. When should you call for help? Call  911 anytime you think you may need emergency care. This may mean having symptoms that suggest that your blood pressure is causing a serious heart or blood vessel problem. Your blood pressure may be over 180/120.   For example, call 911 if:  · You have symptoms of a heart attack. These may include:  ? Chest pain or pressure, or a strange feeling in the chest.  ? Sweating. ? Shortness of breath. ? Nausea or vomiting. ? Pain, pressure, or a strange feeling in the back, neck, jaw, or upper belly or in one or both shoulders or arms. ? Lightheadedness or sudden weakness. ? A fast or irregular heartbeat. · You have symptoms of a stroke. These may include:  ? Sudden numbness, tingling, weakness, or loss of movement in your face, arm, or leg, especially on only one side of your body. ? Sudden vision changes. ? Sudden trouble speaking. ? Sudden confusion or trouble understanding simple statements. ? Sudden problems with walking or balance. ? A sudden, severe headache that is different from past headaches. · You have severe back or belly pain. Do not wait until your blood pressure comes down on its own. Get help right away. Call your doctor now or seek immediate care if:  · Your blood pressure is much higher than normal (such as 180/120 or higher), but you don't have symptoms. · You think high blood pressure is causing symptoms, such as:  ? Severe headache.  ? Blurry vision. Watch closely for changes in your health, and be sure to contact your doctor if:  · Your blood pressure measures higher than your doctor recommends at least 2 times. That means the top number is higher or the bottom number is higher, or both. · You think you may be having side effects from your blood pressure medicine. Where can you learn more? Go to http://adrianne-soheila.info/  Enter [id number] in the search box to learn more about \"High Blood Pressure: Care Instructions. \"  Current as of: December 16, 2019               Content Version: 12.5  © 5267-2428 Healthwise, Incorporated. Care instructions adapted under license by [employer] (which disclaims liability or warranty for this information).  If you have questions about a medical condition or this instruction, always ask your healthcare professional. [address] any warranty or liability for your use of this information.

## 2020-07-13 NOTE — ED TRIAGE NOTES
Pt arrives via POV w/ c/c of asthma attack. Pt reports this attack worsened this morning. She was started on a steroid dose pack/Diclofenac last Friday. Pt reports that she used her rescue inhaler & Symbicort this AM w/out relief.

## 2020-07-13 NOTE — ED NOTES
Discharge instructions reviewed by provider and signed by patient and RN. Patient discharged ambulatory verbalizing appropriate followup.

## 2020-07-13 NOTE — ED PROVIDER NOTES
HPI patient is a 69-year-old female with past medical history significant for asthma, gestational diabetes, hypertension, and obesity who presents to the ED with persistent dry cough and intermittent wheezing. Denies fever, cold symptoms,headache, neck pain, visual changes, focal weakness or rash. Denies any  difficulty breathing, difficulty swallowing, SOB or chest pain. Denies any nausea, vomiting or diarrhea. Pt. Reports that she has been out of her blood pressure medicine for over a month; her PCP will not refill until she has an in person evaluation. She had a coughing fit at work today and was referred to the ED for evaluation. She is presently on a Medrol Dosepak for an ankle injury and used her albuterol metered-dose inhaler without relief. Patient drove herself here. Past Medical History:   Diagnosis Date    Asthma     Diabetes ([facility] [address]     gestational diabetes    Essential hypertension     seen today for elevated blood pressure    Staph aureus infection 2006    over abdomen       Past Surgical History:   Procedure Laterality Date    BREAST SURGERY PROCEDURE UNLISTED      HX BREAST REDUCTION      HX CHOLECYSTECTOMY      HX TONSILLECTOMY           No family history on file.     Social History     Socioeconomic History    Marital status: SINGLE     Spouse name: Not on file    Number of children: Not on file    Years of education: Not on file    Highest education level: Not on file   Occupational History    Not on file   Social Needs    Financial resource strain: Not on file    Food insecurity     Worry: Not on file     Inability: Not on file    Transportation needs     Medical: Not on file     Non-medical: Not on file   Tobacco Use    Smoking status: Never Smoker   Substance and Sexual Activity    Alcohol use: No    Drug use: No    Sexual activity: Yes     Partners: Male   Lifestyle    Physical activity     Days per week: Not on file     Minutes per session: Not on file    Stress: Not on file   Relationships    Social connections     Talks on phone: Not on file     Gets together: Not on file     Attends Zoroastrian service: Not on file     Active member of club or organization: Not on file     Attends meetings of clubs or organizations: Not on file     Relationship status: Not on file    Intimate partner violence     Fear of current or ex partner: Not on file     Emotionally abused: Not on file     Physically abused: Not on file     Forced sexual activity: Not on file   Other Topics Concern    Not on file   Social History Narrative    Not on file         ALLERGIES: Biaxin [clarithromycin]; Chlorzoxazone; Keflex [cephalexin]; and Penicillins    Review of Systems   Constitutional: Negative for activity change, appetite change, fever and unexpected weight change. HENT: Negative for congestion, facial swelling and trouble swallowing. Respiratory: Positive for cough and wheezing. Negative for chest tightness and shortness of breath. Cardiovascular: Negative for chest pain, palpitations and leg swelling. Gastrointestinal: Negative for abdominal pain, diarrhea, nausea and vomiting. Genitourinary: Negative for difficulty urinating and dysuria. Musculoskeletal: Negative for arthralgias and myalgias. Skin: Negative for rash. Neurological: Negative for dizziness, light-headedness and headaches. All other systems reviewed and are negative. Vitals:    07/13/20 1441   BP: (!) 233/113   Pulse: 83   Resp: 22   Temp: 98.9 °F (37.2 °C)   SpO2: 99%   Weight: 122.5 kg (270 lb)   Height: 5' 6\" (1.676 m)            Physical Exam  Vitals signs reviewed. Constitutional:       General: She is not in acute distress. Appearance: She is obese. She is not ill-appearing, toxic-appearing or diaphoretic. 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       Right Ear: Tympanic membrane normal.      Left Ear: Tympanic membrane normal. Nose: Nose normal.      Mouth/Throat:      Mouth: Mucous membranes are moist.      Pharynx: No posterior oropharyngeal erythema. Neck:      Musculoskeletal: Normal range of motion and neck supple. Cardiovascular:      Rate and Rhythm: Normal rate and regular rhythm. Pulmonary:      Effort: Pulmonary effort is normal.      Breath sounds: Normal breath sounds. Chest:      Chest wall: No tenderness. Abdominal:      Palpations: Abdomen is soft. Tenderness: There is no abdominal tenderness. There is no guarding or rebound. Musculoskeletal: Normal range of motion. Lymphadenopathy:      Cervical: No cervical adenopathy. Skin:     General: Skin is warm and dry. Findings: No rash. Neurological:      Mental Status: She is alert and oriented to person, place, and time. Psychiatric:         Mood and Affect: Mood normal.          MDM       Procedures      Plan to write for losartan for 3 weeks applied to get her into her PCP for her refills. She is going to continue with her previously prescribed asthma meds at home. Chest x-ray is normal recommend close follow-up with PCP.  3:37 PM  Pt has been re-examined; states that she is  feeling better and have no new complaints. On auscultation, wheezing is significantly resoloved. Medications, x-rays, diagnosis, follow up plan and return instructions have been reviewed and discussed with the patient. Pt has had the opportunity to ask questions about her care. Patient expresses understanding and agreement with care plan, including oral steroids, nebulizer or inhaler use, follow up and return instructions. Patient  agrees to return in 24 hours if her symptoms are not improving or immediately if she has any change in her condition including worsening wheezing or any signs of increasing work of breathing. Sandy Ascencio NP

## 2020-07-14 ENCOUNTER — PATIENT OUTREACH (OUTPATIENT)
Dept: CASE MANAGEMENT | Age: 35
End: 2020-07-14

## 2020-07-15 NOTE — PROGRESS NOTES
07/15/20 Attempted patient outreach for COVID follow up call per protocol. Unable to contact patient, is active on Reachable, message sent, no return outreach. Resolving TEOFILO episode.  SHARONM

## 2020-07-19 ENCOUNTER — APPOINTMENT (OUTPATIENT)
Dept: ULTRASOUND IMAGING | Age: 35
End: 2020-07-19
Attending: INTERNAL MEDICINE
Payer: COMMERCIAL

## 2020-07-19 ENCOUNTER — APPOINTMENT (OUTPATIENT)
Dept: GENERAL RADIOLOGY | Age: 35
End: 2020-07-19
Attending: FAMILY MEDICINE
Payer: COMMERCIAL

## 2020-07-19 ENCOUNTER — APPOINTMENT (OUTPATIENT)
Dept: CT IMAGING | Age: 35
End: 2020-07-19
Attending: EMERGENCY MEDICINE
Payer: COMMERCIAL

## 2020-07-19 ENCOUNTER — APPOINTMENT (OUTPATIENT)
Dept: GENERAL RADIOLOGY | Age: 35
End: 2020-07-19
Attending: INTERNAL MEDICINE
Payer: COMMERCIAL

## 2020-07-19 ENCOUNTER — HOSPITAL ENCOUNTER (OUTPATIENT)
Age: 35
Setting detail: OBSERVATION
Discharge: HOME | End: 2020-07-20
Attending: EMERGENCY MEDICINE | Admitting: FAMILY MEDICINE
Payer: COMMERCIAL

## 2020-07-19 ENCOUNTER — APPOINTMENT (OUTPATIENT)
Dept: MRI IMAGING | Age: 35
End: 2020-07-19
Attending: INTERNAL MEDICINE
Payer: COMMERCIAL

## 2020-07-19 ENCOUNTER — APPOINTMENT (OUTPATIENT)
Dept: NON INVASIVE DIAGNOSTICS | Age: 35
End: 2020-07-19
Attending: INTERNAL MEDICINE
Payer: COMMERCIAL

## 2020-07-19 DIAGNOSIS — R51.9 ACUTE INTRACTABLE HEADACHE, UNSPECIFIED HEADACHE TYPE: ICD-10-CM

## 2020-07-19 DIAGNOSIS — N28.9 RENAL INSUFFICIENCY: ICD-10-CM

## 2020-07-19 DIAGNOSIS — I16.0 HYPERTENSIVE URGENCY: Primary | ICD-10-CM

## 2020-07-19 DIAGNOSIS — R51.9 NONINTRACTABLE EPISODIC HEADACHE, UNSPECIFIED HEADACHE TYPE: ICD-10-CM

## 2020-07-19 DIAGNOSIS — R11.2 NAUSEA AND VOMITING, INTRACTABILITY OF VOMITING NOT SPECIFIED, UNSPECIFIED VOMITING TYPE: ICD-10-CM

## 2020-07-19 DIAGNOSIS — D72.829 LEUKOCYTOSIS, UNSPECIFIED TYPE: ICD-10-CM

## 2020-07-19 PROBLEM — I16.1 HYPERTENSIVE EMERGENCY: Status: ACTIVE | Noted: 2020-07-19

## 2020-07-19 LAB
ALBUMIN SERPL-MCNC: 3.4 G/DL (ref 3.5–5)
ALBUMIN/GLOB SERPL: 0.9 {RATIO} (ref 1.1–2.2)
ALP SERPL-CCNC: 94 U/L (ref 45–117)
ALT SERPL-CCNC: 60 U/L (ref 12–78)
ANION GAP SERPL CALC-SCNC: 6 MMOL/L (ref 5–15)
APPEARANCE UR: CLEAR
AST SERPL-CCNC: 28 U/L (ref 15–37)
ATRIAL RATE: 69 BPM
ATRIAL RATE: 70 BPM
BACTERIA URNS QL MICRO: NEGATIVE /HPF
BASOPHILS # BLD: 0 K/UL (ref 0–0.1)
BASOPHILS NFR BLD: 0 % (ref 0–1)
BILIRUB SERPL-MCNC: 0.3 MG/DL (ref 0.2–1)
BILIRUB UR QL: NEGATIVE
BUN SERPL-MCNC: 34 MG/DL (ref 6–20)
BUN/CREAT SERPL: 16 (ref 12–20)
CALCIUM SERPL-MCNC: 8.5 MG/DL (ref 8.5–10.1)
CALCULATED P AXIS, ECG09: 45 DEGREES
CALCULATED P AXIS, ECG09: 49 DEGREES
CALCULATED R AXIS, ECG10: -7 DEGREES
CALCULATED R AXIS, ECG10: -7 DEGREES
CALCULATED T AXIS, ECG11: 2 DEGREES
CALCULATED T AXIS, ECG11: 6 DEGREES
CHLORIDE SERPL-SCNC: 104 MMOL/L (ref 97–108)
CO2 SERPL-SCNC: 28 MMOL/L (ref 21–32)
COLOR UR: ABNORMAL
COMMENT, HOLDF: NORMAL
COMMENT, HOLDF: NORMAL
CREAT SERPL-MCNC: 2.08 MG/DL (ref 0.55–1.02)
CREAT UR-MCNC: 49 MG/DL
DIAGNOSIS, 93000: NORMAL
DIAGNOSIS, 93000: NORMAL
DIFFERENTIAL METHOD BLD: ABNORMAL
ECHO AO ASC DIAM: 3.08 CM
ECHO AO ROOT DIAM: 2.96 CM
ECHO AR MAX VEL PISA: 476.81 CENTIMETER/SECOND
ECHO AV AREA PEAK VELOCITY: 2.07 CM2
ECHO AV AREA VTI: 2.18 CM2
ECHO AV AREA/BSA PEAK VELOCITY: 0.9 CM2/M2
ECHO AV AREA/BSA VTI: 0.9 CM2/M2
ECHO AV MEAN GRADIENT: 7.57 MMHG
ECHO AV PEAK GRADIENT: 15.37 MMHG
ECHO AV PEAK VELOCITY: 196.04 CM/S
ECHO AV REGURGITANT PHT: 1.06 S
ECHO AV VTI: 40.16 CM
ECHO LA AREA 4C: 19.26 CM2
ECHO LA MAJOR AXIS: 3.88 CM
ECHO LA MINOR AXIS: 1.68 CM
ECHO LA VOL 2C: 74.18 ML (ref 22–52)
ECHO LA VOL 4C: 52.59 ML (ref 22–52)
ECHO LA VOL BP: 69.97 ML (ref 22–52)
ECHO LA VOL/BSA BIPLANE: 30.27 ML/M2 (ref 16–28)
ECHO LA VOLUME INDEX A2C: 32.09 ML/M2 (ref 16–28)
ECHO LA VOLUME INDEX A4C: 22.75 ML/M2 (ref 16–28)
ECHO LV E' LATERAL VELOCITY: 9.74 CENTIMETER/SECOND
ECHO LV E' SEPTAL VELOCITY: 6.95 CENTIMETER/SECOND
ECHO LV INTERNAL DIMENSION DIASTOLIC: 4.98 CM (ref 3.9–5.3)
ECHO LV INTERNAL DIMENSION SYSTOLIC: 3.48 CM
ECHO LV IVSD: 1.15 CM (ref 0.6–0.9)
ECHO LV MASS 2D: 225.5 G (ref 67–162)
ECHO LV MASS INDEX 2D: 97.6 G/M2 (ref 43–95)
ECHO LV POSTERIOR WALL DIASTOLIC: 1.2 CM (ref 0.6–0.9)
ECHO LVOT DIAM: 1.99 CM
ECHO LVOT PEAK GRADIENT: 6.85 MMHG
ECHO LVOT PEAK VELOCITY: 130.87 CM/S
ECHO LVOT SV: 87.4 ML
ECHO LVOT VTI: 28.23 CM
ECHO MV A VELOCITY: 80.58 CENTIMETER/SECOND
ECHO MV AREA PHT: 2.22 CM2
ECHO MV E DECELERATION TIME (DT): 0.34 S
ECHO MV E VELOCITY: 82.52 CENTIMETER/SECOND
ECHO MV PRESSURE HALF TIME (PHT): 0.1 S
ECHO RA AREA 4C: 14.6 CM2
ECHO RV INTERNAL DIMENSION: 3.41 CM
ECHO RV TAPSE: 1.95 CM (ref 1.5–2)
ECHO TV REGURGITANT MAX VELOCITY: 273.09 CM/S
ECHO TV REGURGITANT PEAK GRADIENT: 29.83 MMHG
EOSINOPHIL # BLD: 0.2 K/UL (ref 0–0.4)
EOSINOPHIL NFR BLD: 1 % (ref 0–7)
EPITH CASTS URNS QL MICRO: ABNORMAL /LPF
ERYTHROCYTE [DISTWIDTH] IN BLOOD BY AUTOMATED COUNT: 13.7 % (ref 11.5–14.5)
GLOBULIN SER CALC-MCNC: 3.9 G/DL (ref 2–4)
GLUCOSE BLD STRIP.AUTO-MCNC: 185 MG/DL (ref 65–100)
GLUCOSE BLD STRIP.AUTO-MCNC: 195 MG/DL (ref 65–100)
GLUCOSE BLD STRIP.AUTO-MCNC: 239 MG/DL (ref 65–100)
GLUCOSE BLD STRIP.AUTO-MCNC: 249 MG/DL (ref 65–100)
GLUCOSE SERPL-MCNC: 222 MG/DL (ref 65–100)
GLUCOSE UR STRIP.AUTO-MCNC: 250 MG/DL
HCT VFR BLD AUTO: 37.3 % (ref 35–47)
HGB BLD-MCNC: 12.1 G/DL (ref 11.5–16)
HGB UR QL STRIP: NEGATIVE
HYALINE CASTS URNS QL MICRO: ABNORMAL /LPF (ref 0–5)
IMM GRANULOCYTES # BLD AUTO: 0 K/UL
IMM GRANULOCYTES NFR BLD AUTO: 0 %
KETONES UR QL STRIP.AUTO: NEGATIVE MG/DL
LA VOL DISK BP: 66.37 ML (ref 22–52)
LEUKOCYTE ESTERASE UR QL STRIP.AUTO: NEGATIVE
LVOT MG: 3.59 MMHG
LYMPHOCYTES # BLD: 8.9 K/UL (ref 0.8–3.5)
LYMPHOCYTES NFR BLD: 50 % (ref 12–49)
MCH RBC QN AUTO: 27.5 PG (ref 26–34)
MCHC RBC AUTO-ENTMCNC: 32.4 G/DL (ref 30–36.5)
MCV RBC AUTO: 84.8 FL (ref 80–99)
MONOCYTES # BLD: 0.2 K/UL (ref 0–1)
MONOCYTES NFR BLD: 1 % (ref 5–13)
NEUTS BAND NFR BLD MANUAL: 2 % (ref 0–6)
NEUTS SEG # BLD: 8.5 K/UL (ref 1.8–8)
NEUTS SEG NFR BLD: 46 % (ref 32–75)
NITRITE UR QL STRIP.AUTO: NEGATIVE
NRBC # BLD: 0 K/UL (ref 0–0.01)
NRBC BLD-RTO: 0 PER 100 WBC
P-R INTERVAL, ECG05: 180 MS
P-R INTERVAL, ECG05: 182 MS
PH UR STRIP: 6 [PH] (ref 5–8)
PLATELET # BLD AUTO: 307 K/UL (ref 150–400)
PMV BLD AUTO: 11.1 FL (ref 8.9–12.9)
POTASSIUM SERPL-SCNC: 3.6 MMOL/L (ref 3.5–5.1)
PROT SERPL-MCNC: 7.3 G/DL (ref 6.4–8.2)
PROT UR STRIP-MCNC: 30 MG/DL
Q-T INTERVAL, ECG07: 428 MS
Q-T INTERVAL, ECG07: 454 MS
QRS DURATION, ECG06: 86 MS
QRS DURATION, ECG06: 90 MS
QTC CALCULATION (BEZET), ECG08: 462 MS
QTC CALCULATION (BEZET), ECG08: 486 MS
RBC # BLD AUTO: 4.4 M/UL (ref 3.8–5.2)
RBC #/AREA URNS HPF: ABNORMAL /HPF (ref 0–5)
RBC MORPH BLD: ABNORMAL
SAMPLES BEING HELD,HOLD: NORMAL
SAMPLES BEING HELD,HOLD: NORMAL
SERVICE CMNT-IMP: ABNORMAL
SODIUM SERPL-SCNC: 138 MMOL/L (ref 136–145)
SODIUM UR-SCNC: 118 MMOL/L
SP GR UR REFRACTOMETRY: 1.01 (ref 1–1.03)
TROPONIN I SERPL-MCNC: <0.05 NG/ML
UA: UC IF INDICATED,UAUC: ABNORMAL
UROBILINOGEN UR QL STRIP.AUTO: 0.2 EU/DL (ref 0.2–1)
VENTRICULAR RATE, ECG03: 69 BPM
VENTRICULAR RATE, ECG03: 70 BPM
WBC # BLD AUTO: 17.8 K/UL (ref 3.6–11)
WBC URNS QL MICRO: ABNORMAL /HPF (ref 0–4)

## 2020-07-19 PROCEDURE — 81001 URINALYSIS AUTO W/SCOPE: CPT

## 2020-07-19 PROCEDURE — 99218 HC RM OBSERVATION: CPT

## 2020-07-19 PROCEDURE — 74011250637 HC RX REV CODE- 250/637: Performed by: INTERNAL MEDICINE

## 2020-07-19 PROCEDURE — 76770 US EXAM ABDO BACK WALL COMP: CPT

## 2020-07-19 PROCEDURE — 74011250637 HC RX REV CODE- 250/637: Performed by: EMERGENCY MEDICINE

## 2020-07-19 PROCEDURE — 96372 THER/PROPH/DIAG INJ SC/IM: CPT

## 2020-07-19 PROCEDURE — 74011250636 HC RX REV CODE- 250/636: Performed by: FAMILY MEDICINE

## 2020-07-19 PROCEDURE — 65660000000 HC RM CCU STEPDOWN

## 2020-07-19 PROCEDURE — 76775 US EXAM ABDO BACK WALL LIM: CPT

## 2020-07-19 PROCEDURE — 74011636637 HC RX REV CODE- 636/637: Performed by: INTERNAL MEDICINE

## 2020-07-19 PROCEDURE — 96376 TX/PRO/DX INJ SAME DRUG ADON: CPT

## 2020-07-19 PROCEDURE — 74018 RADEX ABDOMEN 1 VIEW: CPT

## 2020-07-19 PROCEDURE — 84300 ASSAY OF URINE SODIUM: CPT

## 2020-07-19 PROCEDURE — 85025 COMPLETE CBC W/AUTO DIFF WBC: CPT

## 2020-07-19 PROCEDURE — 80053 COMPREHEN METABOLIC PANEL: CPT

## 2020-07-19 PROCEDURE — 70450 CT HEAD/BRAIN W/O DYE: CPT

## 2020-07-19 PROCEDURE — 71045 X-RAY EXAM CHEST 1 VIEW: CPT

## 2020-07-19 PROCEDURE — 93005 ELECTROCARDIOGRAM TRACING: CPT

## 2020-07-19 PROCEDURE — 96375 TX/PRO/DX INJ NEW DRUG ADDON: CPT

## 2020-07-19 PROCEDURE — 74011250636 HC RX REV CODE- 250/636: Performed by: INTERNAL MEDICINE

## 2020-07-19 PROCEDURE — 74011250636 HC RX REV CODE- 250/636: Performed by: EMERGENCY MEDICINE

## 2020-07-19 PROCEDURE — 84484 ASSAY OF TROPONIN QUANT: CPT

## 2020-07-19 PROCEDURE — 96374 THER/PROPH/DIAG INJ IV PUSH: CPT

## 2020-07-19 PROCEDURE — 99285 EMERGENCY DEPT VISIT HI MDM: CPT

## 2020-07-19 PROCEDURE — 70551 MRI BRAIN STEM W/O DYE: CPT

## 2020-07-19 PROCEDURE — 74011250637 HC RX REV CODE- 250/637: Performed by: FAMILY MEDICINE

## 2020-07-19 PROCEDURE — 93306 TTE W/DOPPLER COMPLETE: CPT

## 2020-07-19 PROCEDURE — 82570 ASSAY OF URINE CREATININE: CPT

## 2020-07-19 PROCEDURE — 82962 GLUCOSE BLOOD TEST: CPT

## 2020-07-19 PROCEDURE — 36415 COLL VENOUS BLD VENIPUNCTURE: CPT

## 2020-07-19 RX ORDER — ONDANSETRON 4 MG/1
4 TABLET, ORALLY DISINTEGRATING ORAL
Status: DISCONTINUED | OUTPATIENT
Start: 2020-07-19 | End: 2020-07-20 | Stop reason: HOSPADM

## 2020-07-19 RX ORDER — NALOXONE HYDROCHLORIDE 0.4 MG/ML
0.4 INJECTION, SOLUTION INTRAMUSCULAR; INTRAVENOUS; SUBCUTANEOUS AS NEEDED
Status: DISCONTINUED | OUTPATIENT
Start: 2020-07-19 | End: 2020-07-20 | Stop reason: HOSPADM

## 2020-07-19 RX ORDER — LABETALOL HCL 20 MG/4 ML
20 SYRINGE (ML) INTRAVENOUS ONCE
Status: COMPLETED | OUTPATIENT
Start: 2020-07-19 | End: 2020-07-19

## 2020-07-19 RX ORDER — DEXTROSE 50 % IN WATER (D50W) INTRAVENOUS SYRINGE
12.5-25 AS NEEDED
Status: DISCONTINUED | OUTPATIENT
Start: 2020-07-19 | End: 2020-07-20 | Stop reason: HOSPADM

## 2020-07-19 RX ORDER — MAGNESIUM SULFATE 100 %
4 CRYSTALS MISCELLANEOUS AS NEEDED
Status: DISCONTINUED | OUTPATIENT
Start: 2020-07-19 | End: 2020-07-20 | Stop reason: HOSPADM

## 2020-07-19 RX ORDER — AMLODIPINE BESYLATE 5 MG/1
5 TABLET ORAL DAILY
Status: DISCONTINUED | OUTPATIENT
Start: 2020-07-19 | End: 2020-07-19

## 2020-07-19 RX ORDER — SODIUM CHLORIDE 0.9 % (FLUSH) 0.9 %
5-40 SYRINGE (ML) INJECTION EVERY 8 HOURS
Status: DISCONTINUED | OUTPATIENT
Start: 2020-07-19 | End: 2020-07-20 | Stop reason: HOSPADM

## 2020-07-19 RX ORDER — ACETAMINOPHEN 500 MG
1000 TABLET ORAL
Status: COMPLETED | OUTPATIENT
Start: 2020-07-19 | End: 2020-07-19

## 2020-07-19 RX ORDER — HYDRALAZINE HYDROCHLORIDE 20 MG/ML
10 INJECTION INTRAMUSCULAR; INTRAVENOUS
Status: COMPLETED | OUTPATIENT
Start: 2020-07-19 | End: 2020-07-19

## 2020-07-19 RX ORDER — ACETAMINOPHEN 325 MG/1
650 TABLET ORAL
Status: DISCONTINUED | OUTPATIENT
Start: 2020-07-19 | End: 2020-07-20 | Stop reason: HOSPADM

## 2020-07-19 RX ORDER — HEPARIN SODIUM 5000 [USP'U]/ML
5000 INJECTION, SOLUTION INTRAVENOUS; SUBCUTANEOUS EVERY 8 HOURS
Status: DISCONTINUED | OUTPATIENT
Start: 2020-07-19 | End: 2020-07-20 | Stop reason: HOSPADM

## 2020-07-19 RX ORDER — INSULIN LISPRO 100 [IU]/ML
INJECTION, SOLUTION INTRAVENOUS; SUBCUTANEOUS
Status: DISCONTINUED | OUTPATIENT
Start: 2020-07-19 | End: 2020-07-20 | Stop reason: HOSPADM

## 2020-07-19 RX ORDER — AMLODIPINE BESYLATE 5 MG/1
5 TABLET ORAL DAILY
Status: DISCONTINUED | OUTPATIENT
Start: 2020-07-19 | End: 2020-07-20

## 2020-07-19 RX ORDER — LABETALOL HCL 20 MG/4 ML
20 SYRINGE (ML) INTRAVENOUS
Status: DISCONTINUED | OUTPATIENT
Start: 2020-07-19 | End: 2020-07-20 | Stop reason: HOSPADM

## 2020-07-19 RX ORDER — LOSARTAN POTASSIUM 25 MG/1
25 TABLET ORAL DAILY
Status: DISCONTINUED | OUTPATIENT
Start: 2020-07-19 | End: 2020-07-19

## 2020-07-19 RX ORDER — SODIUM CHLORIDE 0.9 % (FLUSH) 0.9 %
5-40 SYRINGE (ML) INJECTION AS NEEDED
Status: DISCONTINUED | OUTPATIENT
Start: 2020-07-19 | End: 2020-07-20 | Stop reason: HOSPADM

## 2020-07-19 RX ORDER — ONDANSETRON 2 MG/ML
4 INJECTION INTRAMUSCULAR; INTRAVENOUS
Status: COMPLETED | OUTPATIENT
Start: 2020-07-19 | End: 2020-07-19

## 2020-07-19 RX ADMIN — Medication 10 ML: at 22:42

## 2020-07-19 RX ADMIN — Medication 10 ML: at 17:04

## 2020-07-19 RX ADMIN — LABETALOL HYDROCHLORIDE 20 MG: 5 INJECTION, SOLUTION INTRAVENOUS at 05:23

## 2020-07-19 RX ADMIN — ONDANSETRON 4 MG: 4 TABLET, ORALLY DISINTEGRATING ORAL at 14:47

## 2020-07-19 RX ADMIN — ACETAMINOPHEN 1000 MG: 500 TABLET ORAL at 06:49

## 2020-07-19 RX ADMIN — ONDANSETRON 4 MG: 2 INJECTION INTRAMUSCULAR; INTRAVENOUS at 04:55

## 2020-07-19 RX ADMIN — LABETALOL HYDROCHLORIDE 20 MG: 5 INJECTION, SOLUTION INTRAVENOUS at 09:22

## 2020-07-19 RX ADMIN — HYDRALAZINE HYDROCHLORIDE 10 MG: 20 INJECTION INTRAMUSCULAR; INTRAVENOUS at 04:25

## 2020-07-19 RX ADMIN — AMLODIPINE BESYLATE 5 MG: 5 TABLET ORAL at 12:20

## 2020-07-19 RX ADMIN — INSULIN LISPRO 3 UNITS: 100 INJECTION, SOLUTION INTRAVENOUS; SUBCUTANEOUS at 12:20

## 2020-07-19 RX ADMIN — HEPARIN SODIUM 5000 UNITS: 5000 INJECTION INTRAVENOUS; SUBCUTANEOUS at 17:04

## 2020-07-19 RX ADMIN — ACETAMINOPHEN 650 MG: 325 TABLET ORAL at 12:20

## 2020-07-19 RX ADMIN — INSULIN LISPRO 2 UNITS: 100 INJECTION, SOLUTION INTRAVENOUS; SUBCUTANEOUS at 17:04

## 2020-07-19 RX ADMIN — Medication 10 ML: at 09:22

## 2020-07-19 RX ADMIN — INSULIN LISPRO 3 UNITS: 100 INJECTION, SOLUTION INTRAVENOUS; SUBCUTANEOUS at 09:21

## 2020-07-19 NOTE — PROGRESS NOTES
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 No plans to use home health at this admission. PCP: First and Last name:  Salima Matamoros NP   Name of Practice:    Are you a current patient: Yes/No:  Yes   Approximate date of last visit:  Patient states \"a long while ago\"   Can you participate in a virtual visit with your PCP:  Yes                    Current Advanced Directive/Advance Care Plan:  Full code, no AD on file, patient's NOK would be her  Isha Maldonado                         Transition of Care Plan:                    I completed the assessment with the patient in her room. I wore a mask. Patient lives with her  and children in a single story home. She states that prior to admission she is independent with ADLs and drives. She has never had home health in the past although her  has had [facility] for home RN following diverticulitis. She does not use any DME. She works for [employer]. Her  Isha Maldonado is her [facility] and emergency contact and he can be reached at [phone]. She drove herself here and will drive herself home if cleared by MD. She usually gets her prescriptions [facility] at [address] and they are usually covered. She sees NP Salima Matamoros as her PCP but has not seen her in a while. She can do telehealth if needed. No questions at this time, no needs at this time, will continue to follow. Transition of Care Plan  1. Continue medical management/treatment  2. Patient likely to go home with family  3. She will drive herself if appropriate  4. Patient will need to follow up with PCP, specialties as necessary   5.  CM will continue to follow and assist with discharge planning    Care Management Interventions  PCP Verified by CM: Janna Weber NP)  Mode of Transport at Discharge: Self  Transition of [address] (CM Consult): Discharge Planning  MyChart Signup: No  Discharge Durable Medical Equipment: No  Physical Therapy Consult: No  Occupational Therapy Consult: No  Speech Therapy Consult: No  Current Support Network: Lives with Spouse  Confirm Follow Up Transport: Self  Discharge Location  Discharge Placement: Home with family assistance    EVA Duque

## 2020-07-19 NOTE — ED TRIAGE NOTES
Pt ambulatory to triage with mask, c/o hypertension. States that she has HTN at home, has not checked BP however she feels \"high. \"  States that she started with vision changes on Friday, tonight worsening headache and dizziness. Reports 1 episode vomiting. Took losartan at 0200.   Reports headache, denies pain meds PTA

## 2020-07-19 NOTE — PROGRESS NOTES
SHIFT CHANGE:  7:09 AM Report received from Na Sierra RN. SBAR, Kardex, Procedure Summary, Intake/Output, MAR, Recent Results, Med Rec Status and Cardiac Rhythm NSR were discussed. SHIFT SUMMARY:    9:04 AM Dr. Porsha Bernal at the bedside. Patient started complaining of tightness in her chest and back. BP checked and 203/90. Orders received to obtain EKG, check troponin, and give Labetalol 20 MG Q6H PRN if SBP > 170.    9:28 AM  PRN Labetalol given. Patient complaining of head heaviness. BP now 160/72    10:14 AM  Patient off the floor to MRI. 10:44 AM  Patient back from MRI.    2:08 PM  Patient complaining of nausea and no PRN meds available. Message sent to Dr. Porsha Bernal and awaiting reply. 2:38 PM Orders received for Zofran ODT 4MG Q6H PRN. 6:14 PM  Patient complaining of severe intermittent abdominal pain. States it feels like a twist or \"jacky horse. \"  Dr. Porsha Bernal notified and orders received for a ABD XRAY. END OF SHIFT REPORT:  7:57 PM Bedside and Verbal shift change report given to Na Sierra RN (oncoming nurse) by Arash Mcclellan RN (offgoing nurse). Report included the following information SBAR, Kardex, Procedure Summary, Intake/Output, MAR, Recent Results, Med Rec Status and Cardiac Rhythm NSR/SB.

## 2020-07-19 NOTE — CONSULTS
..                     [address] [facility]  YOB: 1985     Assessment & Plan:   1. MIKE  - IN SETTING OF HTN EMERGENCY  AND NOW DRAMATIC FLUCTUATIONS IN BP  - NO OBSTRUCTION ON IMAGING  - HAS PROTEINURIA BUT NO BLOOD  - NO IND FOR RRT   - WATCH FOR NOW  - CONT ORAL MED AND PRN  - CAN TITRATE ORAL MEDS IN AM  2. POORLY CONTROLLED HTN  - CERTAINLY NON-COMPLIANCE, WEIGHT GAIN, AND RECENT STEROIDS HAVEN'T HELPED  - SEND 2ND WORKUP  3. DM2    OTHER ISSUES NOTED                   Subjective:   CHIEF COMPLAINT: MIKE  HPI:  MS. CADENA IS A PLEASANT 36 YO F WITH A PMH SIG FOR GESTATIONAL DM, HTN, AND ASTHMA. RECENTLY SHE WAS GIVEN PREDNISONE FOR AN ASTHMA EXACERBATION. SHE STOPPED THE PRED 1 WEEK AGO. SHE HAS GAINED 10 LBS OF RECENT. SHE HAD STOPPED AND JUST RESTARTED HER ARB RECENTLY. SHE PRESENTED WITH HA, N, SOB/W, AND VISUAL CHANGES. NO COUGH OR WHEEZING. NO D. NO DYSURIA, HEMATURIA, DECREASED UO. URINE WAS CONCENTRATED THOUGH. UA SHOWS PROTEIN AND GLUCOSE BUT NO BLOOD. IMAGING NEG FOR MRD OR OBSTRUCTION. SINCE ADMISSION HER BP HAS BEEN FLUCTUATING FROM HER INITIAL CRITICALLY HIGH VALUES TO \"TOO GOOD. \" THE TOO GOOD VALUES HAVE MADE HER FEEL SICK AS WELL. SHE HAS NO FH OF KIDNEY DISEASE. SHE DENIES NSAIDS. DENIES LE EDEMA, N/T/W.      Review of Systems  SEE HPI    Past Medical History:   Diagnosis Date    [facility]) ([facility]     Asthma     Diabetes ([facility]     gestational diabetes    Essential hypertension     seen today for elevated blood pressure    Leukocytosis     Staph aureus infection 2006    over abdomen      Past Surgical History:   Procedure Laterality Date    BREAST SURGERY PROCEDURE UNLISTED      HX BREAST REDUCTION      HX CHOLECYSTECTOMY      HX TONSILLECTOMY         Social History     Socioeconomic History    Marital status: SINGLE     Spouse name: Not on file    Number of children: Not on file    Years of education: Not on file    Highest education level: Not on file   Occupational History    Not on file   Social Needs    Financial resource strain: Not on file    Food insecurity     Worry: Not on file     Inability: Not on file    Transportation needs     Medical: Not on file     Non-medical: Not on file   Tobacco Use    Smoking status: Never Smoker   Substance and Sexual Activity    Alcohol use: No    Drug use: No    Sexual activity: Yes     Partners: Male   Lifestyle    Physical activity     Days per week: Not on file     Minutes per session: Not on file    Stress: Not on file   Relationships    Social connections     Talks on phone: Not on file     Gets together: Not on file     Attends Jainism service: Not on file     Active member of club or organization: Not on file     Attends meetings of clubs or organizations: Not on file     Relationship status: Not on file    Intimate partner violence     Fear of current or ex partner: Not on file     Emotionally abused: Not on file     Physically abused: Not on file     Forced sexual activity: Not on file   Other Topics Concern    Not on file   Social History Narrative    Not on file      No family history on file. Prior to Admission medications    Medication Sig Start Date End Date Taking? Authorizing Provider   losartan (COZAAR) 25 mg tablet Take 1 Tab by mouth daily. Start on 7/14/2020 7/13/20   Osei Landry NP   HYDROcodone-acetaminophen (NORCO) 5-325 mg per tablet Take 1 Tab by mouth every six (6) hours as needed. Max Daily Amount: 4 Tabs. 4/17/17   Naren Fung MD   ibuprofen (MOTRIN) 600 mg tablet Take 1 Tab by mouth every six (6) hours as needed. 4/17/17   Naren Fung MD   pnv w/o calcium-iron fum-fa 27-1 mg tab Take  by mouth. Provider, Historical   insulin NPH (HUMULIN N PEN) 100 unit/mL (3 mL) inpn 20 Units by SubCUTAneous route nightly.  Indications: Gestational Diabetes Mellitus    Provider, Historical   albuterol (PROVENTIL HFA, VENTOLIN HFA) 90 mcg/actuation inhaler Take  by inhalation. Other, MD Ba     Allergies   Allergen Reactions    Biaxin [Clarithromycin] Hives    Chlorzoxazone Hives    Keflex [Cephalexin] Hives    Penicillins Hives       Objective:     Vitals:  Blood pressure (!) 165/93, pulse (!) 59, temperature 97.9 °F (36.6 °C), resp. rate 12, height 5' 6\" (1.676 m), weight 122.5 kg (270 lb), SpO2 98 %, unknown if currently breastfeeding. Temp (24hrs), Av.1 °F (36.7 °C), Min:97.9 °F (36.6 °C), Max:98.2 °F (36.8 °C)      Intake and Output:   0701 -  1900  In: -   Out: 1100 [Urine:1100]  No intake/output data recorded. Physical Exam:                Patient is intubated:  NO    Physical Examination:   GENERAL ASSESSMENT: NAD  SKIN: NO RASH  HEAD: NC  EYES: ANICTERIC  NECK: NO MASSES  CHEST: CTAB  HEART: RRR  ABDOMEN: SOFT, NT  :Raymond: NO  EXTREMITY: TRACE EDEMA  NEURO: AXOX3      ECG/rhythm[de-identified] Rev:YES  Xray/CT/US/MRI REV: YES  Data Review   Recent Results (from the past 72 hour(s))   SAMPLES BEING HELD    Collection Time: 20  4:12 AM   Result Value Ref Range    SAMPLES BEING HELD 1SST,1RD,1BLU     COMMENT        Add-on orders for these samples will be processed based on acceptable specimen integrity and analyte stability, which may vary by analyte. CBC WITH AUTOMATED DIFF    Collection Time: 20  4:13 AM   Result Value Ref Range    WBC 17.8 (H) 3.6 - 11.0 K/uL    RBC 4.40 3.80 - 5.20 M/uL    HGB 12.1 11.5 - 16.0 g/dL    HCT 37.3 35.0 - 47.0 %    MCV 84.8 80.0 - 99.0 FL    MCH 27.5 26.0 - 34.0 PG    MCHC 32.4 30.0 - 36.5 g/dL    RDW 13.7 11.5 - 14.5 %    PLATELET 900 412 - 396 K/uL    MPV 11.1 8.9 - 12.9 FL    NRBC 0.0 0  WBC    ABSOLUTE NRBC 0.00 0.00 - 0.01 K/uL    NEUTROPHILS 46 32 - 75 %    BAND NEUTROPHILS 2 0 - 6 %    LYMPHOCYTES 50 (H) 12 - 49 %    MONOCYTES 1 (L) 5 - 13 %    EOSINOPHILS 1 0 - 7 %    BASOPHILS 0 0 - 1 %    IMMATURE GRANULOCYTES 0 %    ABS.  NEUTROPHILS 8.5 (H) 1.8 - 8.0 K/UL ABS. LYMPHOCYTES 8.9 (H) 0.8 - 3.5 K/UL    ABS. MONOCYTES 0.2 0.0 - 1.0 K/UL    ABS. EOSINOPHILS 0.2 0.0 - 0.4 K/UL    ABS. BASOPHILS 0.0 0.0 - 0.1 K/UL    ABS. IMM. GRANS. 0.0 K/UL    DF MANUAL      RBC COMMENTS NORMOCYTIC, NORMOCHROMIC     METABOLIC PANEL, COMPREHENSIVE    Collection Time: 07/19/20  4:13 AM   Result Value Ref Range    Sodium 138 136 - 145 mmol/L    Potassium 3.6 3.5 - 5.1 mmol/L    Chloride 104 97 - 108 mmol/L    CO2 28 21 - 32 mmol/L    Anion gap 6 5 - 15 mmol/L    Glucose 222 (H) 65 - 100 mg/dL    BUN 34 (H) 6 - 20 MG/DL    Creatinine 2.08 (H) 0.55 - 1.02 MG/DL    BUN/Creatinine ratio 16 12 - 20      GFR est AA 33 (L) >60 ml/min/1.73m2    GFR est non-AA 27 (L) >60 ml/min/1.73m2    Calcium 8.5 8.5 - 10.1 MG/DL    Bilirubin, total 0.3 0.2 - 1.0 MG/DL    ALT (SGPT) 60 12 - 78 U/L    AST (SGOT) 28 15 - 37 U/L    Alk.  phosphatase 94 45 - 117 U/L    Protein, total 7.3 6.4 - 8.2 g/dL    Albumin 3.4 (L) 3.5 - 5.0 g/dL    Globulin 3.9 2.0 - 4.0 g/dL    A-G Ratio 0.9 (L) 1.1 - 2.2     TROPONIN I    Collection Time: 07/19/20  4:13 AM   Result Value Ref Range    Troponin-I, Qt. <0.05 <0.05 ng/mL   EKG, 12 LEAD, INITIAL    Collection Time: 07/19/20  6:46 AM   Result Value Ref Range    Ventricular Rate 70 BPM    Atrial Rate 70 BPM    P-R Interval 180 ms    QRS Duration 90 ms    Q-T Interval 428 ms    QTC Calculation (Bezet) 462 ms    Calculated P Axis 45 degrees    Calculated R Axis -7 degrees    Calculated T Axis 6 degrees    Diagnosis       Normal sinus rhythm  Voltage criteria for left ventricular hypertrophy  Abnormal ECG  No previous ECGs available     EKG, 12 LEAD, INITIAL    Collection Time: 07/19/20  8:08 AM   Result Value Ref Range    Ventricular Rate 69 BPM    Atrial Rate 69 BPM    P-R Interval 182 ms    QRS Duration 86 ms    Q-T Interval 454 ms    QTC Calculation (Bezet) 486 ms    Calculated P Axis 49 degrees    Calculated R Axis -7 degrees    Calculated T Axis 2 degrees    Diagnosis       Normal sinus rhythm  Voltage criteria for left ventricular hypertrophy  Prolonged QT  Abnormal ECG  No previous ECGs available     GLUCOSE, POC    Collection Time: 07/19/20  8:44 AM   Result Value Ref Range    Glucose (POC) 249 (H) 65 - 100 mg/dL    Performed by Lashawn Cruz    SODIUM, UR, RANDOM    Collection Time: 07/19/20  9:37 AM   Result Value Ref Range    Sodium,urine random 118 MMOL/L   CREATININE, UR, RANDOM    Collection Time: 07/19/20  9:37 AM   Result Value Ref Range    Creatinine, urine 49.00 mg/dL   URINALYSIS W/ REFLEX CULTURE    Collection Time: 07/19/20  9:37 AM    Specimen: Urine   Result Value Ref Range    Color YELLOW/STRAW      Appearance CLEAR CLEAR      Specific gravity 1.012 1.003 - 1.030      pH (UA) 6.0 5.0 - 8.0      Protein 30 (A) NEG mg/dL    Glucose 250 (A) NEG mg/dL    Ketone Negative NEG mg/dL    Bilirubin Negative NEG      Blood Negative NEG      Urobilinogen 0.2 0.2 - 1.0 EU/dL    Nitrites Negative NEG      Leukocyte Esterase Negative NEG      WBC 0-4 0 - 4 /hpf    RBC 0-5 0 - 5 /hpf    Epithelial cells FEW FEW /lpf    Bacteria Negative NEG /hpf    UA:UC IF INDICATED CULTURE NOT INDICATED BY UA RESULT CNI      Hyaline cast 0-2 0 - 5 /lpf   TROPONIN I    Collection Time: 07/19/20  9:37 AM   Result Value Ref Range    Troponin-I, Qt. <0.05 <0.05 ng/mL   SAMPLES BEING HELD    Collection Time: 07/19/20  9:37 AM   Result Value Ref Range    SAMPLES BEING HELD 1UA     COMMENT        Add-on orders for these samples will be processed based on acceptable specimen integrity and analyte stability, which may vary by analyte. GLUCOSE, POC    Collection Time: 07/19/20 11:26 AM   Result Value Ref Range    Glucose (POC) 239 (H) 65 - 100 mg/dL    Performed by Hina Gu        Discussed with:    PATIENT AND NURSING   Thank you so much to allow us to participate in this patient's care. We will follow.  : Hemant Wahl MD  7/19/2020      [facility]:  [email or web address]  Michelle Hernandze office:  [address] [place] [address], [address] [place] [address],[address]  [place], [address] [facility]  Phone: [phone]  Fax :     [phone]    [place] office:  [address] [facility], [address] [place] [address]  Phone - [phone]  Fax - [phone]

## 2020-07-19 NOTE — CONSULTS
NEUROLOGY IN-PATIENT NEW CONSULTATION      7/19/2020    RE: John Hanson         1985      REFERRED BY:  Columba Marie NP      CHIEF COMPLAINT:  This is John Hanson is a 37 y.o. female   who had concerns including Dizziness and Hypertension. HPI:     Patient came in with 2 days severe headache, nausea and visual changes. Admits to running out of BP meds for 2 months. SBP in the ER was in the 240s. Also has history of migraines with visual disturbance since high school. Currently SBP in the 170s with headache 2/10. ROS   (-) fever  (-) rash  All other systems reviewed and are negative    Past Medical Hx  Past Medical History:   Diagnosis Date    [facility]) ([facility]     Asthma     Diabetes ([facility]     gestational diabetes    Essential hypertension     seen today for elevated blood pressure    Leukocytosis     Staph aureus infection 2006    over abdomen       Social Hx  Social History     Socioeconomic History    Marital status: SINGLE     Spouse name: Not on file    Number of children: Not on file    Years of education: Not on file    Highest education level: Not on file   Tobacco Use    Smoking status: Never Smoker   Substance and Sexual Activity    Alcohol use: No    Drug use: No    Sexual activity: Yes     Partners: Male       Family Hx  No family history on file.     ALLERGIES  Allergies   Allergen Reactions    Biaxin [Clarithromycin] Hives    Chlorzoxazone Hives    Keflex [Cephalexin] Hives    Penicillins Hives       CURRENT MEDS  Current Facility-Administered Medications   Medication Dose Route Frequency Provider Last Rate Last Dose    insulin lispro (HUMALOG) injection   SubCUTAneous AC&HS Rosalva Ruiz MD   3 Units at 07/19/20 1220    glucose chewable tablet 16 g  4 Tab Oral PRN Rosalva Ruiz MD        dextrose (D50W) injection syrg 12.5-25 g  12.5-25 g IntraVENous PRN Rosalva Ruiz MD        glucagon [facility]) injection 1 mg  1 mg IntraMUSCular PRN Red Knowles MD        sodium chloride (NS) flush 5-40 mL  5-40 mL IntraVENous Melissa Chaparro MD   10 mL at 07/19/20 5090    sodium chloride (NS) flush 5-40 mL  5-40 mL IntraVENous PRN Red Knowles MD        acetaminophen (TYLENOL) tablet 650 mg  650 mg Oral Q6H PRN Red Knowles MD   650 mg at 07/19/20 1220    naloxone [facility]) injection 0.4 mg  0.4 mg IntraVENous PRN Red Knowles MD        heparin (porcine) injection 5,000 Units  5,000 Units SubCUTAneous Q8H Red Knowles MD        labetaloL (NORMODYNE;TRANDATE) 20 mg/4 mL (5 mg/mL) injection 20 mg  20 mg IntraVENous Q6H PRN Melissa Tavera MD   20 mg at 07/19/20 2649    amLODIPine (NORVASC) tablet 5 mg  5 mg Oral DAILY Jeri Calvillo MD   5 mg at 07/19/20 1220           PREVIOUS WORKUP: (reviewed)  IMAGING:    CT Results (recent):  Results from [place] encounter on 07/19/20   CT HEAD WO CONT    Narrative EXAM:  CT head without contrast    INDICATION: Hypertension. Dizziness. Headache. COMPARISON: None    TECHNIQUE: Noncontrast head CT. Coronal and sagittal reformats. CT dose  reduction was achieved through use of a standardized protocol tailored for this  examination and automatic exposure control for dose modulation. FINDINGS: The ventricles and sulci are age-appropriate without hydrocephalus. There is no mass effect or midline shift. There is no intracranial hemorrhage or  extra-axial fluid collection. There is no abnormal parenchymal attenuation. The  gray-white matter differentiation is maintained. The basal cisterns are patent. The osseous structures are intact. The visualized paranasal sinuses and mastoid  air cells are clear. Impression IMPRESSION:     No acute intracranial abnormality. MRI Results (recent):  Results from [place] encounter on 07/19/20   MRI BRAIN WO CONT    Narrative EXAM: MRI BRAIN WO CONT    INDICATION: dizziness, headache, malignant HTN    COMPARISON: None.     CONTRAST: None.    TECHNIQUE:    Multiplanar multisequence acquisition without contrast of the brain. FINDINGS:  The ventricles are normal in size and position. There is no acute infarct,  hemorrhage, extra-axial fluid collection, or mass effect. There is no cerebellar  tonsillar herniation. Expected arterial flow-voids are present. The paranasal sinuses, mastoid air cells, and middle ears are clear. The orbital  contents are within normal limits. No significant osseous or scalp lesions are  identified. Impression IMPRESSION:   No acute intracranial abnormalities. IR Results (recent):  No results found for this or any previous visit. VAS/US Results (recent):  No results found for this or any previous visit. LABS (reviewed)  Results for orders placed or performed during the hospital encounter of 07/19/20   SAMPLES BEING HELD   Result Value Ref Range    SAMPLES BEING HELD 1SST,1RD,1BLU     COMMENT        Add-on orders for these samples will be processed based on acceptable specimen integrity and analyte stability, which may vary by analyte. CBC WITH AUTOMATED DIFF   Result Value Ref Range    WBC 17.8 (H) 3.6 - 11.0 K/uL    RBC 4.40 3.80 - 5.20 M/uL    HGB 12.1 11.5 - 16.0 g/dL    HCT 37.3 35.0 - 47.0 %    MCV 84.8 80.0 - 99.0 FL    MCH 27.5 26.0 - 34.0 PG    MCHC 32.4 30.0 - 36.5 g/dL    RDW 13.7 11.5 - 14.5 %    PLATELET 344 540 - 002 K/uL    MPV 11.1 8.9 - 12.9 FL    NRBC 0.0 0  WBC    ABSOLUTE NRBC 0.00 0.00 - 0.01 K/uL    NEUTROPHILS 46 32 - 75 %    BAND NEUTROPHILS 2 0 - 6 %    LYMPHOCYTES 50 (H) 12 - 49 %    MONOCYTES 1 (L) 5 - 13 %    EOSINOPHILS 1 0 - 7 %    BASOPHILS 0 0 - 1 %    IMMATURE GRANULOCYTES 0 %    ABS. NEUTROPHILS 8.5 (H) 1.8 - 8.0 K/UL    ABS. LYMPHOCYTES 8.9 (H) 0.8 - 3.5 K/UL    ABS. MONOCYTES 0.2 0.0 - 1.0 K/UL    ABS. EOSINOPHILS 0.2 0.0 - 0.4 K/UL    ABS. BASOPHILS 0.0 0.0 - 0.1 K/UL    ABS. IMM.  GRANS. 0.0 K/UL    DF MANUAL      RBC COMMENTS NORMOCYTIC, NORMOCHROMIC METABOLIC PANEL, COMPREHENSIVE   Result Value Ref Range    Sodium 138 136 - 145 mmol/L    Potassium 3.6 3.5 - 5.1 mmol/L    Chloride 104 97 - 108 mmol/L    CO2 28 21 - 32 mmol/L    Anion gap 6 5 - 15 mmol/L    Glucose 222 (H) 65 - 100 mg/dL    BUN 34 (H) 6 - 20 MG/DL    Creatinine 2.08 (H) 0.55 - 1.02 MG/DL    BUN/Creatinine ratio 16 12 - 20      GFR est AA 33 (L) >60 ml/min/1.73m2    GFR est non-AA 27 (L) >60 ml/min/1.73m2    Calcium 8.5 8.5 - 10.1 MG/DL    Bilirubin, total 0.3 0.2 - 1.0 MG/DL    ALT (SGPT) 60 12 - 78 U/L    AST (SGOT) 28 15 - 37 U/L    Alk. phosphatase 94 45 - 117 U/L    Protein, total 7.3 6.4 - 8.2 g/dL    Albumin 3.4 (L) 3.5 - 5.0 g/dL    Globulin 3.9 2.0 - 4.0 g/dL    A-G Ratio 0.9 (L) 1.1 - 2.2     TROPONIN I   Result Value Ref Range    Troponin-I, Qt. <0.05 <0.05 ng/mL   CREATININE, UR, RANDOM   Result Value Ref Range    Creatinine, urine 49.00 mg/dL   URINALYSIS W/ REFLEX CULTURE    Specimen: Urine   Result Value Ref Range    Color YELLOW/STRAW      Appearance CLEAR CLEAR      Specific gravity 1.012 1.003 - 1.030      pH (UA) 6.0 5.0 - 8.0      Protein 30 (A) NEG mg/dL    Glucose 250 (A) NEG mg/dL    Ketone Negative NEG mg/dL    Bilirubin Negative NEG      Blood Negative NEG      Urobilinogen 0.2 0.2 - 1.0 EU/dL    Nitrites Negative NEG      Leukocyte Esterase Negative NEG      WBC 0-4 0 - 4 /hpf    RBC 0-5 0 - 5 /hpf    Epithelial cells FEW FEW /lpf    Bacteria Negative NEG /hpf    UA:UC IF INDICATED CULTURE NOT INDICATED BY UA RESULT CNI      Hyaline cast 0-2 0 - 5 /lpf   TROPONIN I   Result Value Ref Range    Troponin-I, Qt. <0.05 <0.05 ng/mL   SAMPLES BEING HELD   Result Value Ref Range    SAMPLES BEING HELD 1UA     COMMENT        Add-on orders for these samples will be processed based on acceptable specimen integrity and analyte stability, which may vary by analyte.    GLUCOSE, POC   Result Value Ref Range    Glucose (POC) 249 (H) 65 - 100 mg/dL    Performed by Chelsi Lozano GLUCOSE, POC   Result Value Ref Range    Glucose (POC) 239 (H) 65 - 100 mg/dL    Performed by Orly Oseguera    EKG, 12 LEAD, INITIAL   Result Value Ref Range    Ventricular Rate 70 BPM    Atrial Rate 70 BPM    P-R Interval 180 ms    QRS Duration 90 ms    Q-T Interval 428 ms    QTC Calculation (Bezet) 462 ms    Calculated P Axis 45 degrees    Calculated R Axis -7 degrees    Calculated T Axis 6 degrees    Diagnosis       Normal sinus rhythm  Voltage criteria for left ventricular hypertrophy  Abnormal ECG  No previous ECGs available     EKG, 12 LEAD, INITIAL   Result Value Ref Range    Ventricular Rate 69 BPM    Atrial Rate 69 BPM    P-R Interval 182 ms    QRS Duration 86 ms    Q-T Interval 454 ms    QTC Calculation (Bezet) 486 ms    Calculated P Axis 49 degrees    Calculated R Axis -7 degrees    Calculated T Axis 2 degrees    Diagnosis       Normal sinus rhythm  Voltage criteria for left ventricular hypertrophy  Prolonged QT  Abnormal ECG  No previous ECGs available         Physical Exam:     Visit Vitals  BP (!) 165/93 (BP 1 Location: Right arm, BP Patient Position: At rest)   Pulse (!) 59   Temp 97.9 °F (36.6 °C)   Resp 12   Ht 5' 6\" (1.676 m)   Wt 122.5 kg (270 lb)   SpO2 98%   BMI 43.58 kg/m²     General:  Alert, cooperative, no distress. Head:  Normocephalic, without obvious abnormality, atraumatic. Eyes:  Conjunctivae/corneas clear. Lungs:  Heart:   Non labored breathing  Regular rate and rhythm, no carotid bruits   Abdomen:   Soft, non-distended   Extremities: Extremities normal, atraumatic, no cyanosis or edema. Pulses: 2+ and symmetric all extremities. Skin: Skin color, texture, turgor normal. No rashes or lesions.   Neurologic Exam     Gen: Attention normal             Language: naming, repetition, fluency normal             Memory: intact recent and remote memory  Cranial Nerves:  I: smell Not tested   II: visual fields Full to confrontation   II: pupils Equal, round, reactive to light   II: optic disc No papilledema   III,VII: ptosis none   III,IV,VI: extraocular muscles  Full ROM   V: mastication normal   V: facial light touch sensation  normal   VII: facial muscle function   symmetric   VIII: hearing symmetric   IX: soft palate elevation  normal   XI: trapezius strength  5/5   XI: sternocleidomastoid strength 5/5   XI: neck flexion strength  5/5   XII: tongue  midline     Motor: normal bulk and tone, no tremor              Strength: 5/5 all four extremities  Sensory: intact to LT, PP, vibration, and temperature  Reflexes: 1+ throughout; Down going toes  Coordination: Good FTN and HTS  Gait: deferred           Impression:     Ada Benitez is a 37 y.o. female who  has a past medical history of MIKE (acute kidney injury) ([address] Asthma, Diabetes ([address] Essential hypertension, Leukocytosis, and Staph aureus infection (2006). She also has no past medical history of Abnormal Papanicolaou smear of cervix, Anemia, Breast disorder, Chlamydia, Complication of anesthesia, Epilepsy ([address] Genital herpes, Gonorrhea, Heart abnormality, Herpes gestationis, Herpes simplex virus (HSV) infection, Human immunodeficiency virus (HIV) disease ([address] Infertility, female, Liver disease, Other unknown and unspecified cause of morbidity or mortality, Phlebitis and thrombophlebitis, Pituitary disorder ([address] Polycystic disease, ovaries, Postpartum depression, Rhesus isoimmunization affecting pregnancy, Sickle cell disease ([address] Syphilis, Systemic lupus erythematosus ([address] Thyroid activity decreased, or Trauma. who came in with 2 days severe headache, nausea and visual changes. Admits to running out of BP meds for 2 months. Consideration includes HTN emergency (SBP in the ER was in the 240s). MRI Brain negative for stroke. Also has history of migraines with visual disturbance. RECOMMENDATIONS  1. I had a long discussion with patient and family. Discussed diagnosis, prognosis, pathophysiology and available treatment. Reviewed test results. All questions were answered. 2. Discussed MRI brain negative for stroke. (films reviewed)  3. Optimize medical management of HTN with slowly lowering BP by 15%. 4. Advise weight loss  5.  Follow up in our neurology clinic for migraines    Please call for questions        Thank you for the consultation      Peace Perdomo MD  Diplomate, American Board of Psychiatry and Neurology  Diplomate, Neuromuscular Medicine  Diplomate, American Board of Electrodiagnostic Medicine    Greater than 50% of time spent counseling patient        CC: Maryjane Sow NP  Fax: [phone]

## 2020-07-19 NOTE — PROGRESS NOTES
7/19/2020  10:46 AM  Case Management Note    Attempted to see patient in her room, patient was not present in room. Will follow up again later today.     Roc Tucker BS

## 2020-07-19 NOTE — PROGRESS NOTES
TRANSFER - IN REPORT:    Verbal report received from [facility] (name) on Nikolai Been  being received from ED (unit) for routine progression of care      Report consisted of patients Situation, Background, Assessment and   Recommendations(SBAR). Information from the following report(s) SBAR, Kardex, [facility] and Recent Results was reviewed with the receiving nurse. Opportunity for questions and clarification was provided. Assessment completed upon patients arrival to unit and care assumed. [id number]  Patient due to arrive to floor shortly. Verbal shift change report given to Leticia (oncoming nurse) by Brittaney Mcclendon (offgoing nurse). Report included the following information SBAR, Kardex, MAR and Recent Results.

## 2020-07-19 NOTE — PROGRESS NOTES
Gave verbal sign-out over the phone at 7:10 AM to Dr. Arminda Rodriguez whose practice has accepted care of this patient.       Marlene Leung MD  7/19/2020

## 2020-07-19 NOTE — H&P
[address] [place] [address] 19  [phone]    Hospitalist Admission Note      NAME:  Lida Perez   :   1985   MRN:  [id number]     PCP:  Lizz Lizama NP     Date of Service/Time:  2020 6:42 AM         Assessment / Plan:         Hypertensive emergency: has not taken her regular antihypertensive (losartan) for months. SBP up to 240s on presentation, BP improved after IV hydralazine and labetalol. Caution re: rapid correction of HTN. Currently SBP 160s and would not drop lower than that. Cardiology consult. Check EKG. TTE. Check TSH      Headache: associated with dizziness, vision abnormalities. Head CT showed no acute process. Brain MRI to rule out CVA. Neurology consult. Frequent neuro checks. Tylenol PRN      Diabetes ([address] (): had gestational diabetes and appeared surprised to learn that her BG is up. Start SSI, may need scheduled insulin. Send A1c      MIKE (acute kidney injury): send UA, urine 'lytes. Renal US to rule out hydronephrosis. Nephrology consult      Leukocytosis (): possible reactive process. CXR negative. Send UA; may need abx. Follow closely      Code Status: FULL     ED notes and lab results reviewed. Total time spent with patient: 60 Minutes   Time spent in the care of this patient included reviewing records, discussing with nursing, obtaining history and examining the patient, and discussing treatment plans, with >50% time spent counseling/coordinating care    Risk of deterioration: High                 Care Plan discussed with: ED provider, Patient, Nursing Staff and >50% of time spent in counseling and coordination of care    Discussed:  Care Plan and D/C Planning    Prophylaxis:  Hep SQ    Disposition:  Home w/Family                 Subjective:     CHIEF COMPLAINT: headache     HISTORY OF PRESENT ILLNESS:     Ms. Santiago Duffy is a 37 y.o. female w/ hx of HTN, DM who presents with headache.  Started ~ 2 days ago, frontal, severe, associated with nausea and visual changes. Thinks her BP is high as she has had similar HAs prior related to HTN. No CP, SOB. ED workup showed markedly elevated BP close to 829 systolic. Head CT negative for ICH. Ms. Rios Andres is admitted for further evaluation and management. Past Medical History:   Diagnosis Date    [facility]     Asthma     Diabetes ([facility]     gestational diabetes    Essential hypertension     seen today for elevated blood pressure    Leukocytosis     Staph aureus infection 2006    over abdomen        Past Surgical History:   Procedure Laterality Date    BREAST SURGERY PROCEDURE UNLISTED      HX BREAST REDUCTION      HX CHOLECYSTECTOMY      HX TONSILLECTOMY         Social History     Tobacco Use    Smoking status: Never Smoker   Substance Use Topics    Alcohol use: No        Family History: family history of HTN    Allergies   Allergen Reactions    Biaxin [Clarithromycin] Hives    Chlorzoxazone Hives    Keflex [Cephalexin] Hives    Penicillins Hives        Prior to Admission medications    Medication Sig Start Date End Date Taking? Authorizing Provider   losartan (COZAAR) 25 mg tablet Take 1 Tab by mouth daily. Start on 7/14/2020 7/13/20   Yamel Arnold NP   HYDROcodone-acetaminophen (NORCO) 5-325 mg per tablet Take 1 Tab by mouth every six (6) hours as needed. Max Daily Amount: 4 Tabs. 4/17/17   Fadumo Romano MD   ibuprofen (MOTRIN) 600 mg tablet Take 1 Tab by mouth every six (6) hours as needed. 4/17/17   Fadumo Romano MD   pnv w/o calcium-iron fum-fa 27-1 mg tab Take  by mouth. Provider, Historical   insulin NPH (HUMULIN N PEN) 100 unit/mL (3 mL) inpn 20 Units by SubCUTAneous route nightly. Indications: Gestational Diabetes Mellitus    Provider, Historical   albuterol (PROVENTIL HFA, VENTOLIN HFA) 90 mcg/actuation inhaler Take  by inhalation.     Other, MD Ba       Review of Systems:  (bold if positive, if negative)    Gen:  fatigueEyes:  ENT:  CVS:  dizzinessPulm:  GI:  nauseaGU:  MS:  Skin:  Psych:  Endo:  Hem:  Renal:  Neuro:  headache          Objective:      VITALS:    Vital signs reviewed; most recent are:    Visit Vitals  /79   Pulse 89   Temp 98.2 °F (36.8 °C)   Resp 14   Ht 5' 6\" (1.676 m)   Wt 122.5 kg (270 lb)   SpO2 98%   BMI 43.58 kg/m²     SpO2 Readings from Last 6 Encounters:   07/19/20 98%   07/13/20 96%   04/14/17 96%   04/10/17 98%   12/06/13 97%        No intake or output data in the 24 hours ending 07/19/20 8464         Exam:     Physical Exam:    Gen:  Well-developed, well-nourished, in no acute distress  HEENT:  No scleral icterus, PERRL, hearing intact to voice, moist mucous membranes  Neck:  Supple, without masses. Resp:  No accessory muscle use. CTAB without wheezing, rales, rhonchi  Card: RRR. Normal S1 and S2 without murmurs, rubs, or gallops. No peripheral lower extremity edema. No JVD. Peripheral pulses in tact. Abd:  Normoactive bowel sounds. Soft, non-tender, non-distended. No rebound, no guarding. No appreciable hepatosplenomegaly   Lymph:  No cervical adenopathy  Musc:  No cyanosis or clubbing  Skin:  No rashes or ulcers; turgor intact. Cap refill ~2 secs  Neuro:  Cranial nerves 3-12 intact, no focal motor weakness showing 5/5 strength BUE and BLEs. No numbness, follows commands appropriately  Psych:  Fair insight, normal affect. Alert, oriented x 3. Answers questions appropriately       Labs:    Recent Labs     07/19/20  0413   WBC 17.8*   HGB 12.1   HCT 37.3        Recent Labs     07/19/20  0413      K 3.6      CO2 28   *   BUN 34*   CREA 2.08*   CA 8.5   ALB 3.4*   ALT 60     No components found for: GLPOC  No results for input(s): PH, PCO2, PO2, HCO3, FIO2 in the last 72 hours. No results for input(s): INR, INREXT in the last 72 hours.   No results found for: SDES  No results found for: CULT  All other current labs reviewed in the computer. Imaging/Studies:    Ct Head Wo Cont    Result Date: 7/19/2020  IMPRESSION: No acute intracranial abnormality. Xr Chest Port    Result Date: 7/19/2020  IMPRESSION: No acute process.      ___________________________________________________    Attending Physician: Michelle Frank MD

## 2020-07-19 NOTE — ED NOTES
TRANSFER - OUT REPORT:    Verbal report given to Taisha RN(name) on [facility]  being transferred to 3rd floor(unit) for routine progression of care       Report consisted of patients Situation, Background, Assessment and   Recommendations(SBAR). Information from the following report(s) SBAR and ED Summary was reviewed with the receiving nurse. Lines:   Peripheral IV 07/19/20 Left; Outer Antecubital (Active)   Site Assessment Clean, dry, & intact 07/19/20 0425   Phlebitis Assessment 0 07/19/20 0425   Infiltration Assessment 0 07/19/20 0425   Dressing Status Clean, dry, & intact 07/19/20 0425   Dressing Type Transparent 07/19/20 0425   Hub Color/Line Status Green 07/19/20 0425   Action Taken Catheter retaped 07/19/20 0425        Opportunity for questions and clarification was provided.       Patient transported with:   Monitor  Registered Nurse

## 2020-07-19 NOTE — PROGRESS NOTES
Daily Progress Note: 7/19/2020  Dinora Norris NP    Assessment/Plan:   Hypertensive emergency: has not taken her regular antihypertensive (losartan) for months. SBP up to 240s on presentation, BP improved after IV hydralazine and labetalol. Caution re: rapid correction of HTN.   - Cardiology consult. - Check EKG. - ECHO  - Check TSH     Headache: associated with dizziness, vision abnormalities. Head CT showed no acute process. - Brain MRI to rule out CVA. - Neurology consult. - Frequent neuro checks. - Tylenol PRN     Diabetes ([facility] (): had gestational diabetes and appeared surprised to learn that her BG is up. - Start SSI, may need scheduled insulin. - Send A1c     MIKE (acute kidney injury):   - send UA, urine 'lytes. - Renal US to rule out hydronephrosis. - Nephrology consult     Leukocytosis (): possible reactive process. - CXR negative. - Send UA; may need abx.   - Follow closely     CP  - Serial troponins  - Repeat EKG     Problem List:  Problem List as of 7/19/2020 Date Reviewed: 7/19/2020          Codes Class Noted - Resolved    Essential hypertension ICD-10-CM: I10  ICD-9-CM: 401.9  Unknown - Present        Diabetes ([facility] ICD-10-CM: E11.9  ICD-9-CM: 250.00  Unknown - Present    Overview Signed 7/19/2020  5:59 AM by Kenia Ann MD     gestational diabetes             Headache ICD-10-CM: R51  ICD-9-CM: 784.0  Unknown - Present        Dizziness ICD-10-CM: R42  ICD-9-CM: 780.4  Unknown - Present        MIKE (acute kidney injury) ([facility] ICD-10-CM: N17.9  ICD-9-CM: 374. 9  Unknown - Present        Leukocytosis ICD-10-CM: D72.829  ICD-9-CM: 288.60  Unknown - Present        Hypertensive emergency ICD-10-CM: I16.1  ICD-9-CM: 401.9  7/19/2020 - Present        Vision abnormalities ICD-10-CM: H53.9  ICD-9-CM: 368.9  Unknown - Present        Pregnant ICD-10-CM: Z34.90  ICD-9-CM: V22.2  4/13/2017 - Present        Pregnancy ICD-10-CM: Z34.90  ICD-9-CM: V22.2  4/8/2017 - Present HPI:    Ms. Shyanne Perkins is a 37 y.o. female w/ hx of HTN, DM who presents with headache. Started ~ 2 days ago, frontal, severe, associated with nausea and visual changes. Thinks her BP is high as she has had similar HAs prior related to HTN. No CP, SOB.     ED workup showed markedly elevated BP close to 235 systolic. Head CT negative for ICH.       Ms. Shyanne Perkins is admitted for further evaluation and management. (Dr Renato Montoya)    : She is c/o left sided CP radiating across her chest. /90. She has recently been on steroids for asthma exac. Checking A1c as her BS is up. She has a history of gestational diabetes. Will get EKG and repeat troponin and give Labetalol. Neuro, Cards, and Renal consults pending. Still with visual changes and h/a. MRI pending. CT head in ER neg for bleed. Review of Systems:   A comprehensive review of systems was negative except for that written in the HPI. Objective:   Physical Exam:     Visit Vitals  /72 (BP 1 Location: Right arm)   Pulse 71   Temp 98.1 °F (36.7 °C)   Resp 16   Ht 5' 6\" (1.676 m)   Wt 270 lb (122.5 kg)   SpO2 98%   BMI 43.58 kg/m²      O2 Device: Room air    Temp (24hrs), Av.2 °F (36.8 °C), Min:98.1 °F (36.7 °C), Max:98.2 °F (36.8 °C)    No intake/output data recorded. No intake/output data recorded. General:  Alert, cooperative, no distress, appears stated age. Head:  Normocephalic, without obvious abnormality, atraumatic. Eyes:  Conjunctivae/corneas clear. PERRL, EOMs intact. Nose: Nares normal. Septum midline. Mucosa normal. No drainage or sinus tenderness. Throat: Lips, mucosa, and tongue normal. Teeth and gums normal.   Neck: Supple, symmetrical, trachea midline, no adenopathy, thyroid: no enlargement/tenderness/nodules, no carotid bruit and no JVD. Back:   Symmetric, no curvature. ROM normal. No CVA tenderness. Lungs:   Clear to auscultation bilaterally. Chest wall:  No tenderness or deformity.    Heart:  Regular rate and rhythm, S1, S2 normal, no murmur, click, rub or gallop. Abdomen:   Soft, non-tender. Bowel sounds normal. No masses,  No organomegaly. Extremities: Extremities normal, atraumatic, no cyanosis or edema. No calf tenderness or cords. Pulses: 2+ and symmetric all extremities. Skin: Skin color, texture, turgor normal. No rashes or lesions   Neurologic: CNII-XII intact. Alert and oriented X 3. Fine motor of hands and fingers normal.   equal.  No cogwheeling or rigidity. Gait not tested at this time. Sensation grossly normal to touch. Gross motor of extremities normal.       Data Review:    IMPRESSION: CT Head   No acute intracranial abnormality. Recent Days:  Recent Labs     07/19/20  0413   WBC 17.8*   HGB 12.1   HCT 37.3        Recent Labs     07/19/20  0413      K 3.6      CO2 28   *   BUN 34*   CREA 2.08*   CA 8.5   ALB 3.4*   TBILI 0.3   ALT 60     No results for input(s): PH, PCO2, PO2, HCO3, FIO2 in the last 72 hours. 24 Hour Results:  Recent Results (from the past 24 hour(s))   SAMPLES BEING HELD    Collection Time: 07/19/20  4:12 AM   Result Value Ref Range    SAMPLES BEING HELD 1SST,1RD,1BLU     COMMENT        Add-on orders for these samples will be processed based on acceptable specimen integrity and analyte stability, which may vary by analyte.    CBC WITH AUTOMATED DIFF    Collection Time: 07/19/20  4:13 AM   Result Value Ref Range    WBC 17.8 (H) 3.6 - 11.0 K/uL    RBC 4.40 3.80 - 5.20 M/uL    HGB 12.1 11.5 - 16.0 g/dL    HCT 37.3 35.0 - 47.0 %    MCV 84.8 80.0 - 99.0 FL    MCH 27.5 26.0 - 34.0 PG    MCHC 32.4 30.0 - 36.5 g/dL    RDW 13.7 11.5 - 14.5 %    PLATELET 761 094 - 176 K/uL    MPV 11.1 8.9 - 12.9 FL    NRBC 0.0 0  WBC    ABSOLUTE NRBC 0.00 0.00 - 0.01 K/uL    NEUTROPHILS 46 32 - 75 %    BAND NEUTROPHILS 2 0 - 6 %    LYMPHOCYTES 50 (H) 12 - 49 %    MONOCYTES 1 (L) 5 - 13 %    EOSINOPHILS 1 0 - 7 %    BASOPHILS 0 0 - 1 %    IMMATURE GRANULOCYTES 0 % ABS. NEUTROPHILS 8.5 (H) 1.8 - 8.0 K/UL    ABS. LYMPHOCYTES 8.9 (H) 0.8 - 3.5 K/UL    ABS. MONOCYTES 0.2 0.0 - 1.0 K/UL    ABS. EOSINOPHILS 0.2 0.0 - 0.4 K/UL    ABS. BASOPHILS 0.0 0.0 - 0.1 K/UL    ABS. IMM. GRANS. 0.0 K/UL    DF MANUAL      RBC COMMENTS NORMOCYTIC, NORMOCHROMIC     METABOLIC PANEL, COMPREHENSIVE    Collection Time: 07/19/20  4:13 AM   Result Value Ref Range    Sodium 138 136 - 145 mmol/L    Potassium 3.6 3.5 - 5.1 mmol/L    Chloride 104 97 - 108 mmol/L    CO2 28 21 - 32 mmol/L    Anion gap 6 5 - 15 mmol/L    Glucose 222 (H) 65 - 100 mg/dL    BUN 34 (H) 6 - 20 MG/DL    Creatinine 2.08 (H) 0.55 - 1.02 MG/DL    BUN/Creatinine ratio 16 12 - 20      GFR est AA 33 (L) >60 ml/min/1.73m2    GFR est non-AA 27 (L) >60 ml/min/1.73m2    Calcium 8.5 8.5 - 10.1 MG/DL    Bilirubin, total 0.3 0.2 - 1.0 MG/DL    ALT (SGPT) 60 12 - 78 U/L    AST (SGOT) 28 15 - 37 U/L    Alk.  phosphatase 94 45 - 117 U/L    Protein, total 7.3 6.4 - 8.2 g/dL    Albumin 3.4 (L) 3.5 - 5.0 g/dL    Globulin 3.9 2.0 - 4.0 g/dL    A-G Ratio 0.9 (L) 1.1 - 2.2     TROPONIN I    Collection Time: 07/19/20  4:13 AM   Result Value Ref Range    Troponin-I, Qt. <0.05 <0.05 ng/mL   GLUCOSE, POC    Collection Time: 07/19/20  8:44 AM   Result Value Ref Range    Glucose (POC) 249 (H) 65 - 100 mg/dL    Performed by Gypsy Brown        Medications reviewed  Current Facility-Administered Medications   Medication Dose Route Frequency    insulin lispro (HUMALOG) injection   SubCUTAneous AC&HS    glucose chewable tablet 16 g  4 Tab Oral PRN    dextrose (D50W) injection syrg 12.5-25 g  12.5-25 g IntraVENous PRN    glucagon (GLUCAGEN) injection 1 mg  1 mg IntraMUSCular PRN    sodium chloride (NS) flush 5-40 mL  5-40 mL IntraVENous Q8H    sodium chloride (NS) flush 5-40 mL  5-40 mL IntraVENous PRN    acetaminophen (TYLENOL) tablet 650 mg  650 mg Oral Q6H PRN    naloxone (NARCAN) injection 0.4 mg  0.4 mg IntraVENous PRN    heparin (porcine) injection 5,000 Units  5,000 Units SubCUTAneous Q8H    amLODIPine (NORVASC) tablet 5 mg  5 mg Oral DAILY       Care Plan discussed with: Patient/Family and Nurse    Total time spent with patient and review of records: 30 minutes.     Amie Munoz MD

## 2020-07-19 NOTE — CONSULTS
HISTORY OF PRESENTING ILLNESS      Ilene Kerr is a 37 y.o. female with hypertension, diabetes mellitus presenting with hypertensive emergency for which IV anti-hypertensive drugs were administered with resultant improvement of SBP. She reported being non-compliant with her outpatient Rx of losartan. Complained of headache/blurred vision/dizziness and chest pain (including after BP better controlled). She has also experienced PUENTE, fatigue with exertion over the past 1-2 weeks. She denies history of prior renal disease. She does have a history of indigestion however reports her GERD symptoms are quite different in quality from her recent left-sided chest discomfort.  Her last episode of chest pain was this AM.        ACTIVE PROBLEM LIST     Patient Active Problem List    Diagnosis Date Noted    Hypertensive emergency 07/19/2020    Essential hypertension     Diabetes ([facility]     Headache     Dizziness     MIKE (acute kidney injury) ([facility] [facility]     Leukocytosis     Vision abnormalities     Pregnant 04/13/2017    Pregnancy 04/08/2017           MEDICATIONS     Current Facility-Administered Medications   Medication Dose Route Frequency    insulin lispro (HUMALOG) injection   SubCUTAneous AC&HS    glucose chewable tablet 16 g  4 Tab Oral PRN    dextrose (D50W) injection syrg 12.5-25 g  12.5-25 g IntraVENous PRN    glucagon (GLUCAGEN) injection 1 mg  1 mg IntraMUSCular PRN    sodium chloride (NS) flush 5-40 mL  5-40 mL IntraVENous Q8H    sodium chloride (NS) flush 5-40 mL  5-40 mL IntraVENous PRN    acetaminophen (TYLENOL) tablet 650 mg  650 mg Oral Q6H PRN    naloxone (NARCAN) injection 0.4 mg  0.4 mg IntraVENous PRN    heparin (porcine) injection 5,000 Units  5,000 Units SubCUTAneous Q8H    labetaloL (NORMODYNE;TRANDATE) 20 mg/4 mL (5 mg/mL) injection 20 mg  20 mg IntraVENous Q6H PRN           PAST MEDICAL HISTORY     Past Medical History:   Diagnosis Date    [facility] (acute [facility]) ([facility] [facility]     Asthma     Diabetes ([facility]     gestational diabetes    Essential hypertension     seen today for elevated blood pressure    Leukocytosis     Staph aureus infection 2006    over abdomen           PAST SURGICAL HISTORY     Past Surgical History:   Procedure Laterality Date    BREAST SURGERY PROCEDURE UNLISTED      HX BREAST REDUCTION      HX CHOLECYSTECTOMY      HX TONSILLECTOMY            ALLERGIES     Allergies   Allergen Reactions    Biaxin [Clarithromycin] Hives    Chlorzoxazone Hives    Keflex [Cephalexin] Hives    Penicillins Hives          FAMILY HISTORY     No family history on file. negative for cardiac disease       SOCIAL HISTORY     Social History     Socioeconomic History    Marital status: SINGLE     Spouse name: Not on file    Number of children: Not on file    Years of education: Not on file    Highest education level: Not on file   Tobacco Use    Smoking status: Never Smoker   Substance and Sexual Activity    Alcohol use: No    Drug use: No    Sexual activity: Yes     Partners: Male         MEDICATIONS     Current Facility-Administered Medications   Medication Dose Route Frequency    insulin lispro (HUMALOG) injection   SubCUTAneous AC&HS    glucose chewable tablet 16 g  4 Tab Oral PRN    dextrose (D50W) injection syrg 12.5-25 g  12.5-25 g IntraVENous PRN    glucagon (GLUCAGEN) injection 1 mg  1 mg IntraMUSCular PRN    sodium chloride (NS) flush 5-40 mL  5-40 mL IntraVENous Q8H    sodium chloride (NS) flush 5-40 mL  5-40 mL IntraVENous PRN    acetaminophen (TYLENOL) tablet 650 mg  650 mg Oral Q6H PRN    naloxone (NARCAN) injection 0.4 mg  0.4 mg IntraVENous PRN    heparin (porcine) injection 5,000 Units  5,000 Units SubCUTAneous Q8H    labetaloL (NORMODYNE;TRANDATE) 20 mg/4 mL (5 mg/mL) injection 20 mg  20 mg IntraVENous Q6H PRN       I have reviewed the nurses notes, vitals, problem list, allergy list, medical history, family, social history and medications.        REVIEW OF SYMPTOMS      General: Pt denies excessive weight gain or loss. Pt is able to conduct ADL's  HEENT: Denies blurred vision, headaches, hearing loss, epistaxis and difficulty swallowing. Respiratory: Denies cough, congestion, shortness of breath, PUENTE, wheezing or stridor. Cardiovascular: Denies precordial pain, palpitations, edema or PND  Gastrointestinal: Denies poor appetite, indigestion, abdominal pain or blood in stool  Genitourinary: Denies hematuria, dysuria, increased urinary frequency  Musculoskeletal: Denies joint pain or swelling from muscles or joints  Neurologic: Denies tremor, paresthesias, headache, or sensory motor disturbance  Psychiatric: Denies confusion, insomnia, depression  Integumentray: Denies rash, itching or ulcers. Hematologic: Denies easy bruising, bleeding       PHYSICAL EXAMINATION      Vitals:    07/19/20 0941 07/19/20 1000 07/19/20 1044 07/19/20 1100   BP: (!) 157/97 136/77 160/77 146/87   Pulse: 63 72 64 (!) 59   Resp: 12 24 17 16   Temp:    98.1 °F (36.7 °C)   SpO2: 92% 99% 98% 97%   Weight:       Height:         General: Well developed, in no acute distress. HEENT: No jaundice, oral mucosa moist, no oral ulcers  Neck: Supple, no stiffness, no lymphadenopathy, supple  Heart:  Normal S1/S2 negative S3 or S4. Regular, no murmur, gallop or rub, no jugular venous distention  Respiratory: Clear bilaterally x 4, no wheezing or rales  Abdomen:   Soft, non-tender, bowel sounds are active.   Extremities:  No edema, normal cap refill, no cyanosis. Musculoskeletal: No clubbing, no deformities  Neuro: A&Ox3, speech clear, gait stable, cooperative, no focal neurologic deficits  Skin: Skin color is normal. No rashes or lesions.  Non diaphoretic, moist.  Vascular: 2+ pulses symmetric in all extremities       DIAGNOSTIC DATA      EKG: sinus rhythm with LVH       LABORATORY DATA      Lab Results   Component Value Date/Time    WBC 17.8 (H) 07/19/2020 04:13 AM    HGB 12.1 07/19/2020 04:13 AM HCT 37.3 07/19/2020 04:13 AM    PLATELET 650 46/27/0790 04:13 AM    MCV 84.8 07/19/2020 04:13 AM      Lab Results   Component Value Date/Time    Sodium 138 07/19/2020 04:13 AM    Potassium 3.6 07/19/2020 04:13 AM    Chloride 104 07/19/2020 04:13 AM    CO2 28 07/19/2020 04:13 AM    Anion gap 6 07/19/2020 04:13 AM    Glucose 222 (H) 07/19/2020 04:13 AM    BUN 34 (H) 07/19/2020 04:13 AM    Creatinine 2.08 (H) 07/19/2020 04:13 AM    BUN/Creatinine ratio 16 07/19/2020 04:13 AM    GFR est AA 33 (L) 07/19/2020 04:13 AM    GFR est non-AA 27 (L) 07/19/2020 04:13 AM    Calcium 8.5 07/19/2020 04:13 AM    Bilirubin, total 0.3 07/19/2020 04:13 AM    Alk. phosphatase 94 07/19/2020 04:13 AM    Protein, total 7.3 07/19/2020 04:13 AM    Albumin 3.4 (L) 07/19/2020 04:13 AM    Globulin 3.9 07/19/2020 04:13 AM    A-G Ratio 0.9 (L) 07/19/2020 04:13 AM    ALT (SGPT) 60 07/19/2020 04:13 AM           ASSESSMENT      1. Hypertensive emergency  2. Diabetes mellitus  3. Renal insufficiency  4. Obesity  5. Headache         PLAN     Follow up echocardiogram. Stress echocardiogram tomorrow (patient has anxiety about performing these as OP). Would hold off on restarting losartan given acute renal insufficiency; start norvasc 5 mg daily. Thank you, Thaddeus Naqvi NP and Dr. Alba Dance for involving me in the care of this extraordinarily pleasant female. Please do not hesitate to contact me for further questions/concerns.          Angie Whiteside MD  Cardiac Electrophysiology / Cardiology    [facility]  [address] [facility], [facility], [address] [place], [place] [address]Lisa [address] [place] [address]  [phone] / [phone] Fax   [phone] / [phone] Fax

## 2020-07-19 NOTE — PROGRESS NOTES
0942-Chart accessed for review due to voiced concern regarding this patient with HTNsive urgency with symptoms. Pt b/p severely elevated; 203/90. Has received IV Labetalol, also has norvasc scheduled. Pt normally takes losartan at home but has not taken in months. Head CT negative; MRI brain ordered. Troponin negative; repeat sent. Will follow closely.  Marcus VILLASENOR

## 2020-07-20 ENCOUNTER — APPOINTMENT (OUTPATIENT)
Dept: NON INVASIVE DIAGNOSTICS | Age: 35
End: 2020-07-20
Attending: NURSE PRACTITIONER
Payer: COMMERCIAL

## 2020-07-20 VITALS
OXYGEN SATURATION: 98 % | RESPIRATION RATE: 17 BRPM | TEMPERATURE: 98.7 F | HEART RATE: 77 BPM | BODY MASS INDEX: 45.17 KG/M2 | DIASTOLIC BLOOD PRESSURE: 88 MMHG | SYSTOLIC BLOOD PRESSURE: 138 MMHG | WEIGHT: 281.09 LBS | HEIGHT: 66 IN

## 2020-07-20 LAB
ALBUMIN SERPL-MCNC: 3.3 G/DL (ref 3.5–5)
ALBUMIN/GLOB SERPL: 0.9 {RATIO} (ref 1.1–2.2)
ALP SERPL-CCNC: 89 U/L (ref 45–117)
ALT SERPL-CCNC: 47 U/L (ref 12–78)
ANION GAP SERPL CALC-SCNC: 7 MMOL/L (ref 5–15)
AST SERPL-CCNC: 23 U/L (ref 15–37)
BASOPHILS # BLD: 0 K/UL (ref 0–0.1)
BASOPHILS NFR BLD: 0 % (ref 0–1)
BILIRUB SERPL-MCNC: 0.6 MG/DL (ref 0.2–1)
BUN SERPL-MCNC: 21 MG/DL (ref 6–20)
BUN/CREAT SERPL: 13 (ref 12–20)
CALCIUM SERPL-MCNC: 8.7 MG/DL (ref 8.5–10.1)
CHLORIDE SERPL-SCNC: 103 MMOL/L (ref 97–108)
CO2 SERPL-SCNC: 26 MMOL/L (ref 21–32)
COMMENT, HOLDF: NORMAL
CORTIS AM PEAK SERPL-MCNC: 15.3 UG/DL (ref 4.3–22.45)
CREAT SERPL-MCNC: 1.68 MG/DL (ref 0.55–1.02)
DIFFERENTIAL METHOD BLD: ABNORMAL
ECHO AR MAX VEL PISA: 450.76 CENTIMETER/SECOND
ECHO AV REGURGITANT PHT: 1.11 S
ECHO TV REGURGITANT MAX VELOCITY: 264.03 CM/S
ECHO TV REGURGITANT PEAK GRADIENT: 27.89 MMHG
EOSINOPHIL # BLD: 0.1 K/UL (ref 0–0.4)
EOSINOPHIL NFR BLD: 1 % (ref 0–7)
ERYTHROCYTE [DISTWIDTH] IN BLOOD BY AUTOMATED COUNT: 14.1 % (ref 11.5–14.5)
EST. AVERAGE GLUCOSE BLD GHB EST-MCNC: 194 MG/DL
GLOBULIN SER CALC-MCNC: 3.7 G/DL (ref 2–4)
GLUCOSE BLD STRIP.AUTO-MCNC: 181 MG/DL (ref 65–100)
GLUCOSE BLD STRIP.AUTO-MCNC: 218 MG/DL (ref 65–100)
GLUCOSE SERPL-MCNC: 166 MG/DL (ref 65–100)
HBA1C MFR BLD: 8.4 % (ref 4–5.6)
HCT VFR BLD AUTO: 36.2 % (ref 35–47)
HGB BLD-MCNC: 11.7 G/DL (ref 11.5–16)
IMM GRANULOCYTES # BLD AUTO: 0 K/UL
IMM GRANULOCYTES NFR BLD AUTO: 0 %
LYMPHOCYTES # BLD: 5.6 K/UL (ref 0.8–3.5)
LYMPHOCYTES NFR BLD: 38 % (ref 12–49)
MAGNESIUM SERPL-MCNC: 2 MG/DL (ref 1.6–2.4)
MCH RBC QN AUTO: 27.5 PG (ref 26–34)
MCHC RBC AUTO-ENTMCNC: 32.3 G/DL (ref 30–36.5)
MCV RBC AUTO: 85 FL (ref 80–99)
MONOCYTES # BLD: 0.3 K/UL (ref 0–1)
MONOCYTES NFR BLD: 2 % (ref 5–13)
NEUTS SEG # BLD: 8.8 K/UL (ref 1.8–8)
NEUTS SEG NFR BLD: 59 % (ref 32–75)
NRBC # BLD: 0 K/UL (ref 0–0.01)
NRBC BLD-RTO: 0 PER 100 WBC
PHOSPHATE SERPL-MCNC: 3.6 MG/DL (ref 2.6–4.7)
PLATELET # BLD AUTO: 275 K/UL (ref 150–400)
PMV BLD AUTO: 11 FL (ref 8.9–12.9)
POTASSIUM SERPL-SCNC: 3.8 MMOL/L (ref 3.5–5.1)
PROT SERPL-MCNC: 7 G/DL (ref 6.4–8.2)
RBC # BLD AUTO: 4.26 M/UL (ref 3.8–5.2)
RBC MORPH BLD: ABNORMAL
SAMPLES BEING HELD,HOLD: NORMAL
SERVICE CMNT-IMP: ABNORMAL
SERVICE CMNT-IMP: ABNORMAL
SODIUM SERPL-SCNC: 136 MMOL/L (ref 136–145)
STRESS ANGINA INDEX: 1
STRESS BASELINE DIAS BP: 90 MMHG
STRESS BASELINE HR: 71 BPM
STRESS BASELINE SYS BP: 144 MMHG
STRESS ESTIMATED WORKLOAD: 9.8 METS
STRESS EXERCISE DUR MIN: NORMAL
STRESS PEAK DIAS BP: 90 MMHG
STRESS PEAK SYS BP: 230 MMHG
STRESS PERCENT HR ACHIEVED: 92 %
STRESS POST PEAK HR: 169 BPM
STRESS RATE PRESSURE PRODUCT: NORMAL BPM*MMHG
STRESS SR DUKE TREADMILL SCORE: -4
STRESS ST DEPRESSION: 0 MM
STRESS ST ELEVATION: 0 MM
STRESS TARGET HR: 184 BPM
TSH SERPL DL<=0.05 MIU/L-ACNC: 3.36 UIU/ML (ref 0.36–3.74)
WBC # BLD AUTO: 14.8 K/UL (ref 3.6–11)

## 2020-07-20 PROCEDURE — 82088 ASSAY OF ALDOSTERONE: CPT

## 2020-07-20 PROCEDURE — 74011250637 HC RX REV CODE- 250/637: Performed by: NURSE PRACTITIONER

## 2020-07-20 PROCEDURE — 80053 COMPREHEN METABOLIC PANEL: CPT

## 2020-07-20 PROCEDURE — 36415 COLL VENOUS BLD VENIPUNCTURE: CPT

## 2020-07-20 PROCEDURE — 96372 THER/PROPH/DIAG INJ SC/IM: CPT

## 2020-07-20 PROCEDURE — 74011636637 HC RX REV CODE- 636/637: Performed by: INTERNAL MEDICINE

## 2020-07-20 PROCEDURE — 74011250636 HC RX REV CODE- 250/636: Performed by: INTERNAL MEDICINE

## 2020-07-20 PROCEDURE — 85025 COMPLETE CBC W/AUTO DIFF WBC: CPT

## 2020-07-20 PROCEDURE — 74011250637 HC RX REV CODE- 250/637: Performed by: INTERNAL MEDICINE

## 2020-07-20 PROCEDURE — 84100 ASSAY OF PHOSPHORUS: CPT

## 2020-07-20 PROCEDURE — 84443 ASSAY THYROID STIM HORMONE: CPT

## 2020-07-20 PROCEDURE — 83835 ASSAY OF METANEPHRINES: CPT

## 2020-07-20 PROCEDURE — 74011250636 HC RX REV CODE- 250/636: Performed by: FAMILY MEDICINE

## 2020-07-20 PROCEDURE — 82533 TOTAL CORTISOL: CPT

## 2020-07-20 PROCEDURE — 82962 GLUCOSE BLOOD TEST: CPT

## 2020-07-20 PROCEDURE — 74011250637 HC RX REV CODE- 250/637: Performed by: FAMILY MEDICINE

## 2020-07-20 PROCEDURE — 99218 HC RM OBSERVATION: CPT

## 2020-07-20 PROCEDURE — 83735 ASSAY OF MAGNESIUM: CPT

## 2020-07-20 PROCEDURE — C8930 TTE W OR W/O CONTR, CONT ECG: HCPCS

## 2020-07-20 PROCEDURE — 83036 HEMOGLOBIN GLYCOSYLATED A1C: CPT

## 2020-07-20 PROCEDURE — 84244 ASSAY OF RENIN: CPT

## 2020-07-20 RX ORDER — AMLODIPINE BESYLATE 10 MG/1
10 TABLET ORAL DAILY
Qty: 30 TAB | Refills: 1 | Status: SHIPPED | OUTPATIENT
Start: 2020-07-21 | End: 2020-09-21

## 2020-07-20 RX ORDER — AMLODIPINE BESYLATE 5 MG/1
5 TABLET ORAL ONCE
Status: COMPLETED | OUTPATIENT
Start: 2020-07-20 | End: 2020-07-20

## 2020-07-20 RX ORDER — AMLODIPINE BESYLATE 5 MG/1
10 TABLET ORAL DAILY
Status: DISCONTINUED | OUTPATIENT
Start: 2020-07-21 | End: 2020-07-20 | Stop reason: HOSPADM

## 2020-07-20 RX ADMIN — HEPARIN SODIUM 5000 UNITS: 5000 INJECTION INTRAVENOUS; SUBCUTANEOUS at 03:21

## 2020-07-20 RX ADMIN — HEPARIN SODIUM 5000 UNITS: 5000 INJECTION INTRAVENOUS; SUBCUTANEOUS at 09:20

## 2020-07-20 RX ADMIN — INSULIN LISPRO 2 UNITS: 100 INJECTION, SOLUTION INTRAVENOUS; SUBCUTANEOUS at 09:16

## 2020-07-20 RX ADMIN — Medication 10 ML: at 12:08

## 2020-07-20 RX ADMIN — AMLODIPINE BESYLATE 5 MG: 5 TABLET ORAL at 09:20

## 2020-07-20 RX ADMIN — Medication 10 ML: at 05:22

## 2020-07-20 RX ADMIN — ONDANSETRON 4 MG: 4 TABLET, ORALLY DISINTEGRATING ORAL at 14:17

## 2020-07-20 RX ADMIN — INSULIN LISPRO 3 UNITS: 100 INJECTION, SOLUTION INTRAVENOUS; SUBCUTANEOUS at 12:07

## 2020-07-20 RX ADMIN — PERFLUTREN 2 ML: 6.52 INJECTION, SUSPENSION INTRAVENOUS at 11:30

## 2020-07-20 RX ADMIN — ACETAMINOPHEN 650 MG: 325 TABLET ORAL at 12:08

## 2020-07-20 RX ADMIN — AMLODIPINE BESYLATE 5 MG: 5 TABLET ORAL at 14:17

## 2020-07-20 NOTE — PROGRESS NOTES
SHIFT CHANGE:  1930 Bedside and Verbal shift change report given to Taisha VILLASENOR (oncoming nurse) by [facility] (offgoing nurse). Report included the following information SBAR, Kardex, MAR and Recent Results. SHIFT SUMMARY:  2000  Troponin blood draw sent to lab.  0400  Blood work sent to lab. END OF SHIFT REPORT:  0730  Bedside and Verbal shift change report given to [address] [place] [address] (oncoming nurse) by Lyndsay Dunlap (offgoing nurse). Report included the following information SBAR, Kardex, MAR and Recent Results.

## 2020-07-20 NOTE — PROGRESS NOTES
Cardiology Progress Note                              [address] [facility]. [address], [place], [address] [facility]                                 Phone [phone]; Fax [phone]        2020 9:23 AM     Admit Date:           2020  Admit Diagnosis:  Hypertensive emergency [I16.1]  :          1985   MRN:          [id number]       Impression Plan/Recommendation   1. Hypertensive emergency  2. Diabetes mellitus  3. Renal insufficiency  4. Obesity  5. Probable sleep apnea               · BP controlled - continue norvasc  · Avoid AV db agents w bradycardia  ·  echo shows pEF w mild AR  · Renal function trending down  · Troponin negative   · Stress echo this AM- patient agreeable          CARDIOLOGY ATTENDING  Patient personally seen and examined. All the elements of history and examination were personally performed. Assessment and plan was discussed and agree as written above    She denies any chest pain. BP is better today but still mildly high. Hrt RRR without murmur. Lungs clear. Abd soft, NT. Her stress echo was normal.   We discussed goal was for risk factor control. Norvasc increased to 10 mg on discharge. Will follow-up as outpatient.      Jerome Trammell MD, [facility] [place]            07 -  190  In: -   Out: 600 [Urine:500]    Last 3 Recorded Weights in this Encounter    20 0404 20 1355   Weight: 270 lb (122.5 kg) 281 lb 1.4 oz (127.5 kg)          190 -  0700  In: 18 [P.O.:1560]  Out:  [Urine:]    SUBJECTIVE               May Bush denies palpitations, irregular heart beat, SOB, chest pain or LE edema  Last time she had CP was yesterday when BP was high  Abdominal cramping much better  No lightheadedness or dizziness          Current Facility-Administered Medications   Medication Dose Route Frequency    insulin lispro (HUMALOG) injection   SubCUTAneous AC&HS    glucose chewable tablet 16 g  4 Tab Oral PRN    dextrose (D50W) injection syrg 12.5-25 g  12.5-25 g IntraVENous PRN    glucagon (GLUCAGEN) injection 1 mg  1 mg IntraMUSCular PRN    sodium chloride (NS) flush 5-40 mL  5-40 mL IntraVENous Q8H    sodium chloride (NS) flush 5-40 mL  5-40 mL IntraVENous PRN    acetaminophen (TYLENOL) tablet 650 mg  650 mg Oral Q6H PRN    naloxone (NARCAN) injection 0.4 mg  0.4 mg IntraVENous PRN    heparin (porcine) injection 5,000 Units  5,000 Units SubCUTAneous Q8H    labetaloL (NORMODYNE;TRANDATE) 20 mg/4 mL (5 mg/mL) injection 20 mg  20 mg IntraVENous Q6H PRN    amLODIPine (NORVASC) tablet 5 mg  5 mg Oral DAILY    ondansetron (ZOFRAN ODT) tablet 4 mg  4 mg Oral Q6H PRN      OBJECTIVE               Intake/Output Summary (Last 24 hours) at 7/20/2020 0923  Last data filed at 7/20/2020 0803  Gross per 24 hour   Intake 1560 ml   Output 2050 ml   Net -490 ml       Review of Systems - History obtained from the patient AS PER  HPI    Telemetry SB-NSR    PHYSICAL EXAM        Visit Vitals  /78 (BP 1 Location: Right arm, BP Patient Position: Head of bed elevated (Comment degrees))   Pulse 70   Temp 98.6 °F (37 °C)   Resp 18   Ht 5' 6\" (1.676 m)   Wt 281 lb 1.4 oz (127.5 kg)   SpO2 98%   BMI 45.37 kg/m²       Gen: Well-developed, obese, in no acute distress  alert and oriented x 3  HEENT:  Pink conjunctivae, Hearing grossly normal.No scleral icterus or conjunctival, moist mucous membranes  Neck: Supple,No JVD  Resp: No accessory muscle use, Clear breath sounds, No rales or rhonchi  Card: Regular Rate,Rythm, No murmurs, rubs or gallop. No thrills.    GI:          soft, non-tender   MSK: No cyanosis or clubbing, good capillary refill  Skin: No rashes or ulcers, no bruising  Neuro:  Cranial nerves are grossly intact, moving all four extremities, no focal deficit, follows commands appropriately  Psych:  Good insight, oriented to person, place and time, alert, Nml Affect  LE: No edema       DATA REVIEW            Laboratory and Imaging have been reviewed by me and are notable for  Recent Labs     07/19/20  1950 07/19/20  0937 07/19/20  0413   TROIQ <0.05 <0.05 <0.05     Recent Labs     07/20/20  0327 07/19/20  0413    138   K 3.8 3.6   CO2 26 28   BUN 21* 34*   CREA 1.68* 2.08*   * 222*   PHOS 3.6  --    MG 2.0  --    WBC 14.8* 17.8*   HGB 11.7 12.1   HCT 36.2 37.3    [address], [place]

## 2020-07-20 NOTE — PROGRESS NOTES
[address] July 20, 2020       RE: Emani Grimes      To Whom It May Concern,    This is to certify that Emani Grimes was hospitalized on 7/19/20 and may return to work on 7/20/20. Please feel free to contact my office if you have any questions or concerns. Thank you for your assistance in this matter.       Sincerely,  Dr. Nola Greene

## 2020-07-20 NOTE — PROGRESS NOTES
Daily Progress Note: 7/20/2020  Wally Silverman NP    Assessment/Plan:   Hypertensive emergency: has not taken her regular antihypertensive (losartan) for months. SBP up to 240s on presentation, BP improved after IV hydralazine and labetalol. Caution re: rapid correction of HTN.   - Cardiology consult. - Check EKG. - Stress ECHO today  - TSH normal  - Amlodipine     Headache: associated with dizziness, vision abnormalities. Head CT showed no acute process. - Brain MRI neg  - Neurology consult. - Frequent neuro checks. - Tylenol PRN     Diabetes ([facility] [address] (): had gestational diabetes and appeared surprised to learn that her BG is up. - Start SSI, may need scheduled insulin. -  A1c pending     MIKE (acute kidney injury):   - send UA, urine 'lytes. - Renal US to rule out hydronephrosis. - Nephrology consult     Leukocytosis (): possible reactive process. - CXR negative. - Send UA; may need abx.   - Follow closely     CP  - Serial troponins negative  - Repeat EKG normal     Problem List:  Problem List as of 7/20/2020 Date Reviewed: 7/19/2020          Codes Class Noted - Resolved    Essential hypertension ICD-10-CM: I10  ICD-9-CM: 401.9  Unknown - Present        Diabetes ([facility] [address] ICD-10-CM: E11.9  ICD-9-CM: 250.00  Unknown - Present    Overview Signed 7/19/2020  5:59 AM by Ever Smith MD     gestational diabetes             Headache ICD-10-CM: R51  ICD-9-CM: 784.0  Unknown - Present        Dizziness ICD-10-CM: R42  ICD-9-CM: 780.4  Unknown - Present        MIKE (acute kidney injury) ([facility] [address] ICD-10-CM: N17.9  ICD-9-CM: 228. 9  Unknown - Present        Leukocytosis ICD-10-CM: D72.829  ICD-9-CM: 288.60  Unknown - Present        Hypertensive emergency ICD-10-CM: I16.1  ICD-9-CM: 401.9  7/19/2020 - Present        Vision abnormalities ICD-10-CM: H53.9  ICD-9-CM: 368.9  Unknown - Present        Pregnant ICD-10-CM: Z34.90  ICD-9-CM: V22.2  4/13/2017 - Present        Pregnancy ICD-10-CM: Z34.90  ICD-9-CM: V22.2  2017 - Present              HPI:    Ms. Rios Andres is a 37 y.o. female w/ hx of HTN, DM who presents with headache. Started ~ 2 days ago, frontal, severe, associated with nausea and visual changes. Thinks her BP is high as she has had similar HAs prior related to HTN. No CP, SOB.     ED workup showed markedly elevated BP close to 802 systolic. Head CT negative for ICH.       Ms. Rios Andres is admitted for further evaluation and management. (Dr Ailyn Zaragoza)    : She is c/o left sided CP radiating across her chest. /90. She has recently been on steroids for asthma exac. Checking A1c as her BS is up. She has a history of gestational diabetes. Will get EKG and repeat troponin and give Labetalol. Neuro, Cards, and Renal consults pending. Still with visual changes and h/a. MRI pending. CT head in ER neg for bleed. : MRI neg. BP is better with meds. For stress ECHO today. BS are elevated and A1c is pending. Creatinine has improved. She was c/o abd discomfort and cramping yesterday. Felt like a jacky horse cramp. Abd Xray negative. 2p: Stress ECHO ok. Norvasc increased to 10 mg. Cleared by cards for d/c. Review of Systems:   A comprehensive review of systems was negative except for that written in the HPI. Objective:   Physical Exam:     Visit Vitals  /81 (BP 1 Location: Right arm, BP Patient Position: At rest)   Pulse 64   Temp 97.7 °F (36.5 °C)   Resp 12   Ht 5' 6\" (1.676 m)   Wt 281 lb 1.4 oz (127.5 kg)   SpO2 100%   BMI 45.37 kg/m²      O2 Device: Room air    Temp (24hrs), Av.1 °F (36.7 °C), Min:97.7 °F (36.5 °C), Max:98.5 °F (36.9 °C)    1901 -  0700  In: 480 [P.O.:480]  Out: 600 [Urine:600]   701 - 1900  In: 1080 [P.O.:1080]  Out: 1450 [Urine:1450]    General:  Alert, cooperative, no distress, appears stated age. Head:  Normocephalic, without obvious abnormality, atraumatic. Eyes:  Conjunctivae/corneas clear. PERRL, EOMs intact.    Nose: Nares normal. Septum midline. Mucosa normal. No drainage or sinus tenderness. Throat: Lips, mucosa, and tongue normal. Teeth and gums normal.   Neck: Supple, symmetrical, trachea midline, no adenopathy, thyroid: no enlargement/tenderness/nodules, no carotid bruit and no JVD. Back:   Symmetric, no curvature. ROM normal. No CVA tenderness. Lungs:   Clear to auscultation bilaterally. Chest wall:  No tenderness or deformity. Heart:  Regular rate and rhythm, S1, S2 normal, no murmur, click, rub or gallop. Abdomen:   Soft, non-tender. Bowel sounds normal. No masses,  No organomegaly. Extremities: Extremities normal, atraumatic, no cyanosis or edema. No calf tenderness or cords. Pulses: 2+ and symmetric all extremities. Skin: Skin color, texture, turgor normal. No rashes or lesions   Neurologic: CNII-XII intact. Alert and oriented X 3. Fine motor of hands and fingers normal.   equal.  No cogwheeling or rigidity. Gait not tested at this time. Sensation grossly normal to touch. Gross motor of extremities normal.       Data Review:    IMPRESSION: CT Head   No acute intracranial abnormality. IMPRESSION: MRI Head  No acute intracranial abnormalities. Interpretation Summary ECHO    · Normal cavity size, systolic function (ejection fraction normal) and diastolic function. Mildly increased wall thickness. Estimated left ventricular ejection fraction is 55 - 60%. No regional wall motion abnormality noted. · Mild aortic valve regurgitation is present. · Mildly elevated Aortic valve peak velocity without stenosis.        Recent Days:  Recent Labs     07/20/20  0327 07/19/20  0413   WBC 14.8* 17.8*   HGB 11.7 12.1   HCT 36.2 37.3    307     Recent Labs     07/20/20  0327 07/19/20  0413    138   K 3.8 3.6    104   CO2 26 28   * 222*   BUN 21* 34*   CREA 1.68* 2.08*   CA 8.7 8.5   MG 2.0  --    PHOS 3.6  --    ALB 3.3* 3.4*   TBILI 0.6 0.3   ALT 47 60     No results for input(s): PH, PCO2, PO2, HCO3, FIO2 in the last 72 hours.     24 Hour Results:  Recent Results (from the past 24 hour(s))   EKG, 12 LEAD, INITIAL    Collection Time: 07/19/20  8:08 AM   Result Value Ref Range    Ventricular Rate 69 BPM    Atrial Rate 69 BPM    P-R Interval 182 ms    QRS Duration 86 ms    Q-T Interval 454 ms    QTC Calculation (Bezet) 486 ms    Calculated P Axis 49 degrees    Calculated R Axis -7 degrees    Calculated T Axis 2 degrees    Diagnosis       Normal sinus rhythm  Voltage criteria for left ventricular hypertrophy  Prolonged QT  Abnormal ECG  No previous ECGs available  Confirmed by Parvez Mckeon MD. ([id number]) on 7/19/2020 6:04:14 PM     GLUCOSE, POC    Collection Time: 07/19/20  8:44 AM   Result Value Ref Range    Glucose (POC) 249 (H) 65 - 100 mg/dL    Performed by Alvarez Weller    SODIUM, UR, RANDOM    Collection Time: 07/19/20  9:37 AM   Result Value Ref Range    Sodium,urine random 118 MMOL/L   CREATININE, UR, RANDOM    Collection Time: 07/19/20  9:37 AM   Result Value Ref Range    Creatinine, urine 49.00 mg/dL   URINALYSIS W/ REFLEX CULTURE    Collection Time: 07/19/20  9:37 AM    Specimen: Urine   Result Value Ref Range    Color YELLOW/STRAW      Appearance CLEAR CLEAR      Specific gravity 1.012 1.003 - 1.030      pH (UA) 6.0 5.0 - 8.0      Protein 30 (A) NEG mg/dL    Glucose 250 (A) NEG mg/dL    Ketone Negative NEG mg/dL    Bilirubin Negative NEG      Blood Negative NEG      Urobilinogen 0.2 0.2 - 1.0 EU/dL    Nitrites Negative NEG      Leukocyte Esterase Negative NEG      WBC 0-4 0 - 4 /hpf    RBC 0-5 0 - 5 /hpf    Epithelial cells FEW FEW /lpf    Bacteria Negative NEG /hpf    UA:UC IF INDICATED CULTURE NOT INDICATED BY UA RESULT CNI      Hyaline cast 0-2 0 - 5 /lpf   TROPONIN I    Collection Time: 07/19/20  9:37 AM   Result Value Ref Range    Troponin-I, Qt. <0.05 <0.05 ng/mL   SAMPLES BEING HELD    Collection Time: 07/19/20  9:37 AM   Result Value Ref Range    SAMPLES BEING HELD 1UA COMMENT        Add-on orders for these samples will be processed based on acceptable specimen integrity and analyte stability, which may vary by analyte.    GLUCOSE, POC    Collection Time: 07/19/20 11:26 AM   Result Value Ref Range    Glucose (POC) 239 (H) 65 - 100 mg/dL    Performed by Erica Goodman    ECHO ADULT COMPLETE    Collection Time: 07/19/20  1:55 PM   Result Value Ref Range    IVSd 1.15 (A) 0.6 - 0.9 cm    LVIDd 4.98 3.9 - 5.3 cm    LVIDs 3.48 cm    LVOT d 1.99 cm    LVPWd 1.20 (A) 0.6 - 0.9 cm    LVOT Peak Gradient 6.85 mmHg    Left Ventricular Outflow Tract Mean Gradient 3.59 mmHg    LVOT SV 87.4 mL    LVOT Peak Velocity 130.87 cm/s    LVOT VTI 28.23 cm    RVIDd 3.41 cm    Left Atrium Major Axis 3.88 cm    LA Volume 69.97 22 - 52 mL    LA Area 4C 19.26 cm2    LA Vol 2C 74.18 (A) 22 - 52 mL    LA Vol 4C 52.59 (A) 22 - 52 mL    LA Volume DISK BP 66.37 22 - 52 mL    Right Atrial Area 4C 14.60 cm2    Aortic Valve Area by Continuity of Peak Velocity 2.07 cm2    Aortic Valve Area by Continuity of VTI 2.18 cm2    Aortic Regurgitant Pressure Half-time 1.06 s    AR Max Joseph 476.81 centimeter/second    AoV PG 15.37 mmHg    Aortic Valve Systolic Mean Gradient 5.09 mmHg    Aortic Valve Systolic Peak Velocity 797.22 cm/s    AoV VTI 40.16 cm    MV A Joseph 80.58 centimeter/second    Mitral Valve E Wave Deceleration Time 0.34 s    MV E Joseph 82.52 centimeter/second    LV E' Lateral Velocity 9.74 centimeter/second    LV E' Septal Velocity 6.95 centimeter/second    Mitral Valve Pressure Half-time 0.10 s    MVA (PHT) 2.22 cm2    Tapse 1.95 1.5 - 2.0 cm    Triscuspid Valve Regurgitation Peak Gradient 29.83 mmHg    TR Max Velocity 273.09 cm/s    AO ASC D 3.08 cm    Ao Root D 2.96 cm    LV Mass .5 67 - 162 g    LV Mass AL Index 97.6 43 - 95 g/m2    Left Atrium Minor Axis 1.68 cm    LA Vol Index 30.27 16 - 28 ml/m2    LA Vol Index 32.09 16 - 28 ml/m2    LA Vol Index 22.75 16 - 28 ml/m2    RADHA/BSA Pk Joseph 0.9 cm2/m2 RADHA/BSA VTI 0.9 cm2/m2   GLUCOSE, POC    Collection Time: 07/19/20  4:13 PM   Result Value Ref Range    Glucose (POC) 195 (H) 65 - 100 mg/dL    Performed by Gina Delgado    TROPONIN I    Collection Time: 07/19/20  7:50 PM   Result Value Ref Range    Troponin-I, Qt. <0.05 <0.05 ng/mL   GLUCOSE, POC    Collection Time: 07/19/20  9:46 PM   Result Value Ref Range    Glucose (POC) 185 (H) 65 - 100 mg/dL    Performed by Bertha Sumner    METABOLIC PANEL, COMPREHENSIVE    Collection Time: 07/20/20  3:27 AM   Result Value Ref Range    Sodium 136 136 - 145 mmol/L    Potassium 3.8 3.5 - 5.1 mmol/L    Chloride 103 97 - 108 mmol/L    CO2 26 21 - 32 mmol/L    Anion gap 7 5 - 15 mmol/L    Glucose 166 (H) 65 - 100 mg/dL    BUN 21 (H) 6 - 20 MG/DL    Creatinine 1.68 (H) 0.55 - 1.02 MG/DL    BUN/Creatinine ratio 13 12 - 20      GFR est AA 42 (L) >60 ml/min/1.73m2    GFR est non-AA 34 (L) >60 ml/min/1.73m2    Calcium 8.7 8.5 - 10.1 MG/DL    Bilirubin, total 0.6 0.2 - 1.0 MG/DL    ALT (SGPT) 47 12 - 78 U/L    AST (SGOT) 23 15 - 37 U/L    Alk. phosphatase 89 45 - 117 U/L    Protein, total 7.0 6.4 - 8.2 g/dL    Albumin 3.3 (L) 3.5 - 5.0 g/dL    Globulin 3.7 2.0 - 4.0 g/dL    A-G Ratio 0.9 (L) 1.1 - 2.2     CBC WITH AUTOMATED DIFF    Collection Time: 07/20/20  3:27 AM   Result Value Ref Range    WBC 14.8 (H) 3.6 - 11.0 K/uL    RBC 4.26 3.80 - 5.20 M/uL    HGB 11.7 11.5 - 16.0 g/dL    HCT 36.2 35.0 - 47.0 %    MCV 85.0 80.0 - 99.0 FL    MCH 27.5 26.0 - 34.0 PG    MCHC 32.3 30.0 - 36.5 g/dL    RDW 14.1 11.5 - 14.5 %    PLATELET 924 382 - 235 K/uL    MPV 11.0 8.9 - 12.9 FL    NRBC 0.0 0  WBC    ABSOLUTE NRBC 0.00 0.00 - 0.01 K/uL    NEUTROPHILS 59 32 - 75 %    LYMPHOCYTES 38 12 - 49 %    MONOCYTES 2 (L) 5 - 13 %    EOSINOPHILS 1 0 - 7 %    BASOPHILS 0 0 - 1 %    IMMATURE GRANULOCYTES 0 %    ABS. NEUTROPHILS 8.8 (H) 1.8 - 8.0 K/UL    ABS. LYMPHOCYTES 5.6 (H) 0.8 - 3.5 K/UL    ABS. MONOCYTES 0.3 0.0 - 1.0 K/UL    ABS.  EOSINOPHILS 0.1 0.0 - 0.4 K/UL    ABS. BASOPHILS 0.0 0.0 - 0.1 K/UL    ABS. IMM. GRANS. 0.0 K/UL    DF MANUAL      RBC COMMENTS NORMOCYTIC, NORMOCHROMIC     MAGNESIUM    Collection Time: 07/20/20  3:27 AM   Result Value Ref Range    Magnesium 2.0 1.6 - 2.4 mg/dL   PHOSPHORUS    Collection Time: 07/20/20  3:27 AM   Result Value Ref Range    Phosphorus 3.6 2.6 - 4.7 MG/DL   TSH 3RD GENERATION    Collection Time: 07/20/20  3:27 AM   Result Value Ref Range    TSH 3.36 0.36 - 3.74 uIU/mL   SAMPLES BEING HELD    Collection Time: 07/20/20  3:27 AM   Result Value Ref Range    SAMPLES BEING HELD 1LAV     COMMENT        Add-on orders for these samples will be processed based on acceptable specimen integrity and analyte stability, which may vary by analyte. Medications reviewed  Current Facility-Administered Medications   Medication Dose Route Frequency    insulin lispro (HUMALOG) injection   SubCUTAneous AC&HS    glucose chewable tablet 16 g  4 Tab Oral PRN    dextrose (D50W) injection syrg 12.5-25 g  12.5-25 g IntraVENous PRN    glucagon (GLUCAGEN) injection 1 mg  1 mg IntraMUSCular PRN    sodium chloride (NS) flush 5-40 mL  5-40 mL IntraVENous Q8H    sodium chloride (NS) flush 5-40 mL  5-40 mL IntraVENous PRN    acetaminophen (TYLENOL) tablet 650 mg  650 mg Oral Q6H PRN    naloxone (NARCAN) injection 0.4 mg  0.4 mg IntraVENous PRN    heparin (porcine) injection 5,000 Units  5,000 Units SubCUTAneous Q8H    labetaloL (NORMODYNE;TRANDATE) 20 mg/4 mL (5 mg/mL) injection 20 mg  20 mg IntraVENous Q6H PRN    amLODIPine (NORVASC) tablet 5 mg  5 mg Oral DAILY    ondansetron (ZOFRAN ODT) tablet 4 mg  4 mg Oral Q6H PRN       Care Plan discussed with: Patient/Family and Nurse    Total time spent with patient and review of records: 30 minutes.     Daniel Luis MD

## 2020-07-20 NOTE — DISCHARGE SUMMARY
Physician Discharge Summary     Patient ID:    Christine Iniguez  [id number]  1 y.o.  1985  Donna Mccarthy NP    Admit date: 7/19/2020    Discharge date and time: 7/20/2020    Admission Diagnoses: Hypertensive emergency [I16.1]; Hypertensive emergency [I16.1]    Chronic Diagnoses:    Problem List as of 7/20/2020 Date Reviewed: 7/19/2020          Codes Class Noted - Resolved    Essential hypertension ICD-10-CM: I10  ICD-9-CM: 401.9  Unknown - Present        Diabetes ([facility] ICD-10-CM: E11.9  ICD-9-CM: 250.00  Unknown - Present    Overview Signed 7/19/2020  5:59 AM by Que Hernandez MD     gestational diabetes             Headache ICD-10-CM: R51  ICD-9-CM: 784.0  Unknown - Present        Dizziness ICD-10-CM: R42  ICD-9-CM: 780.4  Unknown - Present        MIKE (acute kidney injury) ([facility] ICD-10-CM: N17.9  ICD-9-CM: 761. 9  Unknown - Present        Leukocytosis ICD-10-CM: D72.829  ICD-9-CM: 288.60  Unknown - Present        Hypertensive emergency ICD-10-CM: I16.1  ICD-9-CM: 401.9  7/19/2020 - Present        Vision abnormalities ICD-10-CM: H53.9  ICD-9-CM: 368.9  Unknown - Present        Pregnant ICD-10-CM: Z34.90  ICD-9-CM: V22.2  4/13/2017 - Present        Pregnancy ICD-10-CM: Z34.90  ICD-9-CM: V22.2  4/8/2017 - Present              Discharge Medications:   Current Discharge Medication List      START taking these medications    Details   amLODIPine (NORVASC) 10 mg tablet Take 1 Tab by mouth daily. Qty: 30 Tab, Refills: 1         CONTINUE these medications which have NOT CHANGED    Details   albuterol (PROVENTIL HFA, VENTOLIN HFA) 90 mcg/actuation inhaler Take  by inhalation. pnv w/o calcium-iron fum-fa 27-1 mg tab Take  by mouth.          STOP taking these medications       losartan (COZAAR) 25 mg tablet Comments:   Reason for Stopping:         HYDROcodone-acetaminophen (NORCO) 5-325 mg per tablet Comments:   Reason for Stopping:         ibuprofen (MOTRIN) 600 mg tablet Comments:   Reason for Stopping: insulin NPH (HUMULIN N PEN) 100 unit/mL (3 mL) inpn Comments:   Reason for Stopping: Follow up Care:    1. Marty Amato NP with in 1 weeks  2. Cardiology specialists as directed. Diet:  Diabetic Diet    Disposition:  Home. Advanced Directive:    Discharge Exam:  [See today's progress note.]    CONSULTATIONS: Cardiology, Nephrology and Neurology    Significant Diagnostic Studies:   Recent Labs     07/20/20 0327 07/19/20  0413   WBC 14.8* 17.8*   HGB 11.7 12.1   HCT 36.2 37.3    307     Recent Labs     07/20/20 0327 07/19/20  0413    138   K 3.8 3.6    104   CO2 26 28   BUN 21* 34*   CREA 1.68* 2.08*   * 222*   CA 8.7 8.5   MG 2.0  --    PHOS 3.6  --      Recent Labs     07/20/20 0327 07/19/20  0413   ALT 47 60   AP 89 94   TBILI 0.6 0.3   TP 7.0 7.3   ALB 3.3* 3.4*   GLOB 3.7 3.9       Lab Results   Component Value Date/Time    Glucose (POC) 218 (H) 07/20/2020 11:55 AM    Glucose (POC) 181 (H) 07/20/2020 07:28 AM    Glucose (POC) 185 (H) 07/19/2020 09:46 PM    Glucose (POC) 195 (H) 07/19/2020 04:13 PM    Glucose (POC) 239 (H) 07/19/2020 11:26 AM     Lab Results   Component Value Date/Time    TSH 3.36 07/20/2020 03:27 AM       HOSPITAL COURSE & TREATMENT RENDERED:   Hypertensive emergency: has not taken her regular antihypertensive (losartan) for months. SBP up to 240s on presentation, BP improved after IV hydralazine and labetalol. Caution re: rapid correction of HTN.   - Cardiology consult. - Check EKG. - Stress ECHO today  - TSH normal  - Amlodipine     Headache: associated with dizziness, vision abnormalities. Head CT showed no acute process. - Brain MRI neg  - Neurology consult. - Frequent neuro checks. - Tylenol PRN     Diabetes ([address] (): had gestational diabetes and appeared surprised to learn that her BG is up. - Start SSI, may need scheduled insulin. -  A1c pending     MIKE (acute kidney injury):   - send UA, urine 'lytes.    - Renal US to rule out hydronephrosis. - Nephrology consult     Leukocytosis (): possible reactive process. - CXR negative. - Send UA; may need abx.   - Follow closely     CP  - Serial troponins negative  - Repeat EKG normal       HPI:    Ms. Bush is a 34 y.o. female w/ hx of HTN, DM who presents with headache. Started ~ 2 days ago, frontal, severe, associated with nausea and visual changes. Thinks her BP is high as she has had similar HAs prior related to HTN. No CP, SOB.     ED workup showed markedly elevated BP close to 782 systolic. Head CT negative for ICH.        Ms. Bush is admitted for further evaluation and management. (Dr Melonie Hensley)     : She is c/o left sided CP radiating across her chest. /90. She has recently been on steroids for asthma exac. Checking A1c as her BS is up. She has a history of gestational diabetes. Will get EKG and repeat troponin and give Labetalol. Neuro, Cards, and Renal consults pending. Still with visual changes and h/a. MRI pending. CT head in ER neg for bleed.      : MRI neg. BP is better with meds. For stress ECHO today. BS are elevated and A1c is pending. Creatinine has improved. She was c/o abd discomfort and cramping yesterday. Felt like a jacky horse cramp. Abd Xray negative.      2p: Stress ECHO ok. Norvasc increased to 10 mg.  Cleared by cards for d/c.      Review of Systems:   A comprehensive review of systems was negative except for that written in the HPI.     Objective:   Physical Exam:      Visit Vitals  /81 (BP 1 Location: Right arm, BP Patient Position: At rest)   Pulse 64   Temp 97.7 °F (36.5 °C)   Resp 12   Ht 5' 6\" (1.676 m)   Wt 281 lb 1.4 oz (127.5 kg)   SpO2 100%   BMI 45.37 kg/m²      O2 Device: Room air     Temp (24hrs), Av.1 °F (36.7 °C), Min:97.7 °F (36.5 °C), Max:98.5 °F (36.9 °C)     1901 -  0700  In: 480 [P.O.:480]  Out: 600 [Urine:600]    07 -  1900  In: 1080 [P.O.:1080]  Out: 1450 [Urine:1450]     General:  Alert, cooperative, no distress, appears stated age. Head:  Normocephalic, without obvious abnormality, atraumatic. Eyes:  Conjunctivae/corneas clear. PERRL, EOMs intact. Nose: Nares normal. Septum midline. Mucosa normal. No drainage or sinus tenderness. Throat: Lips, mucosa, and tongue normal. Teeth and gums normal.   Neck: Supple, symmetrical, trachea midline, no adenopathy, thyroid: no enlargement/tenderness/nodules, no carotid bruit and no JVD. Back:   Symmetric, no curvature. ROM normal. No CVA tenderness. Lungs:   Clear to auscultation bilaterally. Chest wall:  No tenderness or deformity. Heart:  Regular rate and rhythm, S1, S2 normal, no murmur, click, rub or gallop. Abdomen:   Soft, non-tender. Bowel sounds normal. No masses,  No organomegaly. Extremities: Extremities normal, atraumatic, no cyanosis or edema. No calf tenderness or cords. Pulses: 2+ and symmetric all extremities. Skin: Skin color, texture, turgor normal. No rashes or lesions   Neurologic: CNII-XII intact. Alert and oriented X 3. Fine motor of hands and fingers normal.   equal.  No cogwheeling or rigidity. Gait not tested at this time. Sensation grossly normal to touch. Gross motor of extremities normal.        Data Review:    IMPRESSION: CT Head   No acute intracranial abnormality.      IMPRESSION: MRI Head  No acute intracranial abnormalities.     Interpretation Summary ECHO     · Normal cavity size, systolic function (ejection fraction normal) and diastolic function. Mildly increased wall thickness. Estimated left ventricular ejection fraction is 55 - 60%. No regional wall motion abnormality noted. · Mild aortic valve regurgitation is present. · Mildly elevated Aortic valve peak velocity without stenosis.            Signed:  Edgar Weston MD  7/20/2020  2:27 PM

## 2020-07-20 NOTE — PROGRESS NOTES
Stress test negative for ischemia   Hypertensive response SBP >200  Increase norvasc to 10 mg daily  Follow up with Dr Anjali Santiago   Reviewed medication changes and ST results with her  Okay for d/c

## 2020-07-20 NOTE — DISCHARGE INSTRUCTIONS
Patient Discharge Instructions    Letitia Marin / [id number] : 1985    Admitted 2020 Discharged: 2020 2:27 PM     ACUTE DIAGNOSES:  Hypertensive emergency [I16.1]  Hypertensive emergency [I16.1]    CHRONIC MEDICAL DIAGNOSES:  Problem List as of 2020 Date Reviewed: 2020          Codes Class Noted - Resolved    Essential hypertension ICD-10-CM: I10  ICD-9-CM: 401.9  Unknown - Present        Diabetes ([facility] ICD-10-CM: E11.9  ICD-9-CM: 250.00  Unknown - Present    Overview Signed 2020  5:59 AM by Levy Gilman MD     gestational diabetes             Headache ICD-10-CM: R51  ICD-9-CM: 784.0  Unknown - Present        Dizziness ICD-10-CM: R42  ICD-9-CM: 780.4  Unknown - Present        MIKE (acute kidney injury) ([facility] ICD-10-CM: N17.9  ICD-9-CM: 206. 9  Unknown - Present        Leukocytosis ICD-10-CM: D72.829  ICD-9-CM: 288.60  Unknown - Present        Hypertensive emergency ICD-10-CM: I16.1  ICD-9-CM: 401.9  2020 - Present        Vision abnormalities ICD-10-CM: H53.9  ICD-9-CM: 368.9  Unknown - Present        Pregnant ICD-10-CM: Z34.90  ICD-9-CM: V22.2  2017 - Present        Pregnancy ICD-10-CM: Z34.90  ICD-9-CM: V22.2  2017 - Present              DISCHARGE MEDICATIONS:         · It is important that you take the medication exactly as they are prescribed. · Keep your medication in the bottles provided by the pharmacist and keep a list of the medication names, dosages, and times to be taken in your wallet. · Do not take other medications without consulting your doctor. DIET:  Diabetic Diet    ACTIVITY: Activity as tolerated    ADDITIONAL INFORMATION: If you experience any of the following symptoms then please call your primary care physician or return to the emergency room if you cannot get hold of your doctor: Fever, chills, nausea, vomiting, diarrhea, change in mentation, falling, bleeding, shortness of breath.     FOLLOW UP CARE:  Dr. Jada Bowser NP  you are to call and set up an appointment to see them with in 1 week. Follow-up with specialists at directed by them      Information obtained by :  I understand that if any problems occur once I am at home I am to contact my physician. I understand and acknowledge receipt of the instructions indicated above.                                                                                                                                            Physician's or R.N.'s Signature                                                                  Date/Time                                                                                                                                              Patient or Representative Signature                                                          Date/Time

## 2020-07-20 NOTE — ROUTINE PROCESS
Spoke with PCP and  requested that patient call the office to schedule hospital follow-up appointment due to unresolved billing issues before they will schedule a new appointment.

## 2020-07-20 NOTE — PROGRESS NOTES
Bedside and Verbal shift change report given to Brittany 44 (oncoming nurse) by AMINTA LALA RN (offgoing nurse). Report included the following information SBAR, Kardex, Intake/Output, Accordion and Recent Results. SHIFT REPORT:    5569: pt reports 1 emesis occurrence this am \"yellow bile\" and \"2-3\" bowel movements        Bedside and Verbal shift change report given to ---- (oncoming nurse) by Michael Gale RN  (offgoing nurse). Report included the following information SBAR, Kardex, Intake/Output, Accordion and Recent Results.

## 2020-07-20 NOTE — PROGRESS NOTES
[address] [facility]  YOB: 1985          Assessment & Plan:   MIKE, likely hemodynamic, improving  May have some underlying CKD  Severe HTN, improved. Now controlled on amlodipine  Obesity  DM2    Rec:  Continue amlodipine  Follow up on 2nd HTN w/u but likely essential HTN  OK for d/c from renal standpoint. Can follow up with us in 2 wk once otherwise medically stable for d/c       Subjective:   CC: f/u MIKE  HPI: HTN much better. MIKE is improving. Getting stress test   ROS: no sob/n/v  Current Facility-Administered Medications   Medication Dose Route Frequency    insulin lispro (HUMALOG) injection   SubCUTAneous AC&HS    glucose chewable tablet 16 g  4 Tab Oral PRN    dextrose (D50W) injection syrg 12.5-25 g  12.5-25 g IntraVENous PRN    glucagon (GLUCAGEN) injection 1 mg  1 mg IntraMUSCular PRN    sodium chloride (NS) flush 5-40 mL  5-40 mL IntraVENous Q8H    sodium chloride (NS) flush 5-40 mL  5-40 mL IntraVENous PRN    acetaminophen (TYLENOL) tablet 650 mg  650 mg Oral Q6H PRN    naloxone (NARCAN) injection 0.4 mg  0.4 mg IntraVENous PRN    heparin (porcine) injection 5,000 Units  5,000 Units SubCUTAneous Q8H    labetaloL (NORMODYNE;TRANDATE) 20 mg/4 mL (5 mg/mL) injection 20 mg  20 mg IntraVENous Q6H PRN    amLODIPine (NORVASC) tablet 5 mg  5 mg Oral DAILY    ondansetron (ZOFRAN ODT) tablet 4 mg  4 mg Oral Q6H PRN          Objective:     Vitals:  Blood pressure 136/78, pulse 70, temperature 98.6 °F (37 °C), resp. rate 18, height 5' 6\" (1.676 m), weight 127.5 kg (281 lb 1.4 oz), SpO2 98 %, unknown if currently breastfeeding.   Temp (24hrs), Av.2 °F (36.8 °C), Min:97.7 °F (36.5 °C), Max:98.6 °F (37 °C)      Intake and Output:   07 -  190  In: -   Out: 600 [Urine:500]  1901 -  0700  In: 3172 [P.O.:1560]  Out:  [Urine:]    Physical Exam:               GENERAL ASSESSMENT: NAD  HEENT:Nontraumatic   CHEST: unlabored  HEART: Reg  EXTREMITY: no EDEMA  NEURO: Grossly non focal          ECG/rhythm:    Data Review      No results for input(s): TNIPOC in the last 72 hours. No lab exists for component: ITNL   Recent Labs     07/19/20  1950 07/19/20  0937 07/19/20  0413   TROIQ <0.05 <0.05 <0.05     Recent Labs     07/20/20  0327 07/19/20  0413    138   K 3.8 3.6    104   CO2 26 28   BUN 21* 34*   CREA 1.68* 2.08*   * 222*   PHOS 3.6  --    MG 2.0  --    CA 8.7 8.5   ALB 3.3* 3.4*   WBC 14.8* 17.8*   HGB 11.7 12.1   HCT 36.2 37.3    307      No results for input(s): INR, PTP, APTT, INREXT in the last 72 hours. Needs: urine analysis, urine sodium, protein and creatinine  Lab Results   Component Value Date/Time    Sodium,urine random 118 07/19/2020 09:37 AM    Creatinine, urine 49.00 07/19/2020 09:37 AM            : Nigel Morales MD  7/20/2020        [facility]:  [email or web address][note [email or web address]  Aaron Sarg[place] office:  [address] Liz Goldberg, [address] [place] [address],[address]  [place], [address] [facility]  Phone: [phone]  Fax :     [phone]    [place] office:  [address] [address] [address]  Phone - [phone]  Fax - [phone]

## 2020-07-24 LAB — ALDOST SERPL-MCNC: 4.2 NG/DL (ref 0–30)

## 2020-07-25 LAB
METANEPH FREE SERPL-MCNC: 21.9 PG/ML (ref 0–88)
NORMETANEPHRINE SERPL-MCNC: 64.5 PG/ML (ref 0–110.1)

## 2021-03-18 LAB — RENIN PLAS-CCNC: 1.78 NG/ML/HR (ref 0.17–5.38)

## 2021-06-28 ENCOUNTER — APPOINTMENT (OUTPATIENT)
Dept: ULTRASOUND IMAGING | Age: 36
End: 2021-06-28
Attending: EMERGENCY MEDICINE
Payer: COMMERCIAL

## 2021-06-28 ENCOUNTER — APPOINTMENT (OUTPATIENT)
Dept: CT IMAGING | Age: 36
End: 2021-06-28
Attending: EMERGENCY MEDICINE
Payer: COMMERCIAL

## 2021-06-28 ENCOUNTER — HOSPITAL ENCOUNTER (EMERGENCY)
Age: 36
Discharge: HOME | End: 2021-06-28
Attending: EMERGENCY MEDICINE
Payer: COMMERCIAL

## 2021-06-28 VITALS
SYSTOLIC BLOOD PRESSURE: 122 MMHG | HEIGHT: 66 IN | OXYGEN SATURATION: 95 % | RESPIRATION RATE: 20 BRPM | BODY MASS INDEX: 41.91 KG/M2 | HEART RATE: 78 BPM | TEMPERATURE: 98.8 F | DIASTOLIC BLOOD PRESSURE: 59 MMHG | WEIGHT: 260.8 LBS

## 2021-06-28 DIAGNOSIS — R10.32 ABDOMINAL PAIN, LLQ (LEFT LOWER QUADRANT): ICD-10-CM

## 2021-06-28 DIAGNOSIS — N83.209 HEMORRHAGIC OVARIAN CYST: Primary | ICD-10-CM

## 2021-06-28 LAB
ALBUMIN SERPL-MCNC: 4.1 G/DL (ref 3.5–5)
ALBUMIN/GLOB SERPL: 1 {RATIO} (ref 1.1–2.2)
ALP SERPL-CCNC: 96 U/L (ref 45–117)
ALT SERPL-CCNC: 27 U/L (ref 12–78)
ANION GAP SERPL CALC-SCNC: 14 MMOL/L (ref 5–15)
APPEARANCE UR: ABNORMAL
AST SERPL-CCNC: 16 U/L (ref 15–37)
BACTERIA URNS QL MICRO: ABNORMAL /HPF
BASOPHILS # BLD: 0 K/UL (ref 0–0.1)
BASOPHILS NFR BLD: 0 % (ref 0–1)
BILIRUB SERPL-MCNC: 0.3 MG/DL (ref 0.2–1)
BILIRUB UR QL: NEGATIVE
BUN SERPL-MCNC: 23 MG/DL (ref 6–20)
BUN/CREAT SERPL: 12 (ref 12–20)
CALCIUM SERPL-MCNC: 9.4 MG/DL (ref 8.5–10.1)
CHLORIDE SERPL-SCNC: 102 MMOL/L (ref 97–108)
CO2 SERPL-SCNC: 25 MMOL/L (ref 21–32)
COLOR UR: ABNORMAL
CREAT SERPL-MCNC: 1.94 MG/DL (ref 0.55–1.02)
DIFFERENTIAL METHOD BLD: ABNORMAL
EOSINOPHIL # BLD: 0 K/UL (ref 0–0.4)
EOSINOPHIL NFR BLD: 0 % (ref 0–7)
EPITH CASTS URNS QL MICRO: ABNORMAL /LPF
ERYTHROCYTE [DISTWIDTH] IN BLOOD BY AUTOMATED COUNT: 13.1 % (ref 11.5–14.5)
GLOBULIN SER CALC-MCNC: 4.2 G/DL (ref 2–4)
GLUCOSE SERPL-MCNC: 163 MG/DL (ref 65–100)
GLUCOSE UR STRIP.AUTO-MCNC: NEGATIVE MG/DL
HCG UR QL: NEGATIVE
HCT VFR BLD AUTO: 36 % (ref 35–47)
HGB BLD-MCNC: 11.6 G/DL (ref 11.5–16)
HGB UR QL STRIP: ABNORMAL
IMM GRANULOCYTES # BLD AUTO: 0.2 K/UL (ref 0–0.04)
IMM GRANULOCYTES NFR BLD AUTO: 1 % (ref 0–0.5)
KETONES UR QL STRIP.AUTO: NEGATIVE MG/DL
LEUKOCYTE ESTERASE UR QL STRIP.AUTO: ABNORMAL
LIPASE SERPL-CCNC: 180 U/L (ref 73–393)
LYMPHOCYTES # BLD: 4.3 K/UL (ref 0.8–3.5)
LYMPHOCYTES NFR BLD: 37 % (ref 12–49)
MCH RBC QN AUTO: 26.2 PG (ref 26–34)
MCHC RBC AUTO-ENTMCNC: 32.2 G/DL (ref 30–36.5)
MCV RBC AUTO: 81.4 FL (ref 80–99)
MONOCYTES # BLD: 0.5 K/UL (ref 0–1)
MONOCYTES NFR BLD: 4 % (ref 5–13)
NEUTS SEG # BLD: 6.7 K/UL (ref 1.8–8)
NEUTS SEG NFR BLD: 57 % (ref 32–75)
NITRITE UR QL STRIP.AUTO: NEGATIVE
NRBC # BLD: 0 K/UL (ref 0–0.01)
NRBC BLD-RTO: 0 PER 100 WBC
PH UR STRIP: 6 [PH] (ref 5–8)
PLATELET # BLD AUTO: 324 K/UL (ref 150–400)
PMV BLD AUTO: 10.6 FL (ref 8.9–12.9)
POTASSIUM SERPL-SCNC: 4 MMOL/L (ref 3.5–5.1)
PROT SERPL-MCNC: 8.3 G/DL (ref 6.4–8.2)
PROT UR STRIP-MCNC: 100 MG/DL
RBC # BLD AUTO: 4.42 M/UL (ref 3.8–5.2)
RBC #/AREA URNS HPF: ABNORMAL /HPF (ref 0–5)
SODIUM SERPL-SCNC: 141 MMOL/L (ref 136–145)
SP GR UR REFRACTOMETRY: 1.01 (ref 1–1.03)
UR CULT HOLD, URHOLD: NORMAL
UROBILINOGEN UR QL STRIP.AUTO: 0.2 EU/DL (ref 0.2–1)
WBC # BLD AUTO: 11.7 K/UL (ref 3.6–11)
WBC URNS QL MICRO: ABNORMAL /HPF (ref 0–4)
YEAST BUDDING URNS QL: PRESENT

## 2021-06-28 PROCEDURE — 83690 ASSAY OF LIPASE: CPT

## 2021-06-28 PROCEDURE — 74011250636 HC RX REV CODE- 250/636: Performed by: EMERGENCY MEDICINE

## 2021-06-28 PROCEDURE — 96374 THER/PROPH/DIAG INJ IV PUSH: CPT

## 2021-06-28 PROCEDURE — 80053 COMPREHEN METABOLIC PANEL: CPT

## 2021-06-28 PROCEDURE — 76830 TRANSVAGINAL US NON-OB: CPT

## 2021-06-28 PROCEDURE — 85025 COMPLETE CBC W/AUTO DIFF WBC: CPT

## 2021-06-28 PROCEDURE — 76856 US EXAM PELVIC COMPLETE: CPT

## 2021-06-28 PROCEDURE — 74176 CT ABD & PELVIS W/O CONTRAST: CPT

## 2021-06-28 PROCEDURE — 96375 TX/PRO/DX INJ NEW DRUG ADDON: CPT

## 2021-06-28 PROCEDURE — 96376 TX/PRO/DX INJ SAME DRUG ADON: CPT

## 2021-06-28 PROCEDURE — 81025 URINE PREGNANCY TEST: CPT

## 2021-06-28 PROCEDURE — 81001 URINALYSIS AUTO W/SCOPE: CPT

## 2021-06-28 PROCEDURE — 99284 EMERGENCY DEPT VISIT MOD MDM: CPT

## 2021-06-28 PROCEDURE — 36415 COLL VENOUS BLD VENIPUNCTURE: CPT

## 2021-06-28 RX ORDER — ONDANSETRON 2 MG/ML
4 INJECTION INTRAMUSCULAR; INTRAVENOUS
Status: COMPLETED | OUTPATIENT
Start: 2021-06-28 | End: 2021-06-28

## 2021-06-28 RX ORDER — HYDROMORPHONE HYDROCHLORIDE 1 MG/ML
0.5 INJECTION, SOLUTION INTRAMUSCULAR; INTRAVENOUS; SUBCUTANEOUS
Status: COMPLETED | OUTPATIENT
Start: 2021-06-28 | End: 2021-06-28

## 2021-06-28 RX ORDER — ONDANSETRON 4 MG/1
4 TABLET, ORALLY DISINTEGRATING ORAL
Qty: 20 TABLET | Refills: 0 | Status: SHIPPED | OUTPATIENT
Start: 2021-06-28 | End: 2022-04-04

## 2021-06-28 RX ORDER — METOCLOPRAMIDE HYDROCHLORIDE 5 MG/ML
10 INJECTION INTRAMUSCULAR; INTRAVENOUS
Status: COMPLETED | OUTPATIENT
Start: 2021-06-28 | End: 2021-06-28

## 2021-06-28 RX ADMIN — SODIUM CHLORIDE 1000 ML: 9 INJECTION, SOLUTION INTRAVENOUS at 21:06

## 2021-06-28 RX ADMIN — HYDROMORPHONE HYDROCHLORIDE 0.5 MG: 1 INJECTION, SOLUTION INTRAMUSCULAR; INTRAVENOUS; SUBCUTANEOUS at 21:05

## 2021-06-28 RX ADMIN — ONDANSETRON 4 MG: 2 INJECTION INTRAMUSCULAR; INTRAVENOUS at 21:05

## 2021-06-28 RX ADMIN — METOCLOPRAMIDE 10 MG: 5 INJECTION, SOLUTION INTRAMUSCULAR; INTRAVENOUS at 22:52

## 2021-06-28 RX ADMIN — HYDROMORPHONE HYDROCHLORIDE 0.5 MG: 1 INJECTION, SOLUTION INTRAMUSCULAR; INTRAVENOUS; SUBCUTANEOUS at 22:23

## 2021-06-29 NOTE — ED NOTES
Patient is discharged home, fully awake and alert, no signs of distress. Verbalized understanding of  Aftercare instructions. Left for home pain free, no c/o nausea or vomiting, Saline lock to left AC discontinued, catheter intact.

## 2021-06-29 NOTE — ED PROVIDER NOTES
Martha Velazquez is a 37 yo F with left flank pain. She states that she started having pain in her left mid back 5 days ago and for the past 2 days it has radiated to her left lower abdomen. She had nausea an vomiting on the way to the ED. She has not taken anything for pain because she has chronic kidney disease due to her diabetes and HTN. She denies fever, chills or diarrhea. Past Medical History:   Diagnosis Date    MIKE ([facility]) ([facility]     Asthma     Diabetes ([facility]     gestational diabetes    Essential hypertension     seen today for elevated blood pressure    Leukocytosis     Staph aureus infection 2006    over abdomen       Past Surgical History:   Procedure Laterality Date    HX BREAST REDUCTION      HX CHOLECYSTECTOMY      HX TONSILLECTOMY      HI BREAST SURGERY PROCEDURE UNLISTED           History reviewed. No pertinent family history. Social History     Socioeconomic History    Marital status: SINGLE     Spouse name: Not on file    Number of children: Not on file    Years of education: Not on file    Highest education level: Not on file   Occupational History    Not on file   Tobacco Use    Smoking status: Never Smoker    Smokeless tobacco: Never Used   Vaping Use    Vaping Use: Never assessed   Substance and Sexual Activity    Alcohol use: No    Drug use: No    Sexual activity: Yes     Partners: Male   Other Topics Concern    Not on file   Social History Narrative    Not on file     Social Determinants of Health     Financial Resource Strain:     Difficulty of Paying Living Expenses:    Food Insecurity:     Worried About Running Out of Food in the Last Year:     [address] in the Last Year:    Transportation Needs:     Lack of Transportation (Medical):      Lack of Transportation (Non-Medical):    Physical Activity:     Days of Exercise per Week:     Minutes of Exercise per Session:    Stress:     Feeling of Stress :    Social Connections:     Frequency of Communication with Friends and Family:     Frequency of Social Gatherings with Friends and Family:     Attends Religion Services:     Active Member of Clubs or Organizations:     Attends Club or Organization Meetings:     Marital Status:    Intimate Partner Violence:     Fear of Current or Ex-Partner:     Emotionally Abused:     Physically Abused:     Sexually Abused: ALLERGIES: Biaxin [clarithromycin], Chlorzoxazone, Codeine, Keflex [cephalexin], and Penicillins    Review of Systems   Constitutional: Negative for fever. HENT: Negative for sore throat. Eyes: Negative for visual disturbance. Respiratory: Negative for cough. Cardiovascular: Negative for chest pain. Gastrointestinal: Positive for abdominal pain, nausea and vomiting. Genitourinary: Positive for flank pain. Musculoskeletal: Positive for back pain. Skin: Negative for rash. Neurological: Negative for headaches. Vitals:    06/28/21 2043   BP: (!) 169/95   Pulse: 98   Resp: 20   Temp: 98.9 °F (37.2 °C)   SpO2: 98%   Weight: 118.3 kg (260 lb 12.9 oz)   Height: 5' 6\" (1.676 m)            Physical Exam  Vitals and nursing note reviewed. Constitutional:       General: She is not in acute distress. Appearance: She is well-developed. HENT:      Head: Normocephalic and atraumatic. Eyes:      Conjunctiva/sclera: Conjunctivae normal.   Neck:      Trachea: Phonation normal.   Cardiovascular:      Rate and Rhythm: Normal rate. Pulmonary:      Effort: Pulmonary effort is normal. No respiratory distress. Abdominal:      General: There is no distension. Tenderness: There is abdominal tenderness in the left lower quadrant. There is no right CVA tenderness or left CVA tenderness. Musculoskeletal:         General: No tenderness. Normal range of motion. Cervical back: Normal range of motion. Skin:     General: Skin is warm and dry. Neurological:      Mental Status: She is alert.  She is not disoriented. Motor: No abnormal muscle tone. MDM       10:14 PM  PAtient reassessed and continues to have pain. Tenderness in LLQ. CT abd/pelvis with no acute findings. Will obtain pelvic US to r/o  Torsion. 11:38 PM  Patient reassessed and pain and nausea improved. Pelvic US confirms hemorrhagic ovarian cyst but normal blood flow, no torsion. Will discharge home with prescription for zofran.   Patient to follow-up with her gynecologist.     Kye Gold

## 2022-02-01 ENCOUNTER — HOSPITAL ENCOUNTER (EMERGENCY)
Age: 37
Discharge: HOME | End: 2022-02-01
Attending: STUDENT IN AN ORGANIZED HEALTH CARE EDUCATION/TRAINING PROGRAM
Payer: COMMERCIAL

## 2022-02-01 VITALS
BODY MASS INDEX: 46.82 KG/M2 | OXYGEN SATURATION: 98 % | HEIGHT: 65 IN | TEMPERATURE: 99.5 F | SYSTOLIC BLOOD PRESSURE: 180 MMHG | RESPIRATION RATE: 16 BRPM | HEART RATE: 78 BPM | DIASTOLIC BLOOD PRESSURE: 94 MMHG | WEIGHT: 281 LBS

## 2022-02-01 DIAGNOSIS — R80.9 PROTEINURIA, UNSPECIFIED TYPE: ICD-10-CM

## 2022-02-01 DIAGNOSIS — R51.9 NONINTRACTABLE HEADACHE, UNSPECIFIED CHRONICITY PATTERN, UNSPECIFIED HEADACHE TYPE: Primary | ICD-10-CM

## 2022-02-01 LAB — CA-I BLD-SCNC: 1.23 MMOL/L (ref 1.12–1.32)

## 2022-02-01 PROCEDURE — 74011250636 HC RX REV CODE- 250/636: Performed by: STUDENT IN AN ORGANIZED HEALTH CARE EDUCATION/TRAINING PROGRAM

## 2022-02-01 PROCEDURE — 99283 EMERGENCY DEPT VISIT LOW MDM: CPT

## 2022-02-01 PROCEDURE — 82330 ASSAY OF CALCIUM: CPT

## 2022-02-01 PROCEDURE — 96374 THER/PROPH/DIAG INJ IV PUSH: CPT

## 2022-02-01 PROCEDURE — 74011250637 HC RX REV CODE- 250/637: Performed by: STUDENT IN AN ORGANIZED HEALTH CARE EDUCATION/TRAINING PROGRAM

## 2022-02-01 RX ORDER — METOCLOPRAMIDE HYDROCHLORIDE 5 MG/ML
10 INJECTION INTRAMUSCULAR; INTRAVENOUS
Status: COMPLETED | OUTPATIENT
Start: 2022-02-01 | End: 2022-02-01

## 2022-02-01 RX ORDER — ACETAMINOPHEN 500 MG
1000 TABLET ORAL ONCE
Status: COMPLETED | OUTPATIENT
Start: 2022-02-01 | End: 2022-02-01

## 2022-02-01 RX ADMIN — METOCLOPRAMIDE 10 MG: 5 INJECTION, SOLUTION INTRAMUSCULAR; INTRAVENOUS at 14:19

## 2022-02-01 RX ADMIN — ACETAMINOPHEN 1000 MG: 500 TABLET ORAL at 14:19

## 2022-02-01 NOTE — ED PROVIDER NOTES
The history is provided by the patient. Abnormal Lab Results  Chronicity: unknown, pt sent from [facility] due to blood and protein in UA. Pt with h/o stage 2 CKD, DM, and HTN. .  Episode onset: UA done today. The problem has not changed since onset. Associated symptoms include headaches (1 week, finished cefdinir recently for sinusitis). Pertinent negatives include no chest pain, no abdominal pain and no shortness of breath. The symptoms are aggravated by walking and standing. The symptoms are relieved by rest. She has tried nothing (told not to ever take medicine because of kidneys) for the symptoms. The treatment provided no relief. Past Medical History:   Diagnosis Date    [facility] [address] [facility]     Asthma     Diabetes ([facility] [address]     gestational diabetes    Essential hypertension     seen today for elevated blood pressure    Leukocytosis     Staph aureus infection 2006    over abdomen       Past Surgical History:   Procedure Laterality Date    HX BREAST REDUCTION      HX CHOLECYSTECTOMY      HX TONSILLECTOMY      [place] BREAST SURGERY PROCEDURE UNLISTED           History reviewed. No pertinent family history.     Social History     Socioeconomic History    Marital status: SINGLE     Spouse name: Not on file    Number of children: Not on file    Years of education: Not on file    Highest education level: Not on file   Occupational History    Not on file   Tobacco Use    Smoking status: Never Smoker    Smokeless tobacco: Never Used   Vaping Use    Vaping Use: Not on file   Substance and Sexual Activity    Alcohol use: No    Drug use: No    Sexual activity: Yes     Partners: Male   Other Topics Concern    Not on file   Social History Narrative    Not on file     Social Determinants of Health     Financial Resource Strain:     Difficulty of Paying Living Expenses: Not on file   Food Insecurity:     Worried About Running Out of Food in the Last Year: Not on file    Christian barajas Food in the Last Year: Not on file   Transportation Needs:     Lack of Transportation (Medical): Not on file    Lack of Transportation (Non-Medical): Not on file   Physical Activity:     Days of Exercise per Week: Not on file    Minutes of Exercise per Session: Not on file   Stress:     Feeling of Stress : Not on file   Social Connections:     Frequency of Communication with Friends and Family: Not on file    Frequency of Social Gatherings with Friends and Family: Not on file    Attends Mandaen Services: Not on file    Active Member of [address] [place] [address] [employer] or Organizations: Not on file    Attends Club or Organization Meetings: Not on file    Marital Status: Not on file   Intimate Partner Violence:     Fear of Current or Ex-Partner: Not on file    Emotionally Abused: Not on file    Physically Abused: Not on file    Sexually Abused: Not on file   Housing Stability:     Unable to Pay for Housing in the Last Year: Not on file    Number of [place] in the Last Year: Not on file    Unstable Housing in the Last Year: Not on file         ALLERGIES: Biaxin [clarithromycin], Chlorzoxazone, Codeine, Keflex [cephalexin], and Penicillins    Review of Systems   Respiratory: Negative for shortness of breath. Cardiovascular: Negative for chest pain. Gastrointestinal: Negative for abdominal pain, diarrhea and vomiting. Musculoskeletal: Negative for back pain and neck pain. Neurological: Positive for headaches (1 week, finished cefdinir recently for sinusitis). Negative for speech difficulty, weakness and numbness. All other systems reviewed and are negative. Vitals:    02/01/22 1404   BP: (!) 180/94   Pulse: 78   Resp: 16   Temp: 99.5 °F (37.5 °C)   SpO2: 98%   Weight: 127.5 kg (281 lb)   Height: 5' 5\" (1.651 m)            Physical Exam  Vitals and nursing note reviewed. Constitutional:       General: She is not in acute distress. Appearance: She is well-developed. She is obese.    HENT:      Head: Normocephalic and atraumatic. Eyes:      Extraocular Movements: Extraocular movements intact. Conjunctiva/sclera: Conjunctivae normal.   Cardiovascular:      Rate and Rhythm: Normal rate and regular rhythm. Pulmonary:      Effort: Pulmonary effort is normal. No respiratory distress. Abdominal:      Palpations: Abdomen is soft. Tenderness: There is no abdominal tenderness. There is no guarding. Musculoskeletal:         General: No tenderness. Normal range of motion. Cervical back: Normal range of motion and neck supple. Right lower leg: No edema. Left lower leg: No edema. Skin:     General: Skin is warm and dry. Neurological:      Mental Status: She is alert and oriented to person, place, and time. Cranial Nerves: Cranial nerves are intact. Sensory: No sensory deficit. Motor: No abnormal muscle tone. Gait: Gait is intact. MDM       Procedures      A/P: This is a 13-year-old female who presents as a referral from an urgent care center due to an abnormal urinalysis which showed proteinuria and microscopic hematuria, likely due to her chronic kidney disease. She originally went to [facility] because she was having headache for the past week in the setting of recent sinusitis and upper respiratory infection. She has not taken thing for her symptoms at home. Plan to check chemistry here and give migraine cocktail for symptoms. No clinical indication for emergent imaging today. [facility] stabilized.

## 2022-02-01 NOTE — ED TRIAGE NOTES
Patient ambulatory to triage. Patient was sent here from [facility] for abnormal lab results. Patient states she has been on omnicef for sinus infection. COVID negative.

## 2022-03-19 PROBLEM — I16.1 HYPERTENSIVE EMERGENCY: Status: ACTIVE | Noted: 2020-07-19

## 2022-03-19 PROBLEM — Z34.90 PREGNANCY: Status: ACTIVE | Noted: 2017-04-08

## 2022-03-20 PROBLEM — Z34.90 PREGNANT: Status: ACTIVE | Noted: 2017-04-13

## 2022-04-04 ENCOUNTER — HOSPITAL ENCOUNTER (EMERGENCY)
Age: 37
Discharge: HOME | End: 2022-04-04
Attending: EMERGENCY MEDICINE
Payer: COMMERCIAL

## 2022-04-04 ENCOUNTER — APPOINTMENT (OUTPATIENT)
Dept: GENERAL RADIOLOGY | Age: 37
End: 2022-04-04
Attending: EMERGENCY MEDICINE
Payer: COMMERCIAL

## 2022-04-04 VITALS
WEIGHT: 287.04 LBS | BODY MASS INDEX: 47.77 KG/M2 | OXYGEN SATURATION: 100 % | DIASTOLIC BLOOD PRESSURE: 92 MMHG | TEMPERATURE: 98.8 F | RESPIRATION RATE: 18 BRPM | SYSTOLIC BLOOD PRESSURE: 156 MMHG | HEART RATE: 91 BPM

## 2022-04-04 DIAGNOSIS — J45.901 MODERATE ASTHMA WITH ACUTE EXACERBATION, UNSPECIFIED WHETHER PERSISTENT: Primary | ICD-10-CM

## 2022-04-04 PROCEDURE — 71046 X-RAY EXAM CHEST 2 VIEWS: CPT

## 2022-04-04 PROCEDURE — 99283 EMERGENCY DEPT VISIT LOW MDM: CPT

## 2022-04-04 RX ORDER — BENZONATATE 100 MG/1
100 CAPSULE ORAL
COMMUNITY

## 2022-04-04 RX ORDER — PREDNISONE 10 MG/1
TABLET ORAL
Qty: 21 TABLET | Refills: 0 | Status: SHIPPED | OUTPATIENT
Start: 2022-04-04

## 2022-04-04 RX ORDER — GLIMEPIRIDE 2 MG/1
2 TABLET ORAL
COMMUNITY

## 2022-04-04 RX ORDER — DOXYCYCLINE 100 MG/1
100 TABLET ORAL 2 TIMES DAILY
COMMUNITY

## 2022-04-04 RX ORDER — VALSARTAN 80 MG/1
80 TABLET ORAL DAILY
COMMUNITY

## 2022-04-05 NOTE — ED NOTES
Pt given discharge instructions by Dr Alvarez Garcia she verbalizes an understanding pt stable at time of discharge

## 2022-04-05 NOTE — ED TRIAGE NOTES
Pt ambulates to treatment area she states that for more than a week now she has had SOB with increased SOB while walking. She states that last week she went to [facility] they gave her steroids and antibiotic but she continues to feel bad. Pt states that since Covid in early February her breathing has been worse.   Dr Ellie Hector at the bedside

## 2022-04-05 NOTE — ED PROVIDER NOTES
History of hypertension, diabetes, asthma. She presents with complaints of cough and difficulty breathing. Her symptoms began greater than a week ago. She was seen at an urgent care clinic and prescribed prednisone, doxycycline, and Tessalon Perles. She does not feel like these helped very much. She feels like she has continued to wheeze. She feels short of breath. No fever. Good p.o. intake. Her symptoms are moderate. Past Medical History:   Diagnosis Date    [facility]) [facility] [address]     Asthma     COVID     Diabetes ([facility] [address]     gestational diabetes    Essential hypertension     seen today for elevated blood pressure    Leukocytosis     Staph aureus infection 2006    over abdomen       Past Surgical History:   Procedure Laterality Date    HX BREAST REDUCTION      HX CHOLECYSTECTOMY      HX TONSILLECTOMY      [place] BREAST SURGERY PROCEDURE UNLISTED           History reviewed. No pertinent family history. Social History     Socioeconomic History    Marital status:      Spouse name: Not on file    Number of children: Not on file    Years of education: Not on file    Highest education level: Not on file   Occupational History    Not on file   Tobacco Use    Smoking status: Never Smoker    Smokeless tobacco: Never Used   Vaping Use    Vaping Use: Not on file   Substance and Sexual Activity    Alcohol use: No    Drug use: No    Sexual activity: Yes     Partners: Male   Other Topics Concern    Not on file   Social History Narrative    Not on file     Social Determinants of Health     Financial Resource Strain:     Difficulty of Paying Living Expenses: Not on file   Food Insecurity:     Worried About Running Out of Food in the Last Year: Not on file    Christian of Food in the Last Year: Not on file   Transportation Needs:     Lack of Transportation (Medical): Not on file    Lack of Transportation (Non-Medical):  Not on file   Physical Activity:     Days of Exercise per Week: Not on file    Minutes of Exercise per Session: Not on file   Stress:     Feeling of Stress : Not on file   Social Connections:     Frequency of Communication with Friends and Family: Not on file    Frequency of Social Gatherings with Friends and Family: Not on file    Attends Hoahaoism Services: Not on file    Active Member of [address] [place] [address] or Organizations: Not on file    Attends Club or Organization Meetings: Not on file    Marital Status: Not on file   Intimate Partner Violence:     Fear of Current or Ex-Partner: Not on file    Emotionally Abused: Not on file    Physically Abused: Not on file    Sexually Abused: Not on file   Housing Stability:     Unable to Pay for Housing in the Last Year: Not on file    Number of [place] in the Last Year: Not on file    Unstable Housing in the Last Year: Not on file         ALLERGIES: Biaxin [clarithromycin], Chlorzoxazone, Codeine, Keflex [cephalexin], and Penicillins    Review of Systems   All other systems reviewed and are negative. There were no vitals filed for this visit. Physical Exam  Vitals and nursing note reviewed. Constitutional:       Appearance: She is well-developed. HENT:      Head: Normocephalic and atraumatic. Eyes:      Conjunctiva/sclera: Conjunctivae normal.   Neck:      Trachea: No tracheal deviation. Cardiovascular:      Rate and Rhythm: Normal rate and regular rhythm. Heart sounds: Normal heart sounds. No murmur heard. No friction rub. No gallop. Pulmonary:      Effort: Pulmonary effort is normal.      Breath sounds: Normal breath sounds. Abdominal:      Palpations: Abdomen is soft. Tenderness: There is no abdominal tenderness. Musculoskeletal:         General: No deformity. Cervical back: Neck supple. Skin:     General: Skin is warm and dry. Neurological:      Mental Status: She is alert.       Comments: oriented          MDM       Procedures    Progress Note:  Results, treatment, and follow up plan have been discussed with patient. Questions were answered. Krys Ying MD  10:36 PM    Assessment/plan: Asthma exacerbation -lungs clear in the ED. Reassuring appearance/exam with stable vital signs. Chest x-ray clear. Continue albuterol. Prednisone. PCP/pulmonary follow-up. Return precautions discussed.   Krys Ying MD  10:37 PM

## 2022-07-10 ENCOUNTER — HOSPITAL ENCOUNTER (EMERGENCY)
Age: 37
Discharge: HOME | End: 2022-07-11
Attending: EMERGENCY MEDICINE

## 2022-07-10 VITALS
TEMPERATURE: 97.3 F | WEIGHT: 280 LBS | HEART RATE: 97 BPM | DIASTOLIC BLOOD PRESSURE: 86 MMHG | RESPIRATION RATE: 22 BRPM | BODY MASS INDEX: 45 KG/M2 | OXYGEN SATURATION: 98 % | HEIGHT: 66 IN | SYSTOLIC BLOOD PRESSURE: 218 MMHG

## 2022-07-10 DIAGNOSIS — M25.552 LEFT HIP PAIN: Primary | ICD-10-CM

## 2022-07-10 PROCEDURE — 99284 EMERGENCY DEPT VISIT MOD MDM: CPT

## 2022-07-10 PROCEDURE — 96372 THER/PROPH/DIAG INJ SC/IM: CPT

## 2022-07-10 RX ORDER — KETOROLAC TROMETHAMINE 30 MG/ML
60 INJECTION, SOLUTION INTRAMUSCULAR; INTRAVENOUS ONCE
Status: COMPLETED | OUTPATIENT
Start: 2022-07-10 | End: 2022-07-11

## 2022-07-11 ENCOUNTER — APPOINTMENT (OUTPATIENT)
Dept: GENERAL RADIOLOGY | Age: 37
End: 2022-07-11
Attending: EMERGENCY MEDICINE

## 2022-07-11 PROCEDURE — 73502 X-RAY EXAM HIP UNI 2-3 VIEWS: CPT

## 2022-07-11 PROCEDURE — 74011250636 HC RX REV CODE- 250/636: Performed by: EMERGENCY MEDICINE

## 2022-07-11 RX ORDER — LIDOCAINE 50 MG/G
PATCH TOPICAL
Qty: 15 EACH | Refills: 0 | Status: SHIPPED | OUTPATIENT
Start: 2022-07-11

## 2022-07-11 RX ADMIN — KETOROLAC TROMETHAMINE 60 MG: 30 INJECTION, SOLUTION INTRAMUSCULAR at 00:04

## 2022-07-11 NOTE — ED PROVIDER NOTES
History of hypertension, gestational diabetes, MIKE, asthma. She presents with complaints of a 4 to 5-week history of left hip pain. She states that initially began hurting after she walked in heels. Then she states about a week later she was at work joking around and reaggravated it doing a \"model walk. \"  It has been moderate since then. It is worse with movement. She has tried ibuprofen and ice with limited relief. No other injuries or complaints. Past Medical History:   Diagnosis Date    [facility]) [facility] [address]     Asthma     COVID     Diabetes ([facility] [address]     gestational diabetes    Essential hypertension     seen today for elevated blood pressure    Leukocytosis     Staph aureus infection 2006    over abdomen       Past Surgical History:   Procedure Laterality Date    HX BREAST REDUCTION      HX CHOLECYSTECTOMY      HX TONSILLECTOMY      [place] BREAST SURGERY PROCEDURE UNLISTED           No family history on file. Social History     Socioeconomic History    Marital status:      Spouse name: Not on file    Number of children: Not on file    Years of education: Not on file    Highest education level: Not on file   Occupational History    Not on file   Tobacco Use    Smoking status: Never Smoker    Smokeless tobacco: Never Used   Vaping Use    Vaping Use: Not on file   Substance and Sexual Activity    Alcohol use: No    Drug use: No    Sexual activity: Yes     Partners: Male   Other Topics Concern    Not on file   Social History Narrative    Not on file     Social Determinants of Health     Financial Resource Strain:     Difficulty of Paying Living Expenses: Not on file   Food Insecurity:     Worried About Running Out of Food in the Last Year: Not on file    Christian of Food in the Last Year: Not on file   Transportation Needs:     Lack of Transportation (Medical): Not on file    Lack of Transportation (Non-Medical):  Not on file   Physical Activity:     Days of Exercise per Week: Not on file    Minutes of Exercise per Session: Not on file   Stress:     Feeling of Stress : Not on file   Social Connections:     Frequency of Communication with Friends and Family: Not on file    Frequency of Social Gatherings with Friends and Family: Not on file    Attends Faith Services: Not on file    Active Member of [address] [place] [address] or Organizations: Not on file    Attends Club or Organization Meetings: Not on file    Marital Status: Not on file   Intimate Partner Violence:     Fear of Current or Ex-Partner: Not on file    Emotionally Abused: Not on file    Physically Abused: Not on file    Sexually Abused: Not on file   Housing Stability:     Unable to Pay for Housing in the Last Year: Not on file    Number of [place] in the Last Year: Not on file    Unstable Housing in the Last Year: Not on file         ALLERGIES: Biaxin [clarithromycin], Chlorzoxazone, Codeine, Keflex [cephalexin], and Penicillins    Review of Systems   All other systems reviewed and are negative. Vitals:    07/10/22 2342   BP: (!) 218/86   Pulse: 97   Resp: 22   Temp: 97.3 °F (36.3 °C)   SpO2: 98%   Weight: 127 kg (280 lb)   Height: 5' 6\" (1.676 m)            Physical Exam  Vitals and nursing note reviewed. Constitutional:       Appearance: She is well-developed. HENT:      Head: Normocephalic and atraumatic. Eyes:      Conjunctiva/sclera: Conjunctivae normal.   Neck:      Trachea: No tracheal deviation. Cardiovascular:      Rate and Rhythm: Normal rate. Pulmonary:      Effort: Pulmonary effort is normal.   Abdominal:      General: There is no distension. Musculoskeletal:      Comments: Point tender left hip. Reproducible pain with passive range of motion. Neurovascular intact. Skin:     General: Skin is dry. Neurological:      Mental Status: She is alert. MDM       Procedures    Progress Note:  Results, treatment, and follow up plan have been discussed with patient. Questions were answered. The Toradol helped. Aracelis Horvath MD  12:39 AM    Assessment/plan: Left hip pain -suspect bursitis versus arthritis. Reassuring appearance/exam with stable vital signs. Left hip films with no acute process. Home with Lidoderm patches. Continue Tylenol or ibuprofen. Ortho follow-up. Return precautions discussed.   Aracelis Horvath MD  12:39 AM

## 2022-07-11 NOTE — ED TRIAGE NOTES
Pt comes in c/o left hip pain. Pt states that three weeks ago she was wearing heels and she might have tweaked her hip. A week after the incident she was at work doing a \"model walk\" and re injured her hip. Meds and ice have not eased the pain.

## 2023-01-31 ENCOUNTER — HOSPITAL ENCOUNTER (EMERGENCY)
Age: 38
Discharge: HOME | End: 2023-01-31
Attending: EMERGENCY MEDICINE
Payer: COMMERCIAL

## 2023-01-31 VITALS
BODY MASS INDEX: 46.65 KG/M2 | SYSTOLIC BLOOD PRESSURE: 151 MMHG | RESPIRATION RATE: 16 BRPM | TEMPERATURE: 98.6 F | OXYGEN SATURATION: 98 % | DIASTOLIC BLOOD PRESSURE: 81 MMHG | HEART RATE: 84 BPM | WEIGHT: 289.02 LBS

## 2023-01-31 DIAGNOSIS — J06.9 ACUTE UPPER RESPIRATORY INFECTION: Primary | ICD-10-CM

## 2023-01-31 DIAGNOSIS — R09.89 SYMPTOMS OF UPPER RESPIRATORY INFECTION (URI): ICD-10-CM

## 2023-01-31 PROCEDURE — 99283 EMERGENCY DEPT VISIT LOW MDM: CPT

## 2023-01-31 RX ORDER — NAPROXEN 500 MG/1
500 TABLET ORAL 2 TIMES DAILY WITH MEALS
Qty: 20 TABLET | Refills: 0 | Status: SHIPPED | OUTPATIENT
Start: 2023-01-31 | End: 2023-02-10

## 2023-01-31 RX ORDER — GUAIFENESIN, PSEUDOEPHEDRINE HYDROCHLORIDE 600; 60 MG/1; MG/1
1 TABLET, EXTENDED RELEASE ORAL EVERY 12 HOURS
Qty: 10 TABLET | Refills: 0 | Status: SHIPPED | OUTPATIENT
Start: 2023-01-31

## 2023-01-31 RX ORDER — OXYMETAZOLINE HCL 0.05 %
2 SPRAY, NON-AEROSOL (ML) NASAL 2 TIMES DAILY
Qty: 1 EACH | Refills: 0 | Status: SHIPPED | OUTPATIENT
Start: 2023-01-31 | End: 2023-02-03

## 2023-01-31 RX ORDER — BENZONATATE 100 MG/1
100 CAPSULE ORAL
Qty: 30 CAPSULE | Refills: 0 | Status: SHIPPED | OUTPATIENT
Start: 2023-01-31 | End: 2023-02-07

## 2023-01-31 RX ORDER — PREDNISONE 50 MG/1
50 TABLET ORAL DAILY
Qty: 3 TABLET | Refills: 0 | Status: SHIPPED | OUTPATIENT
Start: 2023-01-31 | End: 2023-02-03

## 2023-02-01 NOTE — ED NOTES
The patient was discharged home by Dr Davida Jauregui in stable condition. The patient is alert and oriented, in no respiratory distress and discharge vital signs obtained. The patient's diagnosis, condition and treatment were explained. The patient expressed understanding. Prescriptions given/e-scribed to pharmacy. No work/school note given. A discharge plan has been developed. A  was not involved in the process. Aftercare instructions were given. Pt ambulatory out of the ED.

## 2023-02-01 NOTE — ED PROVIDER NOTES
24-year-old female history of MIKE, asthma, COVID, prediabetes, hypertension presents to the emergency department chief complaint of congestion, sore throat, headache, ear pain. This has been progressing for the past 7 days. Has been sick with a cold. She felt febrile the other night. No shortness of breath or chest pressure. The history is provided by the patient and medical records. Sinus Pain   This is a new problem. Associated symptoms include ear pain. Ear Pain        Past Medical History:   Diagnosis Date    MIKE (acute kidney injury) ([facility] [address]     Asthma     COVID     Diabetes ([facility] [address]     gestational diabetes    Essential hypertension     seen today for elevated blood pressure    Leukocytosis     Staph aureus infection 2006    over abdomen       Past Surgical History:   Procedure Laterality Date    HX BREAST REDUCTION      HX CHOLECYSTECTOMY      HX TONSILLECTOMY      [place] BREAST SURGERY PROCEDURE UNLISTED           No family history on file.     Social History     Socioeconomic History    Marital status:      Spouse name: Not on file    Number of children: Not on file    Years of education: Not on file    Highest education level: Not on file   Occupational History    Not on file   Tobacco Use    Smoking status: Never    Smokeless tobacco: Never   Vaping Use    Vaping Use: Not on file   Substance and Sexual Activity    Alcohol use: No    Drug use: No    Sexual activity: Yes     Partners: Male   Other Topics Concern    Not on file   Social History Narrative    Not on file     Social Determinants of Health     Financial Resource Strain: Not on file   Food Insecurity: Not on file   Transportation Needs: Not on file   Physical Activity: Not on file   Stress: Not on file   Social Connections: Not on file   Intimate Partner Violence: Not on file   Housing Stability: Not on file         ALLERGIES: Biaxin [clarithromycin], Chlorzoxazone, Codeine, Keflex [cephalexin], and Penicillins    Review of Systems HENT:  Positive for ear pain and sinus pain. Vitals:    01/31/23 2052   BP: (!) 151/81   Pulse: 84   Resp: 16   Temp: 98.6 °F (37 °C)   SpO2: 98%   Weight: 131.1 kg (289 lb 0.4 oz)            Physical Exam  Constitutional:       General: She is not in acute distress. Appearance: Normal appearance. She is obese. She is not ill-appearing. HENT:      Right Ear: Tympanic membrane normal.      Left Ear: Tympanic membrane normal.      Nose: Congestion present. Mouth/Throat:      Mouth: Mucous membranes are moist.      Pharynx: Oropharynx is clear. Posterior oropharyngeal erythema present. No oropharyngeal exudate. Eyes:      Extraocular Movements: Extraocular movements intact. Conjunctiva/sclera: Conjunctivae normal.   Cardiovascular:      Rate and Rhythm: Normal rate. Pulses: Normal pulses. Pulmonary:      Effort: Pulmonary effort is normal. No respiratory distress. Breath sounds: Normal breath sounds. No wheezing, rhonchi or rales. Musculoskeletal:      Cervical back: Neck supple. Lymphadenopathy:      Cervical: Cervical adenopathy present. Skin:     General: Skin is warm and dry. Medical Decision Making  57-year-old female presents as above with upper respiratory symptoms. She would be outside the window for Tamiflu or Paxlovid. Will treat symptomatically, follow-up with primary care, return if needed. Amount and/or Complexity of Data Reviewed  External Data Reviewed: notes.            Procedures

## 2023-05-18 RX ORDER — AMLODIPINE BESYLATE 10 MG/1
1 TABLET ORAL DAILY
COMMUNITY
Start: 2020-09-21

## 2023-05-18 RX ORDER — VALSARTAN 80 MG/1
80 TABLET ORAL DAILY
COMMUNITY
End: 2023-07-11

## 2023-05-18 RX ORDER — GUAIFENESIN, PSEUDOEPHEDRINE HYDROCHLORIDE 600; 60 MG/1; MG/1
1 TABLET, EXTENDED RELEASE ORAL EVERY 12 HOURS
COMMUNITY
Start: 2023-01-31 | End: 2023-07-11

## 2023-05-18 RX ORDER — ALBUTEROL SULFATE 90 UG/1
AEROSOL, METERED RESPIRATORY (INHALATION)
COMMUNITY

## 2023-05-18 RX ORDER — GLIMEPIRIDE 2 MG/1
2 TABLET ORAL 2 TIMES DAILY
COMMUNITY

## 2023-06-27 ENCOUNTER — HOSPITAL ENCOUNTER (EMERGENCY)
Facility: HOSPITAL | Age: 38
Discharge: HOME | End: 2023-06-28
Attending: EMERGENCY MEDICINE
Payer: COMMERCIAL

## 2023-06-27 DIAGNOSIS — I47.1 PAROXYSMAL SUPRAVENTRICULAR TACHYCARDIA (HCC): Primary | ICD-10-CM

## 2023-06-27 LAB
ALBUMIN SERPL-MCNC: 3.3 G/DL (ref 3.5–5)
ALBUMIN/GLOB SERPL: 0.8 (ref 1.1–2.2)
ALP SERPL-CCNC: 116 U/L (ref 45–117)
ALT SERPL-CCNC: 42 U/L (ref 12–78)
ANION GAP SERPL CALC-SCNC: 7 MMOL/L (ref 5–15)
AST SERPL-CCNC: 30 U/L (ref 15–37)
BASOPHILS # BLD: 0 K/UL (ref 0–0.1)
BASOPHILS NFR BLD: 0 % (ref 0–1)
BILIRUB SERPL-MCNC: 0.2 MG/DL (ref 0.2–1)
BUN SERPL-MCNC: 15 MG/DL (ref 6–20)
BUN/CREAT SERPL: 10 (ref 12–20)
CALCIUM SERPL-MCNC: 9 MG/DL (ref 8.5–10.1)
CHLORIDE SERPL-SCNC: 106 MMOL/L (ref 97–108)
CO2 SERPL-SCNC: 24 MMOL/L (ref 21–32)
COMMENT:: NORMAL
CREAT SERPL-MCNC: 1.55 MG/DL (ref 0.55–1.02)
DIFFERENTIAL METHOD BLD: ABNORMAL
EOSINOPHIL # BLD: 0.1 K/UL (ref 0–0.4)
EOSINOPHIL NFR BLD: 1 % (ref 0–7)
ERYTHROCYTE [DISTWIDTH] IN BLOOD BY AUTOMATED COUNT: 14.2 % (ref 11.5–14.5)
GLOBULIN SER CALC-MCNC: 4.1 G/DL (ref 2–4)
GLUCOSE BLD STRIP.AUTO-MCNC: 171 MG/DL (ref 65–117)
GLUCOSE SERPL-MCNC: 186 MG/DL (ref 65–100)
HCT VFR BLD AUTO: 33.5 % (ref 35–47)
HGB BLD-MCNC: 10.7 G/DL (ref 11.5–16)
IMM GRANULOCYTES # BLD AUTO: 0.2 K/UL (ref 0–0.04)
IMM GRANULOCYTES NFR BLD AUTO: 2 % (ref 0–0.5)
LYMPHOCYTES # BLD: 4 K/UL (ref 0.8–3.5)
LYMPHOCYTES NFR BLD: 34 % (ref 12–49)
MAGNESIUM SERPL-MCNC: 1.5 MG/DL (ref 1.6–2.4)
MCH RBC QN AUTO: 27.1 PG (ref 26–34)
MCHC RBC AUTO-ENTMCNC: 31.9 G/DL (ref 30–36.5)
MCV RBC AUTO: 84.8 FL (ref 80–99)
MONOCYTES # BLD: 0.6 K/UL (ref 0–1)
MONOCYTES NFR BLD: 5 % (ref 5–13)
NEUTS SEG # BLD: 6.9 K/UL (ref 1.8–8)
NEUTS SEG NFR BLD: 58 % (ref 32–75)
NRBC # BLD: 0.02 K/UL (ref 0–0.01)
NRBC BLD-RTO: 0.2 PER 100 WBC
NT PRO BNP: 43 PG/ML
PLATELET # BLD AUTO: 316 K/UL (ref 150–400)
PMV BLD AUTO: 10.8 FL (ref 8.9–12.9)
POTASSIUM SERPL-SCNC: 3.7 MMOL/L (ref 3.5–5.1)
PROT SERPL-MCNC: 7.4 G/DL (ref 6.4–8.2)
RBC # BLD AUTO: 3.95 M/UL (ref 3.8–5.2)
RBC MORPH BLD: ABNORMAL
SERVICE CMNT-IMP: ABNORMAL
SODIUM SERPL-SCNC: 137 MMOL/L (ref 136–145)
SPECIMEN HOLD: NORMAL
TROPONIN I SERPL HS-MCNC: 8 NG/L (ref 0–51)
WBC # BLD AUTO: 11.8 K/UL (ref 3.6–11)
WBC MORPH BLD: ABNORMAL

## 2023-06-27 PROCEDURE — 2580000003 HC RX 258: Performed by: EMERGENCY MEDICINE

## 2023-06-27 PROCEDURE — 96374 THER/PROPH/DIAG INJ IV PUSH: CPT

## 2023-06-27 PROCEDURE — 85025 COMPLETE CBC W/AUTO DIFF WBC: CPT

## 2023-06-27 PROCEDURE — 93005 ELECTROCARDIOGRAM TRACING: CPT | Performed by: EMERGENCY MEDICINE

## 2023-06-27 PROCEDURE — 6360000002 HC RX W HCPCS

## 2023-06-27 PROCEDURE — 36415 COLL VENOUS BLD VENIPUNCTURE: CPT

## 2023-06-27 PROCEDURE — 82962 GLUCOSE BLOOD TEST: CPT

## 2023-06-27 PROCEDURE — 6360000002 HC RX W HCPCS: Performed by: EMERGENCY MEDICINE

## 2023-06-27 PROCEDURE — 83735 ASSAY OF MAGNESIUM: CPT

## 2023-06-27 PROCEDURE — 83880 ASSAY OF NATRIURETIC PEPTIDE: CPT

## 2023-06-27 PROCEDURE — 80053 COMPREHEN METABOLIC PANEL: CPT

## 2023-06-27 PROCEDURE — 84484 ASSAY OF TROPONIN QUANT: CPT

## 2023-06-27 PROCEDURE — 99284 EMERGENCY DEPT VISIT MOD MDM: CPT

## 2023-06-27 RX ORDER — MIDAZOLAM HYDROCHLORIDE 1 MG/ML
5 INJECTION, SOLUTION INTRAMUSCULAR; INTRAVENOUS ONCE
Status: DISCONTINUED | OUTPATIENT
Start: 2023-06-27 | End: 2023-06-28 | Stop reason: HOSPADM

## 2023-06-27 RX ORDER — ADENOSINE 3 MG/ML
12 INJECTION, SOLUTION INTRAVENOUS ONCE
Status: COMPLETED | OUTPATIENT
Start: 2023-06-27 | End: 2023-06-27

## 2023-06-27 RX ORDER — MIDAZOLAM HYDROCHLORIDE 5 MG/ML
INJECTION, SOLUTION INTRAMUSCULAR; INTRAVENOUS
Status: COMPLETED
Start: 2023-06-27 | End: 2023-06-27

## 2023-06-27 RX ORDER — ADENOSINE 3 MG/ML
6 INJECTION, SOLUTION INTRAVENOUS ONCE
Status: COMPLETED | OUTPATIENT
Start: 2023-06-27 | End: 2023-06-27

## 2023-06-27 RX ORDER — ADENOSINE 3 MG/ML
INJECTION, SOLUTION INTRAVENOUS
Status: DISCONTINUED
Start: 2023-06-27 | End: 2023-06-28 | Stop reason: HOSPADM

## 2023-06-27 RX ORDER — 0.9 % SODIUM CHLORIDE 0.9 %
1000 INTRAVENOUS SOLUTION INTRAVENOUS ONCE
Status: COMPLETED | OUTPATIENT
Start: 2023-06-27 | End: 2023-06-28

## 2023-06-27 RX ADMIN — ADENOSINE 6 MG: 3 INJECTION, SOLUTION INTRAVENOUS at 22:44

## 2023-06-27 RX ADMIN — ADENOSINE 12 MG: 3 INJECTION, SOLUTION INTRAVENOUS at 22:47

## 2023-06-27 RX ADMIN — SODIUM CHLORIDE 1000 ML: 9 INJECTION, SOLUTION INTRAVENOUS at 22:43

## 2023-06-27 RX ADMIN — MIDAZOLAM 5 MG: 5 INJECTION INTRAMUSCULAR; INTRAVENOUS at 22:51

## 2023-06-28 VITALS
TEMPERATURE: 98.2 F | HEIGHT: 65 IN | RESPIRATION RATE: 14 BRPM | WEIGHT: 290 LBS | BODY MASS INDEX: 48.32 KG/M2 | DIASTOLIC BLOOD PRESSURE: 54 MMHG | HEART RATE: 76 BPM | SYSTOLIC BLOOD PRESSURE: 116 MMHG | OXYGEN SATURATION: 93 %

## 2023-06-28 LAB — TROPONIN I SERPL HS-MCNC: 11 NG/L (ref 0–51)

## 2023-06-28 PROCEDURE — 36415 COLL VENOUS BLD VENIPUNCTURE: CPT

## 2023-06-28 PROCEDURE — 84484 ASSAY OF TROPONIN QUANT: CPT

## 2023-06-28 ASSESSMENT — ENCOUNTER SYMPTOMS
CHEST TIGHTNESS: 1
SHORTNESS OF BREATH: 1

## 2023-06-29 LAB
EKG ATRIAL RATE: 103 BPM
EKG ATRIAL RATE: 250 BPM
EKG DIAGNOSIS: NORMAL
EKG DIAGNOSIS: NORMAL
EKG P AXIS: 30 DEGREES
EKG P-R INTERVAL: 200 MS
EKG Q-T INTERVAL: 232 MS
EKG Q-T INTERVAL: 342 MS
EKG QRS DURATION: 72 MS
EKG QRS DURATION: 88 MS
EKG QTC CALCULATION (BAZETT): 412 MS
EKG QTC CALCULATION (BAZETT): 448 MS
EKG R AXIS: -13 DEGREES
EKG R AXIS: -3 DEGREES
EKG T AXIS: 16 DEGREES
EKG T AXIS: 184 DEGREES
EKG VENTRICULAR RATE: 103 BPM
EKG VENTRICULAR RATE: 190 BPM

## 2023-07-11 ENCOUNTER — OFFICE VISIT (OUTPATIENT)
Age: 38
End: 2023-07-11
Payer: COMMERCIAL

## 2023-07-11 VITALS
DIASTOLIC BLOOD PRESSURE: 90 MMHG | SYSTOLIC BLOOD PRESSURE: 160 MMHG | OXYGEN SATURATION: 98 % | HEART RATE: 82 BPM | HEIGHT: 65 IN | BODY MASS INDEX: 47.82 KG/M2 | WEIGHT: 287 LBS

## 2023-07-11 DIAGNOSIS — E11.22 TYPE 2 DIABETES MELLITUS WITH CHRONIC KIDNEY DISEASE, WITHOUT LONG-TERM CURRENT USE OF INSULIN, UNSPECIFIED CKD STAGE (HCC): ICD-10-CM

## 2023-07-11 DIAGNOSIS — I47.1 SVT (SUPRAVENTRICULAR TACHYCARDIA) (HCC): ICD-10-CM

## 2023-07-11 DIAGNOSIS — I10 ESSENTIAL HYPERTENSION: ICD-10-CM

## 2023-07-11 DIAGNOSIS — I10 ESSENTIAL HYPERTENSION: Primary | ICD-10-CM

## 2023-07-11 PROCEDURE — 3077F SYST BP >= 140 MM HG: CPT | Performed by: INTERNAL MEDICINE

## 2023-07-11 PROCEDURE — 3080F DIAST BP >= 90 MM HG: CPT | Performed by: INTERNAL MEDICINE

## 2023-07-11 PROCEDURE — 99204 OFFICE O/P NEW MOD 45 MIN: CPT | Performed by: INTERNAL MEDICINE

## 2023-07-11 RX ORDER — VALSARTAN 160 MG/1
160 TABLET ORAL DAILY
Qty: 90 TABLET | Refills: 3 | Status: SHIPPED | OUTPATIENT
Start: 2023-07-11

## 2023-07-11 RX ORDER — ROSUVASTATIN CALCIUM 5 MG/1
5 TABLET, COATED ORAL DAILY
Qty: 90 TABLET | Refills: 3 | Status: SHIPPED | OUTPATIENT
Start: 2023-07-11

## 2023-07-11 RX ORDER — METOPROLOL SUCCINATE 25 MG/1
25 TABLET, EXTENDED RELEASE ORAL DAILY
Qty: 90 TABLET | Refills: 3 | Status: SHIPPED | OUTPATIENT
Start: 2023-07-11

## 2023-07-11 NOTE — PATIENT INSTRUCTIONS
Stop amlodipine    Start Toprol-XL 25 mg once a day    Increase valsartan to 160 mg daily, new prescription sent to pharmacy. Schedule echo in our office    Obtain coronary calcium score, out-of-pocket test, please call the number provided to schedule. I ordered labs-lipid profile, thyroid function, hemoglobin [id number] of diabetes. Please keep track of your heart rate and blood pressures at home.

## 2023-10-18 ENCOUNTER — OFFICE VISIT (OUTPATIENT)
Age: 38
End: 2023-10-18

## 2023-10-18 VITALS
WEIGHT: 279 LBS | HEART RATE: 67 BPM | HEIGHT: 65 IN | DIASTOLIC BLOOD PRESSURE: 86 MMHG | SYSTOLIC BLOOD PRESSURE: 154 MMHG | BODY MASS INDEX: 46.48 KG/M2 | OXYGEN SATURATION: 98 %

## 2023-10-18 DIAGNOSIS — E11.22 TYPE 2 DIABETES MELLITUS WITH CHRONIC KIDNEY DISEASE, WITHOUT LONG-TERM CURRENT USE OF INSULIN, UNSPECIFIED CKD STAGE (HCC): ICD-10-CM

## 2023-10-18 DIAGNOSIS — R07.9 CHEST PAIN, UNSPECIFIED TYPE: Primary | ICD-10-CM

## 2023-10-18 DIAGNOSIS — I47.10 SVT (SUPRAVENTRICULAR TACHYCARDIA): ICD-10-CM

## 2023-10-18 DIAGNOSIS — M54.2 NECK PAIN ON LEFT SIDE: ICD-10-CM

## 2023-10-18 DIAGNOSIS — I10 ESSENTIAL HYPERTENSION: ICD-10-CM

## 2023-10-18 RX ORDER — GLIMEPIRIDE 2 MG/1
2 TABLET ORAL 2 TIMES DAILY
Qty: 180 TABLET | Refills: 3 | Status: SHIPPED | OUTPATIENT
Start: 2023-10-18

## 2023-11-23 ENCOUNTER — APPOINTMENT (OUTPATIENT)
Facility: HOSPITAL | Age: 38
End: 2023-11-23
Payer: MEDICAID

## 2023-11-23 ENCOUNTER — HOSPITAL ENCOUNTER (EMERGENCY)
Facility: HOSPITAL | Age: 38
Discharge: HOME | End: 2023-11-24
Attending: EMERGENCY MEDICINE
Payer: MEDICAID

## 2023-11-23 DIAGNOSIS — E11.65 TYPE 2 DIABETES MELLITUS WITH HYPERGLYCEMIA, WITH LONG-TERM CURRENT USE OF INSULIN (HCC): ICD-10-CM

## 2023-11-23 DIAGNOSIS — J45.901 EXACERBATION OF ASTHMA, UNSPECIFIED ASTHMA SEVERITY, UNSPECIFIED WHETHER PERSISTENT: ICD-10-CM

## 2023-11-23 DIAGNOSIS — N18.9 CHRONIC KIDNEY DISEASE, UNSPECIFIED CKD STAGE: ICD-10-CM

## 2023-11-23 DIAGNOSIS — J98.01 BRONCHOSPASM: Primary | ICD-10-CM

## 2023-11-23 DIAGNOSIS — R50.9 ACUTE FEBRILE ILLNESS: ICD-10-CM

## 2023-11-23 DIAGNOSIS — R10.9 FLANK PAIN: ICD-10-CM

## 2023-11-23 DIAGNOSIS — Z79.4 TYPE 2 DIABETES MELLITUS WITH HYPERGLYCEMIA, WITH LONG-TERM CURRENT USE OF INSULIN (HCC): ICD-10-CM

## 2023-11-23 DIAGNOSIS — D64.9 ANEMIA, UNSPECIFIED TYPE: ICD-10-CM

## 2023-11-23 LAB
ALBUMIN SERPL-MCNC: 3.1 G/DL (ref 3.5–5)
ALBUMIN/GLOB SERPL: 0.8 (ref 1.1–2.2)
ALP SERPL-CCNC: 101 U/L (ref 45–117)
ALT SERPL-CCNC: 41 U/L (ref 12–78)
ANION GAP SERPL CALC-SCNC: 9 MMOL/L (ref 5–15)
APPEARANCE UR: CLEAR
AST SERPL-CCNC: 30 U/L (ref 15–37)
BACTERIA URNS QL MICRO: ABNORMAL /HPF
BASOPHILS # BLD: 0 K/UL (ref 0–0.1)
BASOPHILS NFR BLD: 0 % (ref 0–1)
BILIRUB SERPL-MCNC: 0.2 MG/DL (ref 0.2–1)
BILIRUB UR QL: NEGATIVE
BUN SERPL-MCNC: 22 MG/DL (ref 6–20)
BUN/CREAT SERPL: 11 (ref 12–20)
CALCIUM SERPL-MCNC: 8.6 MG/DL (ref 8.5–10.1)
CHLORIDE SERPL-SCNC: 101 MMOL/L (ref 97–108)
CO2 SERPL-SCNC: 25 MMOL/L (ref 21–32)
COLOR UR: ABNORMAL
CREAT SERPL-MCNC: 2.01 MG/DL (ref 0.55–1.02)
DIFFERENTIAL METHOD BLD: ABNORMAL
EOSINOPHIL # BLD: 0.2 K/UL (ref 0–0.4)
EOSINOPHIL NFR BLD: 2 % (ref 0–7)
EPITH CASTS URNS QL MICRO: ABNORMAL /LPF
ERYTHROCYTE [DISTWIDTH] IN BLOOD BY AUTOMATED COUNT: 15 % (ref 11.5–14.5)
FLUAV AG NPH QL IA: NEGATIVE
FLUBV AG NOSE QL IA: NEGATIVE
GLOBULIN SER CALC-MCNC: 3.9 G/DL (ref 2–4)
GLUCOSE SERPL-MCNC: 243 MG/DL (ref 65–100)
GLUCOSE UR STRIP.AUTO-MCNC: NEGATIVE MG/DL
HCG UR QL: NEGATIVE
HCT VFR BLD AUTO: 27.6 % (ref 35–47)
HGB BLD-MCNC: 8.7 G/DL (ref 11.5–16)
HGB UR QL STRIP: ABNORMAL
IMM GRANULOCYTES # BLD AUTO: 0.1 K/UL (ref 0–0.04)
IMM GRANULOCYTES NFR BLD AUTO: 1 % (ref 0–0.5)
KETONES UR QL STRIP.AUTO: NEGATIVE MG/DL
LEUKOCYTE ESTERASE UR QL STRIP.AUTO: NEGATIVE
LYMPHOCYTES # BLD: 2.9 K/UL (ref 0.8–3.5)
LYMPHOCYTES NFR BLD: 26 % (ref 12–49)
MCH RBC QN AUTO: 26.4 PG (ref 26–34)
MCHC RBC AUTO-ENTMCNC: 31.5 G/DL (ref 30–36.5)
MCV RBC AUTO: 83.9 FL (ref 80–99)
MONOCYTES # BLD: 0.7 K/UL (ref 0–1)
MONOCYTES NFR BLD: 6 % (ref 5–13)
NEUTS SEG # BLD: 7.4 K/UL (ref 1.8–8)
NEUTS SEG NFR BLD: 65 % (ref 32–75)
NITRITE UR QL STRIP.AUTO: NEGATIVE
NRBC # BLD: 0 K/UL (ref 0–0.01)
NRBC BLD-RTO: 0 PER 100 WBC
PH UR STRIP: 6 (ref 5–8)
PLATELET # BLD AUTO: 323 K/UL (ref 150–400)
PMV BLD AUTO: 10.6 FL (ref 8.9–12.9)
POTASSIUM SERPL-SCNC: 4 MMOL/L (ref 3.5–5.1)
PROT SERPL-MCNC: 7 G/DL (ref 6.4–8.2)
PROT UR STRIP-MCNC: 30 MG/DL
RBC # BLD AUTO: 3.29 M/UL (ref 3.8–5.2)
RBC #/AREA URNS HPF: ABNORMAL /HPF (ref 0–5)
SARS-COV-2 RDRP RESP QL NAA+PROBE: NOT DETECTED
SODIUM SERPL-SCNC: 135 MMOL/L (ref 136–145)
SOURCE: NORMAL
SP GR UR REFRACTOMETRY: 1.01 (ref 1–1.03)
URINE CULTURE IF INDICATED: ABNORMAL
UROBILINOGEN UR QL STRIP.AUTO: 0.2 EU/DL (ref 0.2–1)
WBC # BLD AUTO: 11.4 K/UL (ref 3.6–11)
WBC URNS QL MICRO: ABNORMAL /HPF (ref 0–4)

## 2023-11-23 PROCEDURE — 71045 X-RAY EXAM CHEST 1 VIEW: CPT

## 2023-11-23 PROCEDURE — 96374 THER/PROPH/DIAG INJ IV PUSH: CPT

## 2023-11-23 PROCEDURE — 6360000002 HC RX W HCPCS: Performed by: EMERGENCY MEDICINE

## 2023-11-23 PROCEDURE — 6370000000 HC RX 637 (ALT 250 FOR IP): Performed by: EMERGENCY MEDICINE

## 2023-11-23 PROCEDURE — 2580000003 HC RX 258: Performed by: EMERGENCY MEDICINE

## 2023-11-23 PROCEDURE — 81001 URINALYSIS AUTO W/SCOPE: CPT

## 2023-11-23 PROCEDURE — 85025 COMPLETE CBC W/AUTO DIFF WBC: CPT

## 2023-11-23 PROCEDURE — 80053 COMPREHEN METABOLIC PANEL: CPT

## 2023-11-23 PROCEDURE — 81025 URINE PREGNANCY TEST: CPT

## 2023-11-23 PROCEDURE — 96361 HYDRATE IV INFUSION ADD-ON: CPT

## 2023-11-23 PROCEDURE — 87804 INFLUENZA ASSAY W/OPTIC: CPT

## 2023-11-23 PROCEDURE — 87635 SARS-COV-2 COVID-19 AMP PRB: CPT

## 2023-11-23 PROCEDURE — 74176 CT ABD & PELVIS W/O CONTRAST: CPT

## 2023-11-23 PROCEDURE — 99284 EMERGENCY DEPT VISIT MOD MDM: CPT

## 2023-11-23 RX ORDER — ACETAMINOPHEN 325 MG/1
650 TABLET ORAL
Status: COMPLETED | OUTPATIENT
Start: 2023-11-23 | End: 2023-11-23

## 2023-11-23 RX ORDER — CETIRIZINE HYDROCHLORIDE 10 MG/1
10 TABLET ORAL DAILY
Qty: 30 TABLET | Refills: 0 | Status: SHIPPED | OUTPATIENT
Start: 2023-11-23 | End: 2023-12-23

## 2023-11-23 RX ORDER — GUAIFENESIN 600 MG/1
600 TABLET, EXTENDED RELEASE ORAL 2 TIMES DAILY
Qty: 30 TABLET | Refills: 0 | Status: SHIPPED | OUTPATIENT
Start: 2023-11-23 | End: 2023-12-08

## 2023-11-23 RX ORDER — IPRATROPIUM BROMIDE AND ALBUTEROL SULFATE 2.5; .5 MG/3ML; MG/3ML
1 SOLUTION RESPIRATORY (INHALATION)
Status: COMPLETED | OUTPATIENT
Start: 2023-11-23 | End: 2023-11-23

## 2023-11-23 RX ORDER — DEXAMETHASONE SODIUM PHOSPHATE 10 MG/ML
10 INJECTION, SOLUTION INTRAMUSCULAR; INTRAVENOUS ONCE
Status: COMPLETED | OUTPATIENT
Start: 2023-11-23 | End: 2023-11-23

## 2023-11-23 RX ORDER — 0.9 % SODIUM CHLORIDE 0.9 %
1000 INTRAVENOUS SOLUTION INTRAVENOUS ONCE
Status: COMPLETED | OUTPATIENT
Start: 2023-11-23 | End: 2023-11-24

## 2023-11-23 RX ORDER — ONDANSETRON 4 MG/1
4 TABLET, ORALLY DISINTEGRATING ORAL EVERY 8 HOURS PRN
Qty: 30 TABLET | Refills: 0 | Status: SHIPPED | OUTPATIENT
Start: 2023-11-23

## 2023-11-23 RX ORDER — FLUTICASONE PROPIONATE 50 MCG
2 SPRAY, SUSPENSION (ML) NASAL DAILY
Qty: 32 G | Refills: 1 | Status: SHIPPED | OUTPATIENT
Start: 2023-11-23

## 2023-11-23 RX ORDER — ALBUTEROL SULFATE 90 UG/1
2 AEROSOL, METERED RESPIRATORY (INHALATION) EVERY 4 HOURS PRN
Qty: 18 G | Refills: 0 | Status: SHIPPED | OUTPATIENT
Start: 2023-11-23

## 2023-11-23 RX ORDER — DEXAMETHASONE 6 MG/1
12 TABLET ORAL ONCE
Qty: 2 TABLET | Refills: 0 | Status: SHIPPED | OUTPATIENT
Start: 2023-11-25 | End: 2023-11-25

## 2023-11-23 RX ADMIN — IPRATROPIUM BROMIDE AND ALBUTEROL SULFATE 1 DOSE: .5; 3 SOLUTION RESPIRATORY (INHALATION) at 23:18

## 2023-11-23 RX ADMIN — DEXAMETHASONE SODIUM PHOSPHATE 10 MG: 10 INJECTION, SOLUTION INTRAMUSCULAR; INTRAVENOUS at 23:18

## 2023-11-23 RX ADMIN — ACETAMINOPHEN 650 MG: 325 TABLET ORAL at 22:29

## 2023-11-23 RX ADMIN — SODIUM CHLORIDE 1000 ML: 9 INJECTION, SOLUTION INTRAVENOUS at 22:31

## 2023-11-23 ASSESSMENT — PAIN - FUNCTIONAL ASSESSMENT: PAIN_FUNCTIONAL_ASSESSMENT: NONE - DENIES PAIN

## 2023-11-24 VITALS
HEIGHT: 65 IN | DIASTOLIC BLOOD PRESSURE: 78 MMHG | RESPIRATION RATE: 20 BRPM | OXYGEN SATURATION: 95 % | TEMPERATURE: 100.6 F | BODY MASS INDEX: 45.62 KG/M2 | WEIGHT: 273.81 LBS | HEART RATE: 99 BPM | SYSTOLIC BLOOD PRESSURE: 168 MMHG

## 2023-11-24 NOTE — ED TRIAGE NOTES
Patient ambulatory to ED treatment room with a steady gait, w c/o intermittent R flank pain for one week, with difficulty voiding. Yesterday, patient began with generalized muscle cramping and aching. Patient had a fever today of 102    Patient has sore throat, nasal congestion.

## 2023-11-24 NOTE — ED NOTES
Patient discharged to home with prescriptions sent to pharmacy, and patient has verbalized understanding of discharge instructions and follow up. Patient ambulatory out of the ED with a steady gait with family.       Daily, Juanjose Fox RN  11/24/23 0000

## 2023-11-24 NOTE — ED PROVIDER NOTES
[facility] EMERGENCY DEPT  EMERGENCY DEPARTMENT ENCOUNTER      Pt Name: Lucy Wilson  MRN: [id number]  [address] [facility] 1985  Date of evaluation: 11/23/2023  Provider: Juan C Danielle       Chief Complaint   Patient presents with    Flank Pain         HISTORY OF PRESENT ILLNESS   (Location/Symptom, Timing/Onset, Context/Setting, Quality, Duration, Modifying Factors, Severity)  Note limiting factors. HPI      Review of External Medical Records:     Nursing Notes were reviewed. REVIEW OF SYSTEMS    (2-9 systems for level 4, 10 or more for level 5)     Review of Systems    Except as noted above the remainder of the review of systems was reviewed and negative. PAST MEDICAL HISTORY     Past Medical History:   Diagnosis Date    VENICE (acute kidney injury) ([address])     Asthma     Chest pain 10/18/2023    COVID     Diabetes ([address])     gestational diabetes    Essential hypertension     seen today for elevated blood pressure    Leukocytosis     Staph aureus infection 2006    over abdomen         SURGICAL HISTORY       Past Surgical History:   Procedure Laterality Date    BREAST REDUCTION SURGERY      BREAST SURGERY      CHOLECYSTECTOMY      TONSILLECTOMY           CURRENT MEDICATIONS       Previous Medications    ALBUTEROL SULFATE HFA (PROVENTIL;VENTOLIN;PROAIR) 108 (90 BASE) MCG/ACT INHALER    Inhale into the lungs    GLIMEPIRIDE (AMARYL) 2 MG TABLET    Take 1 tablet by mouth 2 times daily    METOPROLOL SUCCINATE (TOPROL XL) 25 MG EXTENDED RELEASE TABLET    Take 1 tablet by mouth daily    ROSUVASTATIN (CRESTOR) 5 MG TABLET    Take 1 tablet by mouth daily    VALSARTAN (DIOVAN) 160 MG TABLET    Take 1 tablet by mouth daily       ALLERGIES     Cephalexin, Chlorzoxazone, Clarithromycin, Penicillins, and Codeine    FAMILY HISTORY     History reviewed. No pertinent family history.        SOCIAL HISTORY       Social History     Socioeconomic History    Marital status:      Spouse name: None
MG/DL    ALT 41 12 - 78 U/L    AST 30 15 - 37 U/L    Alk Phosphatase 101 45 - 117 U/L    Total Protein 7.0 6.4 - 8.2 g/dL    Albumin 3.1 (L) 3.5 - 5.0 g/dL    Globulin 3.9 2.0 - 4.0 g/dL    Albumin/Globulin Ratio 0.8 (L) 1.1 - 2.2     POC Pregnancy Urine Qual   Result Value Ref Range    Preg Test, Ur Negative NEG           XR CHEST PORTABLE   Final Result      No acute process. CT ABDOMEN PELVIS WO CONTRAST Additional Contrast? None   Final Result   No  calculus or hydronephrosis. Hepatomegaly and hepatic steatosis. No other   acute abnormality in the abdomen or pelvis.                     Katelin Sommer MD  11/23/23 4935

## 2023-11-24 NOTE — ED NOTES
Patient tolerating breathing treatment, coughing. Patient medicated per providers orders, tolerated well.       Daily, Tabitha Avilez, [address]  11/23/23 4433

## 2023-11-24 NOTE — ED NOTES
Patient resting comfortably in stretcher with call light within reach, and lights dimmed for comfort. Fluids infusing without difficulty.       Daily, Lady Steven RN  11/23/23 3492

## 2023-12-16 ENCOUNTER — HOSPITAL ENCOUNTER (EMERGENCY)
Facility: HOSPITAL | Age: 38
Discharge: HOME | End: 2023-12-17
Attending: EMERGENCY MEDICINE
Payer: MEDICAID

## 2023-12-16 VITALS
DIASTOLIC BLOOD PRESSURE: 78 MMHG | SYSTOLIC BLOOD PRESSURE: 195 MMHG | RESPIRATION RATE: 20 BRPM | TEMPERATURE: 98.2 F | HEART RATE: 78 BPM | OXYGEN SATURATION: 98 %

## 2023-12-16 DIAGNOSIS — J01.81 OTHER ACUTE RECURRENT SINUSITIS: Primary | ICD-10-CM

## 2023-12-16 PROCEDURE — 6370000000 HC RX 637 (ALT 250 FOR IP): Performed by: EMERGENCY MEDICINE

## 2023-12-16 RX ORDER — SULFAMETHOXAZOLE AND TRIMETHOPRIM 800; 160 MG/1; MG/1
1 TABLET ORAL
Status: COMPLETED | OUTPATIENT
Start: 2023-12-16 | End: 2023-12-16

## 2023-12-16 RX ORDER — NAPROXEN 250 MG/1
500 TABLET ORAL
Status: COMPLETED | OUTPATIENT
Start: 2023-12-16 | End: 2023-12-16

## 2023-12-16 RX ADMIN — SULFAMETHOXAZOLE AND TRIMETHOPRIM 1 TABLET: 800; 160 TABLET ORAL at 23:59

## 2023-12-16 RX ADMIN — NAPROXEN 500 MG: 250 TABLET ORAL at 23:59

## 2023-12-16 ASSESSMENT — PAIN SCALES - GENERAL: PAINLEVEL_OUTOF10: 7

## 2023-12-16 ASSESSMENT — PAIN DESCRIPTION - LOCATION: LOCATION: FACE

## 2023-12-16 ASSESSMENT — PAIN - FUNCTIONAL ASSESSMENT
PAIN_FUNCTIONAL_ASSESSMENT: 0-10
PAIN_FUNCTIONAL_ASSESSMENT: PREVENTS OR INTERFERES SOME ACTIVE ACTIVITIES AND ADLS

## 2023-12-16 ASSESSMENT — PAIN DESCRIPTION - FREQUENCY: FREQUENCY: INTERMITTENT

## 2023-12-16 ASSESSMENT — PAIN DESCRIPTION - DESCRIPTORS: DESCRIPTORS: ACHING

## 2023-12-16 ASSESSMENT — PAIN DESCRIPTION - ONSET: ONSET: ON-GOING

## 2023-12-17 RX ORDER — OXYMETAZOLINE HYDROCHLORIDE 0.05 G/100ML
2 SPRAY NASAL 2 TIMES DAILY
Qty: 1 EACH | Refills: 3 | Status: SHIPPED | OUTPATIENT
Start: 2023-12-17 | End: 2024-01-16

## 2023-12-17 RX ORDER — NAPROXEN 500 MG/1
500 TABLET ORAL 2 TIMES DAILY WITH MEALS
Qty: 60 TABLET | Refills: 0 | Status: SHIPPED | OUTPATIENT
Start: 2023-12-17

## 2023-12-17 RX ORDER — SULFAMETHOXAZOLE AND TRIMETHOPRIM 800; 160 MG/1; MG/1
1 TABLET ORAL 2 TIMES DAILY
Qty: 20 TABLET | Refills: 0 | Status: SHIPPED | OUTPATIENT
Start: 2023-12-17 | End: 2023-12-27

## 2023-12-17 RX ORDER — CETIRIZINE HYDROCHLORIDE 10 MG/1
10 TABLET ORAL DAILY
Qty: 30 TABLET | Refills: 0 | Status: SHIPPED | OUTPATIENT
Start: 2023-12-17 | End: 2024-01-16

## 2023-12-17 NOTE — ED NOTES
Pt educated on medication management and home care. All questions answered att.       Rubia Lucia RN  12/17/23 0011

## 2023-12-17 NOTE — ED TRIAGE NOTES
Pt reports sinus pressure and headache for the past month and has worsened over the past couple of days.

## 2024-01-16 ENCOUNTER — TELEPHONE (OUTPATIENT)
Age: 39
End: 2024-01-16

## 2024-01-16 NOTE — TELEPHONE ENCOUNTER
Pt is calling because she has had a severe headache all morning and yesterday she had some chest pain intermittent.    Pt said she also had some pain in her neck.Pt said she has been using OTC medication.Pt would like to know what the doctor recommends.    [phone] cell

## 2024-01-17 NOTE — TELEPHONE ENCOUNTER
Hr high rate on watch spiked to 126 sitting, pain in neck came when hr up felt like tightness, cp on the right side. Unable to check BP at the time as she was at work without a cuff.  Also had a headache at the time confined to the base of the skull. Feels better today. Patient was question if there is something she should be doing or if her blood pressure had spiked.     Stress test not completed as patient lost her health insurance.  Medicaid now and new insurance in March.  Checking to see if her medicaid covers her testing and will get her schedule. Patient will keep her cuff with her for potential future episode. Patient will monitor her BP regularly and keep record.

## 2024-01-17 NOTE — TELEPHONE ENCOUNTER
Returned call to patient and stress test was scheduled along with a follow up.  Instructions requested via email and sent.

## 2024-01-22 ENCOUNTER — TELEPHONE (OUTPATIENT)
Age: 39
End: 2024-01-22

## 2024-01-22 NOTE — TELEPHONE ENCOUNTER
Pt needs instructions for her upcoming [facility]    [phone]

## 2024-01-24 ENCOUNTER — ANCILLARY PROCEDURE (OUTPATIENT)
Age: 39
End: 2024-01-24
Payer: MEDICAID

## 2024-01-24 VITALS — BODY MASS INDEX: 45.48 KG/M2 | WEIGHT: 273 LBS | HEIGHT: 65 IN

## 2024-01-24 DIAGNOSIS — I47.10 SVT (SUPRAVENTRICULAR TACHYCARDIA): ICD-10-CM

## 2024-01-24 DIAGNOSIS — I10 ESSENTIAL HYPERTENSION: ICD-10-CM

## 2024-01-24 DIAGNOSIS — M54.2 NECK PAIN ON LEFT SIDE: ICD-10-CM

## 2024-01-24 DIAGNOSIS — E11.22 TYPE 2 DIABETES MELLITUS WITH CHRONIC KIDNEY DISEASE, WITHOUT LONG-TERM CURRENT USE OF INSULIN, UNSPECIFIED CKD STAGE (HCC): ICD-10-CM

## 2024-01-24 DIAGNOSIS — R07.9 CHEST PAIN, UNSPECIFIED TYPE: ICD-10-CM

## 2024-01-24 PROCEDURE — 78452 HT MUSCLE IMAGE SPECT MULT: CPT | Performed by: INTERNAL MEDICINE

## 2024-01-24 PROCEDURE — A9500 TC99M SESTAMIBI: HCPCS | Performed by: INTERNAL MEDICINE

## 2024-01-24 RX ORDER — TETRAKIS(2-METHOXYISOBUTYLISOCYANIDE)COPPER(I) TETRAFLUOROBORATE 1 MG/ML
25.5 INJECTION, POWDER, LYOPHILIZED, FOR SOLUTION INTRAVENOUS
Status: COMPLETED | OUTPATIENT
Start: 2024-01-24 | End: 2024-01-24

## 2024-01-24 RX ADMIN — TECHNETIUM TC-99M SESTAMIBI 25.5 MILLICURIE: 1 INJECTION INTRAVENOUS at 09:05

## 2024-01-25 LAB
ECHO BSA: 2.38 M2
NUC STRESS EJECTION FRACTION: 54 %
STRESS ANGINA INDEX: 0
STRESS BASELINE DIAS BP: 82 MMHG
STRESS BASELINE HR: 88 BPM
STRESS BASELINE ST DEPRESSION: 0 MM
STRESS BASELINE SYS BP: 162 MMHG
STRESS ESTIMATED WORKLOAD: 7 METS
STRESS EXERCISE DUR MIN: 5 MIN
STRESS EXERCISE DUR SEC: 0 SEC
STRESS O2 SAT PEAK: 96 %
STRESS O2 SAT REST: 96 %
STRESS PEAK DIAS BP: 78 MMHG
STRESS PEAK SYS BP: 238 MMHG
STRESS PERCENT HR ACHIEVED: 94 %
STRESS POST PEAK HR: 169 BPM
STRESS RATE PRESSURE PRODUCT: NORMAL BPM*MMHG
STRESS SR DUKE TREADMILL SCORE: 5
STRESS ST DEPRESSION: 0 MM
STRESS TARGET HR: 180 BPM
TID: 1.02

## 2024-01-25 PROCEDURE — PBSHW PBB SHADOW CHARGE: Performed by: INTERNAL MEDICINE

## 2024-01-25 PROCEDURE — 78452 HT MUSCLE IMAGE SPECT MULT: CPT | Performed by: INTERNAL MEDICINE

## 2024-01-25 PROCEDURE — 93018 CV STRESS TEST I&R ONLY: CPT | Performed by: INTERNAL MEDICINE

## 2024-01-25 PROCEDURE — A9500 TC99M SESTAMIBI: HCPCS | Performed by: INTERNAL MEDICINE

## 2024-01-25 PROCEDURE — 93016 CV STRESS TEST SUPVJ ONLY: CPT | Performed by: INTERNAL MEDICINE

## 2024-01-25 RX ORDER — TETRAKIS(2-METHOXYISOBUTYLISOCYANIDE)COPPER(I) TETRAFLUOROBORATE 1 MG/ML
25 INJECTION, POWDER, LYOPHILIZED, FOR SOLUTION INTRAVENOUS
Status: COMPLETED | OUTPATIENT
Start: 2024-01-25 | End: 2024-01-25

## 2024-01-25 RX ADMIN — TECHNETIUM TC-99M SESTAMIBI 25 MILLICURIE: 1 INJECTION INTRAVENOUS at 08:45

## 2024-01-29 ENCOUNTER — PATIENT MESSAGE (OUTPATIENT)
Age: 39
End: 2024-01-29

## 2024-01-30 NOTE — RESULT ENCOUNTER NOTE
Good news, your nuclear stress test was normal.    Please call with office with further questions.    Dr. Reddy

## 2024-05-24 ENCOUNTER — HOSPITAL ENCOUNTER (EMERGENCY)
Facility: HOSPITAL | Age: 39
End: 2024-05-24
Payer: COMMERCIAL

## 2024-05-24 ENCOUNTER — HOSPITAL ENCOUNTER (EMERGENCY)
Facility: HOSPITAL | Age: 39
Discharge: HOME | End: 2024-05-24
Attending: EMERGENCY MEDICINE
Payer: COMMERCIAL

## 2024-05-24 ENCOUNTER — OFFICE VISIT (OUTPATIENT)
Age: 39
End: 2024-05-24

## 2024-05-24 VITALS
OXYGEN SATURATION: 98 % | HEIGHT: 65 IN | DIASTOLIC BLOOD PRESSURE: 85 MMHG | RESPIRATION RATE: 20 BRPM | TEMPERATURE: 98.4 F | WEIGHT: 267 LBS | BODY MASS INDEX: 44.48 KG/M2 | HEART RATE: 81 BPM | SYSTOLIC BLOOD PRESSURE: 168 MMHG

## 2024-05-24 VITALS
SYSTOLIC BLOOD PRESSURE: 210 MMHG | OXYGEN SATURATION: 96 % | WEIGHT: 267 LBS | HEART RATE: 82 BPM | HEIGHT: 65 IN | DIASTOLIC BLOOD PRESSURE: 96 MMHG | BODY MASS INDEX: 44.48 KG/M2 | RESPIRATION RATE: 16 BRPM

## 2024-05-24 DIAGNOSIS — Q28.2 AVM (ARTERIOVENOUS MALFORMATION) BRAIN: ICD-10-CM

## 2024-05-24 DIAGNOSIS — M54.12 CERVICAL RADICULOPATHY: Primary | ICD-10-CM

## 2024-05-24 DIAGNOSIS — I16.1 HYPERTENSIVE EMERGENCY: Primary | ICD-10-CM

## 2024-05-24 DIAGNOSIS — I10 ESSENTIAL HYPERTENSION: ICD-10-CM

## 2024-05-24 LAB
ALBUMIN SERPL-MCNC: 3.2 G/DL (ref 3.5–5)
ALBUMIN/GLOB SERPL: 0.7 (ref 1.1–2.2)
ALP SERPL-CCNC: 122 U/L (ref 45–117)
ALT SERPL-CCNC: 62 U/L (ref 12–78)
ANION GAP SERPL CALC-SCNC: 7 MMOL/L (ref 5–15)
AST SERPL-CCNC: 68 U/L (ref 15–37)
BASOPHILS # BLD: 0 K/UL (ref 0–0.1)
BASOPHILS NFR BLD: 0 % (ref 0–1)
BILIRUB SERPL-MCNC: 0.3 MG/DL (ref 0.2–1)
BUN SERPL-MCNC: 19 MG/DL (ref 6–20)
BUN/CREAT SERPL: 10 (ref 12–20)
CALCIUM SERPL-MCNC: 9.4 MG/DL (ref 8.5–10.1)
CHLORIDE SERPL-SCNC: 108 MMOL/L (ref 97–108)
CO2 SERPL-SCNC: 22 MMOL/L (ref 21–32)
COMMENT:: NORMAL
CREAT SERPL-MCNC: 1.92 MG/DL (ref 0.55–1.02)
DIFFERENTIAL METHOD BLD: ABNORMAL
EOSINOPHIL # BLD: 0.2 K/UL (ref 0–0.4)
EOSINOPHIL NFR BLD: 2 % (ref 0–7)
ERYTHROCYTE [DISTWIDTH] IN BLOOD BY AUTOMATED COUNT: 18.2 % (ref 11.5–14.5)
GLOBULIN SER CALC-MCNC: 4.8 G/DL (ref 2–4)
GLUCOSE SERPL-MCNC: 234 MG/DL (ref 65–100)
HCT VFR BLD AUTO: 34.7 % (ref 35–47)
HGB BLD-MCNC: 10.9 G/DL (ref 11.5–16)
IMM GRANULOCYTES # BLD AUTO: 0.1 K/UL (ref 0–0.04)
IMM GRANULOCYTES NFR BLD AUTO: 1 % (ref 0–0.5)
LYMPHOCYTES # BLD: 3.1 K/UL (ref 0.8–3.5)
LYMPHOCYTES NFR BLD: 34 % (ref 12–49)
MCH RBC QN AUTO: 24.1 PG (ref 26–34)
MCHC RBC AUTO-ENTMCNC: 31.4 G/DL (ref 30–36.5)
MCV RBC AUTO: 76.8 FL (ref 80–99)
MONOCYTES # BLD: 0.4 K/UL (ref 0–1)
MONOCYTES NFR BLD: 5 % (ref 5–13)
NEUTS SEG # BLD: 5.5 K/UL (ref 1.8–8)
NEUTS SEG NFR BLD: 58 % (ref 32–75)
NRBC # BLD: 0 K/UL (ref 0–0.01)
NRBC BLD-RTO: 0 PER 100 WBC
PLATELET # BLD AUTO: 261 K/UL (ref 150–400)
PMV BLD AUTO: 10.8 FL (ref 8.9–12.9)
POTASSIUM SERPL-SCNC: 4.2 MMOL/L (ref 3.5–5.1)
PROT SERPL-MCNC: 8 G/DL (ref 6.4–8.2)
RBC # BLD AUTO: 4.52 M/UL (ref 3.8–5.2)
SODIUM SERPL-SCNC: 137 MMOL/L (ref 136–145)
SPECIMEN HOLD: NORMAL
TROPONIN I SERPL HS-MCNC: 5 NG/L (ref 0–51)
WBC # BLD AUTO: 9.3 K/UL (ref 3.6–11)

## 2024-05-24 PROCEDURE — 6360000004 HC RX CONTRAST MEDICATION: Performed by: EMERGENCY MEDICINE

## 2024-05-24 PROCEDURE — 70450 CT HEAD/BRAIN W/O DYE: CPT

## 2024-05-24 PROCEDURE — 6360000002 HC RX W HCPCS: Performed by: EMERGENCY MEDICINE

## 2024-05-24 PROCEDURE — 99284 EMERGENCY DEPT VISIT MOD MDM: CPT

## 2024-05-24 PROCEDURE — 96374 THER/PROPH/DIAG INJ IV PUSH: CPT

## 2024-05-24 PROCEDURE — 70496 CT ANGIOGRAPHY HEAD: CPT

## 2024-05-24 PROCEDURE — 80053 COMPREHEN METABOLIC PANEL: CPT

## 2024-05-24 PROCEDURE — 84484 ASSAY OF TROPONIN QUANT: CPT

## 2024-05-24 PROCEDURE — 85025 COMPLETE CBC W/AUTO DIFF WBC: CPT

## 2024-05-24 PROCEDURE — 36415 COLL VENOUS BLD VENIPUNCTURE: CPT

## 2024-05-24 RX ORDER — HYDRALAZINE HYDROCHLORIDE 20 MG/ML
10 INJECTION INTRAMUSCULAR; INTRAVENOUS
Status: DISCONTINUED | OUTPATIENT
Start: 2024-05-24 | End: 2024-05-24

## 2024-05-24 RX ORDER — NAPROXEN 500 MG/1
500 TABLET ORAL 2 TIMES DAILY WITH MEALS
Qty: 28 TABLET | Refills: 0 | Status: SHIPPED | OUTPATIENT
Start: 2024-05-24 | End: 2024-06-07

## 2024-05-24 RX ORDER — HYDRALAZINE HYDROCHLORIDE 20 MG/ML
10 INJECTION INTRAMUSCULAR; INTRAVENOUS
Status: COMPLETED | OUTPATIENT
Start: 2024-05-24 | End: 2024-05-24

## 2024-05-24 RX ORDER — METHOCARBAMOL 500 MG/1
750 TABLET, FILM COATED ORAL PRN
Qty: 45 TABLET | Refills: 0 | Status: SHIPPED | OUTPATIENT
Start: 2024-05-24 | End: 2024-06-03

## 2024-05-24 RX ADMIN — IOPAMIDOL 90 ML: 755 INJECTION, SOLUTION INTRAVENOUS at 14:50

## 2024-05-24 RX ADMIN — HYDRALAZINE HYDROCHLORIDE 10 MG: 20 INJECTION INTRAMUSCULAR; INTRAVENOUS at 14:57

## 2024-05-24 ASSESSMENT — ENCOUNTER SYMPTOMS
SHORTNESS OF BREATH: 0
VISUAL CHANGE: 0
DIARRHEA: 0
COLOR CHANGE: 0
BACK PAIN: 0
VOMITING: 0
CONSTIPATION: 0
ABDOMINAL PAIN: 0
NAUSEA: 0

## 2024-05-24 ASSESSMENT — PAIN - FUNCTIONAL ASSESSMENT: PAIN_FUNCTIONAL_ASSESSMENT: 0-10

## 2024-05-24 ASSESSMENT — PAIN DESCRIPTION - LOCATION: LOCATION: NECK

## 2024-05-24 ASSESSMENT — PAIN DESCRIPTION - ORIENTATION: ORIENTATION: RIGHT

## 2024-05-24 ASSESSMENT — PAIN SCALES - GENERAL: PAINLEVEL_OUTOF10: 5

## 2024-05-24 NOTE — ED PROVIDER NOTES
urgency versus emergency, delayed presentation CVA, cerebral aneurysm, cervical radiculopathy.  Plan to obtain CMP, CBC, EKG, head CT and CTA, cardiac enzymes.  We will reassess, and make a disposition.        REASSESSMENT     ED Course as of 05/24/24 1412   Fri May 24, 2024   1342 EKG rate 78bpm, sinus rhythm, no STEMI  [MG]      ED Course User Index  [MG] PaulMissy [email or web address] live     Update:  Patient remains in no acute distress, persistently hypertensive, CT, CTA without acute findings, incidental AVM noted and discussed with patient, no stenosis that suggests waxing and waning neurologic complaints.  Discussed with patient at bedside likely cervical radiculopathy, will provide anti-inflammatories and muscle relaxants, encourage follow-up with neurointerventional surgery for evaluation of AVM, orthopedics as needed for cervical radiculopathy and primary care for hypertension management, return precautions given.  Patient in agreement with plan.        CONSULTS:  None    PROCEDURES:  Unless otherwise noted below, none     Procedures        FINAL IMPRESSION    No diagnosis found.      DISPOSITION/PLAN   DISPOSITION        PATIENT REFERRED TO:  No follow-up provider specified.    DISCHARGE MEDICATIONS:  New Prescriptions    No medications on file     Controlled Substances Monitoring:          No data to display                (Please note that portions of this note were completed with a voice recognition program.  Efforts were made to edit the dictations but occasionally words are mis-transcribed.)    Maikol Almazan MD (electronically signed)  Attending Emergency Physician           Maikol Almazan MD  05/24/24 9761

## 2024-05-24 NOTE — ED TRIAGE NOTES
Patient arrives to the ED via POV with complaints of right sided neck pain that radiates to head that started 3 weeks ago.    Patient now reports R sided facial tingling that started 3 days ago.     Denies weakness.

## 2024-05-24 NOTE — PROGRESS NOTES
Triage Note to [facility]    CHIEF COMPLAINT    Chief Complaint   Patient presents with    Neck Pain     Shoulder pain that has radiated to neck and back of skull. R side of face gets tender and tingly. Difficulty swallowing on and off. 3 weeks. Pt also having palpitations and chest pain starting 2 days ago.          MEDICAL DECISION MAKING    Patient presented with   Chief Complaint   Patient presents with    Neck Pain     Shoulder pain that has radiated to neck and back of skull. R side of face gets tender and tingly. Difficulty swallowing on and off. 3 weeks. Pt also having palpitations and chest pain starting 2 days ago.     .  Discussed potential concerns for: hypertensive emergency, cardiac event of unknown etiology.   Recommend patient to seek care at the nearest emergency department for further evaluation due to the complexity of complaints/symptoms.     Transportation:  private car    TEST / PROCEDURES COMPLETED AT URGENT CARE:  1.No results found for this visit on 05/24/24.        ASSESSMENT / PLAN:    Send to ED for further evaluation.      HPI    Patricia Hicks is a 38 y.o. female who presents with c/o dysphagia, neck pain, right side of face tender and tingling, chest pain, and palpitations that started two days ago.         CURRENT MEDICATIONS    Current Outpatient Rx   Medication Sig Dispense Refill    naproxen (NAPROSYN) 500 MG tablet Take 1 tablet by mouth 2 times daily (with meals) 60 tablet 0    albuterol (PROVENTIL) (5 MG/ML) 0.5% nebulizer solution Take 0.5 mLs by nebulization every 6 hours as needed for Wheezing 120 each 0    ondansetron (ZOFRAN-ODT) 4 MG disintegrating tablet Take 1 tablet by mouth every 8 hours as needed for Nausea or Vomiting 30 tablet 0    fluticasone (FLONASE) 50 MCG/ACT nasal spray 2 sprays by Each Nostril route daily 32 g 1    albuterol sulfate HFA (PROVENTIL;VENTOLIN;PROAIR) 108 (90 Base) MCG/ACT inhaler Inhale 2 puffs into the lungs every 4 hours as

## 2024-05-26 LAB
EKG ATRIAL RATE: 78 BPM
EKG DIAGNOSIS: NORMAL
EKG P AXIS: 51 DEGREES
EKG P-R INTERVAL: 188 MS
EKG Q-T INTERVAL: 396 MS
EKG QRS DURATION: 82 MS
EKG QTC CALCULATION (BAZETT): 451 MS
EKG R AXIS: 14 DEGREES
EKG T AXIS: 26 DEGREES
EKG VENTRICULAR RATE: 78 BPM

## 2024-06-01 ENCOUNTER — APPOINTMENT (OUTPATIENT)
Facility: HOSPITAL | Age: 39
End: 2024-06-01
Payer: COMMERCIAL

## 2024-06-01 ENCOUNTER — HOSPITAL ENCOUNTER (EMERGENCY)
Facility: HOSPITAL | Age: 39
Discharge: HOME | End: 2024-06-01
Attending: STUDENT IN AN ORGANIZED HEALTH CARE EDUCATION/TRAINING PROGRAM
Payer: COMMERCIAL

## 2024-06-01 VITALS
RESPIRATION RATE: 18 BRPM | OXYGEN SATURATION: 97 % | SYSTOLIC BLOOD PRESSURE: 143 MMHG | TEMPERATURE: 98.4 F | WEIGHT: 264 LBS | HEIGHT: 65 IN | DIASTOLIC BLOOD PRESSURE: 77 MMHG | BODY MASS INDEX: 43.99 KG/M2 | HEART RATE: 74 BPM

## 2024-06-01 DIAGNOSIS — R00.2 PALPITATIONS: Primary | ICD-10-CM

## 2024-06-01 LAB
ALBUMIN SERPL-MCNC: 3.4 G/DL (ref 3.5–5)
ALBUMIN/GLOB SERPL: 0.8 (ref 1.1–2.2)
ALP SERPL-CCNC: 116 U/L (ref 45–117)
ALT SERPL-CCNC: 55 U/L (ref 12–78)
ANION GAP SERPL CALC-SCNC: 7 MMOL/L (ref 5–15)
AST SERPL-CCNC: 38 U/L (ref 15–37)
BASOPHILS # BLD: 0.1 K/UL (ref 0–0.1)
BASOPHILS NFR BLD: 1 % (ref 0–1)
BILIRUB SERPL-MCNC: 0.2 MG/DL (ref 0.2–1)
BUN SERPL-MCNC: 24 MG/DL (ref 6–20)
BUN/CREAT SERPL: 12 (ref 12–20)
CALCIUM SERPL-MCNC: 9.1 MG/DL (ref 8.5–10.1)
CHLORIDE SERPL-SCNC: 104 MMOL/L (ref 97–108)
CO2 SERPL-SCNC: 23 MMOL/L (ref 21–32)
COMMENT:: NORMAL
CREAT SERPL-MCNC: 2.04 MG/DL (ref 0.55–1.02)
DIFFERENTIAL METHOD BLD: ABNORMAL
EKG ATRIAL RATE: 90 BPM
EKG DIAGNOSIS: NORMAL
EKG P AXIS: 41 DEGREES
EKG P-R INTERVAL: 208 MS
EKG Q-T INTERVAL: 386 MS
EKG QRS DURATION: 88 MS
EKG QTC CALCULATION (BAZETT): 472 MS
EKG R AXIS: -4 DEGREES
EKG T AXIS: 9 DEGREES
EKG VENTRICULAR RATE: 90 BPM
EOSINOPHIL # BLD: 0.2 K/UL (ref 0–0.4)
EOSINOPHIL NFR BLD: 2 % (ref 0–7)
ERYTHROCYTE [DISTWIDTH] IN BLOOD BY AUTOMATED COUNT: 17.1 % (ref 11.5–14.5)
GLOBULIN SER CALC-MCNC: 4.1 G/DL (ref 2–4)
GLUCOSE SERPL-MCNC: 393 MG/DL (ref 65–100)
HCT VFR BLD AUTO: 36.1 % (ref 35–47)
HGB BLD-MCNC: 11.2 G/DL (ref 11.5–16)
IMM GRANULOCYTES # BLD AUTO: 0.1 K/UL (ref 0–0.04)
IMM GRANULOCYTES NFR BLD AUTO: 1 % (ref 0–0.5)
LYMPHOCYTES # BLD: 3.7 K/UL (ref 0.8–3.5)
LYMPHOCYTES NFR BLD: 41 % (ref 12–49)
MAGNESIUM SERPL-MCNC: 1.4 MG/DL (ref 1.6–2.4)
MCH RBC QN AUTO: 24.1 PG (ref 26–34)
MCHC RBC AUTO-ENTMCNC: 31 G/DL (ref 30–36.5)
MCV RBC AUTO: 77.8 FL (ref 80–99)
MONOCYTES # BLD: 0.4 K/UL (ref 0–1)
MONOCYTES NFR BLD: 5 % (ref 5–13)
NEUTS SEG # BLD: 4.6 K/UL (ref 1.8–8)
NEUTS SEG NFR BLD: 50 % (ref 32–75)
NRBC # BLD: 0 K/UL (ref 0–0.01)
NRBC BLD-RTO: 0 PER 100 WBC
PLATELET # BLD AUTO: 298 K/UL (ref 150–400)
PMV BLD AUTO: 10.7 FL (ref 8.9–12.9)
POTASSIUM SERPL-SCNC: 3.7 MMOL/L (ref 3.5–5.1)
PROT SERPL-MCNC: 7.5 G/DL (ref 6.4–8.2)
RBC # BLD AUTO: 4.64 M/UL (ref 3.8–5.2)
SODIUM SERPL-SCNC: 134 MMOL/L (ref 136–145)
SPECIMEN HOLD: NORMAL
TROPONIN I SERPL HS-MCNC: 27 NG/L (ref 0–51)
TROPONIN I SERPL HS-MCNC: 8 NG/L (ref 0–51)
WBC # BLD AUTO: 9.2 K/UL (ref 3.6–11)

## 2024-06-01 PROCEDURE — 84484 ASSAY OF TROPONIN QUANT: CPT

## 2024-06-01 PROCEDURE — 83735 ASSAY OF MAGNESIUM: CPT

## 2024-06-01 PROCEDURE — 36415 COLL VENOUS BLD VENIPUNCTURE: CPT

## 2024-06-01 PROCEDURE — 96366 THER/PROPH/DIAG IV INF ADDON: CPT

## 2024-06-01 PROCEDURE — 6360000002 HC RX W HCPCS: Performed by: STUDENT IN AN ORGANIZED HEALTH CARE EDUCATION/TRAINING PROGRAM

## 2024-06-01 PROCEDURE — 99285 EMERGENCY DEPT VISIT HI MDM: CPT

## 2024-06-01 PROCEDURE — 71045 X-RAY EXAM CHEST 1 VIEW: CPT

## 2024-06-01 PROCEDURE — 85025 COMPLETE CBC W/AUTO DIFF WBC: CPT

## 2024-06-01 PROCEDURE — 96365 THER/PROPH/DIAG IV INF INIT: CPT

## 2024-06-01 PROCEDURE — 80053 COMPREHEN METABOLIC PANEL: CPT

## 2024-06-01 PROCEDURE — 93005 ELECTROCARDIOGRAM TRACING: CPT | Performed by: STUDENT IN AN ORGANIZED HEALTH CARE EDUCATION/TRAINING PROGRAM

## 2024-06-01 RX ORDER — MAGNESIUM SULFATE IN WATER 40 MG/ML
2000 INJECTION, SOLUTION INTRAVENOUS
Status: COMPLETED | OUTPATIENT
Start: 2024-06-01 | End: 2024-06-01

## 2024-06-01 RX ADMIN — MAGNESIUM SULFATE HEPTAHYDRATE 2000 MG: 40 INJECTION, SOLUTION INTRAVENOUS at 02:34

## 2024-06-01 ASSESSMENT — PAIN DESCRIPTION - PAIN TYPE: TYPE: ACUTE PAIN

## 2024-06-01 ASSESSMENT — PAIN DESCRIPTION - ORIENTATION: ORIENTATION: LEFT

## 2024-06-01 ASSESSMENT — PAIN - FUNCTIONAL ASSESSMENT
PAIN_FUNCTIONAL_ASSESSMENT: ACTIVITIES ARE NOT PREVENTED
PAIN_FUNCTIONAL_ASSESSMENT: 0-10

## 2024-06-01 ASSESSMENT — PAIN SCALES - GENERAL: PAINLEVEL_OUTOF10: 2

## 2024-06-01 ASSESSMENT — PAIN DESCRIPTION - DESCRIPTORS: DESCRIPTORS: BURNING

## 2024-06-01 ASSESSMENT — PAIN DESCRIPTION - LOCATION: LOCATION: CHEST

## 2024-06-01 NOTE — ED TRIAGE NOTES
Patient arrives in the ER ambulatory with a complain of chest tightness.    Patient report, she feels a burning sensation on her left chest that was started 45 minutes ago, and it get worse when walking.    Denies any dizziness, nausea, vomiting and fever    Pmx: HTN, SVT

## 2024-06-01 NOTE — ED PROVIDER NOTES
[facility] EMERGENCY DEPT  EMERGENCY DEPARTMENT ENCOUNTER      Pt Name: Patricia Hicks  MRN: [id number]  Birthdate 1985  Date of evaluation: 6/1/2024  Provider: Tawana Banda MD    CHIEF COMPLAINT       Chief Complaint   Patient presents with    Chest Pain         HISTORY OF PRESENT ILLNESS    Review of Medical Records:     Nursing Triage Notes were reviewed.    HPI    Patricia Hicks is a 38 y.o. female with a history of hypertension, diabetes, SVT who presents to the emergency department for evaluation of palpitations.  Patient reports that prior to arrival she had a sensation of palpitations approximately 45 minutes ago.  States that this sensation was constant up until arrival here.  Complains of associated sensation of tightness in her chest.  Reports that she tried the vagal maneuver she was taught with previous episodes prior to arrival.  She felt that they had not helped prompting visit here.  Denies any associated lightheadedness, dizziness, syncope, nausea, vomiting, diaphoresis, fever.  Denies any recent illnesses.  Has been tolerating p.o. intake like normal.          PAST MEDICAL HISTORY     Past Medical History:   Diagnosis Date    VENICE (acute kidney injury) (HCC)     Asthma     Chest pain 10/18/2023    COVID     Diabetes (HCC)     gestational diabetes    Essential hypertension     seen today for elevated blood pressure    Leukocytosis     Staph aureus infection 2006    over abdomen         SURGICAL HISTORY       Past Surgical History:   Procedure Laterality Date    BREAST REDUCTION SURGERY      BREAST SURGERY      CHOLECYSTECTOMY      TONSILLECTOMY           CURRENT MEDICATIONS       Discharge Medication List as of 6/1/2024  3:57 AM        CONTINUE these medications which have NOT CHANGED    Details   !! naproxen (NAPROSYN) 500 MG tablet Take 1 tablet by mouth 2 times daily (with meals) for 14 days, Disp-28 tablet, R-0Normal      methocarbamol (ROBAXIN) 500 MG tablet Take 1.5 tablets by  mouth as needed (muscle spasm), Disp-45 tablet, R-0Normal      !! naproxen (NAPROSYN) 500 MG tablet Take 1 tablet by mouth 2 times daily (with meals), Disp-60 tablet, R-0Normal      albuterol (PROVENTIL) (5 MG/ML) 0.5% nebulizer solution Take 0.5 mLs by nebulization every 6 hours as needed for Wheezing, Disp-120 each, R-0Normal      ondansetron (ZOFRAN-ODT) 4 MG disintegrating tablet Take 1 tablet by mouth every 8 hours as needed for Nausea or Vomiting, Disp-30 tablet, R-0Normal      fluticasone (FLONASE) 50 MCG/ACT nasal spray 2 sprays by Each Nostril route daily, Disp-32 g, R-1Normal      albuterol sulfate HFA (PROVENTIL;VENTOLIN;PROAIR) 108 (90 Base) MCG/ACT inhaler Inhale 2 puffs into the lungs every 4 hours as needed for Wheezing, Disp-18 g, R-0Normal      glimepiride (AMARYL) 2 MG tablet Take 1 tablet by mouth 2 times daily, Disp-180 tablet, R-3Normal      valsartan (DIOVAN) 160 MG tablet Take 1 tablet by mouth daily, Disp-90 tablet, R-3Normal      metoprolol succinate (TOPROL XL) 25 MG extended release tablet Take 1 tablet by mouth daily, Disp-90 tablet, R-3Normal      rosuvastatin (CRESTOR) 5 MG tablet Take 1 tablet by mouth daily, Disp-90 tablet, R-3Normal       !! - Potential duplicate medications found. Please discuss with provider.          ALLERGIES     Cephalexin, Chlorzoxazone, Clarithromycin, Penicillins, and Codeine    FAMILY HISTORY     No family history on file.       SOCIAL HISTORY       Social History     Socioeconomic History    Marital status:    Tobacco Use    Smoking status: Never    Smokeless tobacco: Never   Substance and Sexual Activity    Alcohol use: No    Drug use: No           PHYSICAL EXAM       ED Triage Vitals   BP Temp Temp Source Pulse Respirations SpO2 Height Weight - Scale   06/01/24 0045 06/01/24 0045 06/01/24 0045 06/01/24 0044 06/01/24 0045 06/01/24 0045 06/01/24 0045 06/01/24 0045   (!) 187/89 98.4 °F (36.9 °C) Oral 97 15 99 % 1.651 m (5' 5\") 119.7 kg (264 lb)

## 2024-06-01 NOTE — DISCHARGE INSTRUCTIONS
You were evaluated in the Emergency Department today for palpitations and chest discomfort. Your evaluation has shown no signs of medical conditions requiring emergent intervention at this time, however we recommend that you follow up with your primary care physician or your cardiologist as soon as possible for further testing as an outpatient.    Please schedule an appointment for follow up with your primary care physician as soon as possible.    Return to the Emergency Department if you experience worsening or uncontrolled chest pain, shortness of breath, light headedness, feeling faint, nausea, vomiting, or any other concerning symptoms.    Thank you for choosing us for your care.

## 2024-06-03 LAB
EKG ATRIAL RATE: 90 BPM
EKG DIAGNOSIS: NORMAL
EKG P AXIS: 41 DEGREES
EKG P-R INTERVAL: 208 MS
EKG Q-T INTERVAL: 386 MS
EKG QRS DURATION: 88 MS
EKG QTC CALCULATION (BAZETT): 472 MS
EKG R AXIS: -4 DEGREES
EKG T AXIS: 9 DEGREES
EKG VENTRICULAR RATE: 90 BPM

## 2024-09-06 PROCEDURE — 99284 EMERGENCY DEPT VISIT MOD MDM: CPT

## 2024-09-07 ENCOUNTER — HOSPITAL ENCOUNTER (EMERGENCY)
Facility: HOSPITAL | Age: 39
Discharge: HOME | End: 2024-09-07
Attending: STUDENT IN AN ORGANIZED HEALTH CARE EDUCATION/TRAINING PROGRAM
Payer: COMMERCIAL

## 2024-09-07 VITALS
DIASTOLIC BLOOD PRESSURE: 85 MMHG | WEIGHT: 265.21 LBS | TEMPERATURE: 98.4 F | RESPIRATION RATE: 14 BRPM | OXYGEN SATURATION: 97 % | BODY MASS INDEX: 44.19 KG/M2 | HEART RATE: 74 BPM | SYSTOLIC BLOOD PRESSURE: 212 MMHG | HEIGHT: 65 IN

## 2024-09-07 DIAGNOSIS — I10 ASYMPTOMATIC HYPERTENSION: ICD-10-CM

## 2024-09-07 DIAGNOSIS — E11.65 HYPERGLYCEMIA DUE TO DIABETES MELLITUS (HCC): Primary | ICD-10-CM

## 2024-09-07 LAB
ALBUMIN SERPL-MCNC: 3.1 G/DL (ref 3.5–5)
ALBUMIN/GLOB SERPL: 0.7 (ref 1.1–2.2)
ALP SERPL-CCNC: 107 U/L (ref 45–117)
ALT SERPL-CCNC: 38 U/L (ref 12–78)
ANION GAP SERPL CALC-SCNC: 6 MMOL/L (ref 2–12)
APPEARANCE UR: CLEAR
AST SERPL-CCNC: 18 U/L (ref 15–37)
BACTERIA URNS QL MICRO: NEGATIVE /HPF
BASOPHILS # BLD: 0 K/UL (ref 0–0.1)
BASOPHILS NFR BLD: 0 % (ref 0–1)
BILIRUB SERPL-MCNC: 0.2 MG/DL (ref 0.2–1)
BILIRUB UR QL: NEGATIVE
BUN SERPL-MCNC: 38 MG/DL (ref 6–20)
BUN/CREAT SERPL: 18 (ref 12–20)
CALCIUM SERPL-MCNC: 8.5 MG/DL (ref 8.5–10.1)
CHLORIDE SERPL-SCNC: 99 MMOL/L (ref 97–108)
CO2 SERPL-SCNC: 24 MMOL/L (ref 21–32)
COLOR UR: ABNORMAL
COMMENT:: NORMAL
CREAT SERPL-MCNC: 2.17 MG/DL (ref 0.55–1.02)
DIFFERENTIAL METHOD BLD: ABNORMAL
EOSINOPHIL # BLD: 0 K/UL (ref 0–0.4)
EOSINOPHIL NFR BLD: 0 % (ref 0–7)
EPITH CASTS URNS QL MICRO: ABNORMAL /LPF
ERYTHROCYTE [DISTWIDTH] IN BLOOD BY AUTOMATED COUNT: 13.8 % (ref 11.5–14.5)
GLOBULIN SER CALC-MCNC: 4.2 G/DL (ref 2–4)
GLUCOSE BLD STRIP.AUTO-MCNC: 459 MG/DL (ref 65–117)
GLUCOSE BLD STRIP.AUTO-MCNC: 511 MG/DL (ref 65–117)
GLUCOSE BLD STRIP.AUTO-MCNC: 526 MG/DL (ref 65–117)
GLUCOSE BLD STRIP.AUTO-MCNC: 546 MG/DL (ref 65–117)
GLUCOSE SERPL-MCNC: 603 MG/DL (ref 65–100)
GLUCOSE UR STRIP.AUTO-MCNC: >1000 MG/DL
HCG UR QL: NEGATIVE
HCT VFR BLD AUTO: 35 % (ref 35–47)
HGB BLD-MCNC: 11.3 G/DL (ref 11.5–16)
HGB UR QL STRIP: ABNORMAL
HYALINE CASTS URNS QL MICRO: ABNORMAL /LPF (ref 0–5)
IMM GRANULOCYTES # BLD AUTO: 0 K/UL
IMM GRANULOCYTES NFR BLD AUTO: 0 %
KETONES UR QL STRIP.AUTO: NEGATIVE MG/DL
LEUKOCYTE ESTERASE UR QL STRIP.AUTO: NEGATIVE
LYMPHOCYTES # BLD: 3.4 K/UL (ref 0.8–3.5)
LYMPHOCYTES NFR BLD: 28 % (ref 12–49)
MAGNESIUM SERPL-MCNC: 1.4 MG/DL (ref 1.6–2.4)
MCH RBC QN AUTO: 26.5 PG (ref 26–34)
MCHC RBC AUTO-ENTMCNC: 32.3 G/DL (ref 30–36.5)
MCV RBC AUTO: 82 FL (ref 80–99)
METAMYELOCYTES NFR BLD MANUAL: 1 %
MONOCYTES # BLD: 0 K/UL (ref 0–1)
MONOCYTES NFR BLD: 0 % (ref 5–13)
NEUTS SEG # BLD: 8.7 K/UL (ref 1.8–8)
NEUTS SEG NFR BLD: 71 % (ref 32–75)
NITRITE UR QL STRIP.AUTO: NEGATIVE
NRBC # BLD: 0 K/UL (ref 0–0.01)
NRBC BLD-RTO: 0 PER 100 WBC
PH UR STRIP: 6 (ref 5–8)
PLATELET # BLD AUTO: 289 K/UL (ref 150–400)
PMV BLD AUTO: 11.2 FL (ref 8.9–12.9)
POTASSIUM SERPL-SCNC: 4.4 MMOL/L (ref 3.5–5.1)
PROT SERPL-MCNC: 7.3 G/DL (ref 6.4–8.2)
PROT UR STRIP-MCNC: 100 MG/DL
RBC # BLD AUTO: 4.27 M/UL (ref 3.8–5.2)
RBC #/AREA URNS HPF: ABNORMAL /HPF (ref 0–5)
RBC MORPH BLD: ABNORMAL
SERVICE CMNT-IMP: ABNORMAL
SODIUM SERPL-SCNC: 129 MMOL/L (ref 136–145)
SP GR UR REFRACTOMETRY: 1.02 (ref 1–1.03)
SPECIMEN HOLD: NORMAL
URINE CULTURE IF INDICATED: ABNORMAL
UROBILINOGEN UR QL STRIP.AUTO: 0.2 EU/DL (ref 0.2–1)
WBC # BLD AUTO: 12.3 K/UL (ref 3.6–11)
WBC URNS QL MICRO: ABNORMAL /HPF (ref 0–4)

## 2024-09-07 PROCEDURE — 80053 COMPREHEN METABOLIC PANEL: CPT

## 2024-09-07 PROCEDURE — 83735 ASSAY OF MAGNESIUM: CPT

## 2024-09-07 PROCEDURE — 85025 COMPLETE CBC W/AUTO DIFF WBC: CPT

## 2024-09-07 PROCEDURE — 36415 COLL VENOUS BLD VENIPUNCTURE: CPT

## 2024-09-07 PROCEDURE — 96360 HYDRATION IV INFUSION INIT: CPT

## 2024-09-07 PROCEDURE — 96361 HYDRATE IV INFUSION ADD-ON: CPT

## 2024-09-07 PROCEDURE — 6370000000 HC RX 637 (ALT 250 FOR IP): Performed by: STUDENT IN AN ORGANIZED HEALTH CARE EDUCATION/TRAINING PROGRAM

## 2024-09-07 PROCEDURE — 82962 GLUCOSE BLOOD TEST: CPT

## 2024-09-07 PROCEDURE — 2580000003 HC RX 258: Performed by: STUDENT IN AN ORGANIZED HEALTH CARE EDUCATION/TRAINING PROGRAM

## 2024-09-07 PROCEDURE — 81001 URINALYSIS AUTO W/SCOPE: CPT

## 2024-09-07 PROCEDURE — 81025 URINE PREGNANCY TEST: CPT

## 2024-09-07 RX ORDER — INSULIN LISPRO 100 [IU]/ML
10 INJECTION, SOLUTION INTRAVENOUS; SUBCUTANEOUS ONCE
Status: COMPLETED | OUTPATIENT
Start: 2024-09-07 | End: 2024-09-07

## 2024-09-07 RX ORDER — AMLODIPINE BESYLATE 5 MG/1
5 TABLET ORAL ONCE
Status: COMPLETED | OUTPATIENT
Start: 2024-09-07 | End: 2024-09-07

## 2024-09-07 RX ORDER — SODIUM CHLORIDE, SODIUM LACTATE, POTASSIUM CHLORIDE, AND CALCIUM CHLORIDE .6; .31; .03; .02 G/100ML; G/100ML; G/100ML; G/100ML
1000 INJECTION, SOLUTION INTRAVENOUS ONCE
Status: COMPLETED | OUTPATIENT
Start: 2024-09-07 | End: 2024-09-07

## 2024-09-07 RX ORDER — AMLODIPINE BESYLATE 5 MG/1
5 TABLET ORAL DAILY
Qty: 30 TABLET | Refills: 0 | Status: SHIPPED | OUTPATIENT
Start: 2024-09-07

## 2024-09-07 RX ADMIN — INSULIN LISPRO 10 UNITS: 100 INJECTION, SOLUTION INTRAVENOUS; SUBCUTANEOUS at 04:58

## 2024-09-07 RX ADMIN — SODIUM CHLORIDE, POTASSIUM CHLORIDE, SODIUM LACTATE AND CALCIUM CHLORIDE 1000 ML: 600; 310; 30; 20 INJECTION, SOLUTION INTRAVENOUS at 00:27

## 2024-09-07 RX ADMIN — AMLODIPINE BESYLATE 5 MG: 5 TABLET ORAL at 04:57

## 2024-09-07 ASSESSMENT — PAIN - FUNCTIONAL ASSESSMENT: PAIN_FUNCTIONAL_ASSESSMENT: 0-10

## 2024-09-07 ASSESSMENT — PAIN SCALES - GENERAL: PAINLEVEL_OUTOF10: 0

## 2024-09-07 NOTE — ED TRIAGE NOTES
CC high blood sugar.  Pt reports blurry and took her sugar and it was high in the 300s and checked again and hour later and kept elevating.    Pt was on paxlovid and prednisone due to having covid recently.   Denies n/v.   Hx of asthma, kidney disease and hypertension   Pt bg 546 in triage

## 2024-09-07 NOTE — DISCHARGE INSTRUCTIONS
Please return to the emergency department if you experience chest pain, shortness of breath, sudden worsening of your vision or eye pain, severe headache that persists after tylenol or ibuprofen, neck stiffness, fainting, muscle weakness, numbness or tingling, or other new and concerning symptom.

## 2024-09-10 NOTE — ED PROVIDER NOTES
[facility] EMERGENCY DEPT  EMERGENCY DEPARTMENT ENCOUNTER      Pt Name: Patricia Hicks  MRN: [id number]  Birthdate 1985  Date of evaluation: 9/6/2024  Provider: Tawana Banda MD    CHIEF COMPLAINT       Chief Complaint   Patient presents with    Hyperglycemia    Dizziness         HISTORY OF PRESENT ILLNESS    Nursing Triage Notes were reviewed.    HPI    Patricia Hicks is a 38 y.o. female with a history of diabetes, asthma, kidney disease, hypertension who presents to the emergency department for evaluation of high blood sugar.  Patient reports that she checked her blood sugar and it was in the 300 then checked an hour later to rise and is now in the 500s.  She states that she was recently diagnosed with COVID-19 and was on Paxlovid and started on prednisone.  She reports that she still has 1 day left and her course of prednisone.  Patient complains of some blurred vision, urinary frequency.  Reports that it she has been taking her prescribed glimepiride and has never had blood sugars as high in the past.  Reports she is currently on valsartan and metoprolol for her blood pressure. Denies any acute complaints including headache, chest pain, difficulty breathing, changes in urination, dizziness, weakness, abnormal sensation, or vision changes.      PAST MEDICAL HISTORY     Past Medical History:   Diagnosis Date    VENICE (acute kidney injury) (HCC)     Asthma     Chest pain 10/18/2023    COVID     Diabetes (HCC)     gestational diabetes    Essential hypertension     seen today for elevated blood pressure    Leukocytosis     Staph aureus infection 2006    over abdomen         SURGICAL HISTORY       Past Surgical History:   Procedure Laterality Date    BREAST REDUCTION SURGERY      BREAST SURGERY      CHOLECYSTECTOMY      TONSILLECTOMY           CURRENT MEDICATIONS       Discharge Medication List as of 9/7/2024  5:08 AM        CONTINUE these medications which have NOT CHANGED    Details   naproxen

## 2025-03-05 ENCOUNTER — APPOINTMENT (OUTPATIENT)
Facility: HOSPITAL | Age: 40
End: 2025-03-05
Payer: COMMERCIAL

## 2025-03-05 ENCOUNTER — HOSPITAL ENCOUNTER (EMERGENCY)
Facility: HOSPITAL | Age: 40
Discharge: HOME | End: 2025-03-05
Attending: EMERGENCY MEDICINE
Payer: COMMERCIAL

## 2025-03-05 VITALS
RESPIRATION RATE: 18 BRPM | DIASTOLIC BLOOD PRESSURE: 96 MMHG | TEMPERATURE: 98.8 F | HEART RATE: 82 BPM | SYSTOLIC BLOOD PRESSURE: 167 MMHG | OXYGEN SATURATION: 100 %

## 2025-03-05 DIAGNOSIS — R79.89 ELEVATED SERUM CREATININE: ICD-10-CM

## 2025-03-05 DIAGNOSIS — R11.2 NAUSEA AND VOMITING, UNSPECIFIED VOMITING TYPE: ICD-10-CM

## 2025-03-05 DIAGNOSIS — I10 HYPERTENSION, UNSPECIFIED TYPE: Primary | ICD-10-CM

## 2025-03-05 DIAGNOSIS — G44.89 OTHER HEADACHE SYNDROME: ICD-10-CM

## 2025-03-05 LAB
ALBUMIN SERPL-MCNC: 3.2 G/DL (ref 3.5–5)
ALBUMIN/GLOB SERPL: 0.7 (ref 1.1–2.2)
ALP SERPL-CCNC: 127 U/L (ref 45–117)
ALT SERPL-CCNC: 27 U/L (ref 12–78)
ANION GAP SERPL CALC-SCNC: 8 MMOL/L (ref 2–12)
AST SERPL-CCNC: 21 U/L (ref 15–37)
BASOPHILS # BLD: 0.04 K/UL (ref 0–0.1)
BASOPHILS NFR BLD: 0.3 % (ref 0–1)
BILIRUB SERPL-MCNC: 0.2 MG/DL (ref 0.2–1)
BUN SERPL-MCNC: 17 MG/DL (ref 6–20)
BUN/CREAT SERPL: 8 (ref 12–20)
CALCIUM SERPL-MCNC: 8.6 MG/DL (ref 8.5–10.1)
CHLORIDE SERPL-SCNC: 107 MMOL/L (ref 97–108)
CO2 SERPL-SCNC: 21 MMOL/L (ref 21–32)
COMMENT:: NORMAL
COMMENT:: NORMAL
CREAT SERPL-MCNC: 2.1 MG/DL (ref 0.55–1.02)
DIFFERENTIAL METHOD BLD: ABNORMAL
EOSINOPHIL # BLD: 0.23 K/UL (ref 0–0.4)
EOSINOPHIL NFR BLD: 2 % (ref 0–7)
ERYTHROCYTE [DISTWIDTH] IN BLOOD BY AUTOMATED COUNT: 14 % (ref 11.5–14.5)
GLOBULIN SER CALC-MCNC: 4.4 G/DL (ref 2–4)
GLUCOSE SERPL-MCNC: 116 MG/DL (ref 65–100)
HCG UR QL: NEGATIVE
HCT VFR BLD AUTO: 27.7 % (ref 35–47)
HGB BLD-MCNC: 8.6 G/DL (ref 11.5–16)
IMM GRANULOCYTES # BLD AUTO: 0.16 K/UL (ref 0–0.04)
IMM GRANULOCYTES NFR BLD AUTO: 1.4 % (ref 0–0.5)
LYMPHOCYTES # BLD: 3.6 K/UL (ref 0.8–3.5)
LYMPHOCYTES NFR BLD: 30.6 % (ref 12–49)
MCH RBC QN AUTO: 24.9 PG (ref 26–34)
MCHC RBC AUTO-ENTMCNC: 31 G/DL (ref 30–36.5)
MCV RBC AUTO: 80.3 FL (ref 80–99)
MONOCYTES # BLD: 0.54 K/UL (ref 0–1)
MONOCYTES NFR BLD: 4.6 % (ref 5–13)
NEUTS SEG # BLD: 7.21 K/UL (ref 1.8–8)
NEUTS SEG NFR BLD: 61.1 % (ref 32–75)
NRBC # BLD: 0 K/UL (ref 0–0.01)
NRBC BLD-RTO: 0 PER 100 WBC
PLATELET # BLD AUTO: 337 K/UL (ref 150–400)
PMV BLD AUTO: 10.7 FL (ref 8.9–12.9)
POTASSIUM SERPL-SCNC: 3.9 MMOL/L (ref 3.5–5.1)
PROT SERPL-MCNC: 7.6 G/DL (ref 6.4–8.2)
RBC # BLD AUTO: 3.45 M/UL (ref 3.8–5.2)
SODIUM SERPL-SCNC: 136 MMOL/L (ref 136–145)
SPECIMEN HOLD: NORMAL
SPECIMEN HOLD: NORMAL
TROPONIN I SERPL HS-MCNC: <4 NG/L (ref 0–51)
WBC # BLD AUTO: 11.8 K/UL (ref 3.6–11)

## 2025-03-05 PROCEDURE — 80053 COMPREHEN METABOLIC PANEL: CPT

## 2025-03-05 PROCEDURE — 36415 COLL VENOUS BLD VENIPUNCTURE: CPT

## 2025-03-05 PROCEDURE — 6370000000 HC RX 637 (ALT 250 FOR IP): Performed by: STUDENT IN AN ORGANIZED HEALTH CARE EDUCATION/TRAINING PROGRAM

## 2025-03-05 PROCEDURE — 84484 ASSAY OF TROPONIN QUANT: CPT

## 2025-03-05 PROCEDURE — 85025 COMPLETE CBC W/AUTO DIFF WBC: CPT

## 2025-03-05 PROCEDURE — 70450 CT HEAD/BRAIN W/O DYE: CPT

## 2025-03-05 PROCEDURE — 81025 URINE PREGNANCY TEST: CPT

## 2025-03-05 PROCEDURE — 93005 ELECTROCARDIOGRAM TRACING: CPT | Performed by: STUDENT IN AN ORGANIZED HEALTH CARE EDUCATION/TRAINING PROGRAM

## 2025-03-05 PROCEDURE — 96374 THER/PROPH/DIAG INJ IV PUSH: CPT

## 2025-03-05 PROCEDURE — 6360000002 HC RX W HCPCS: Performed by: STUDENT IN AN ORGANIZED HEALTH CARE EDUCATION/TRAINING PROGRAM

## 2025-03-05 PROCEDURE — 99285 EMERGENCY DEPT VISIT HI MDM: CPT

## 2025-03-05 RX ORDER — CLONIDINE HYDROCHLORIDE 0.1 MG/1
0.1 TABLET ORAL
Status: COMPLETED | OUTPATIENT
Start: 2025-03-05 | End: 2025-03-05

## 2025-03-05 RX ORDER — METOCLOPRAMIDE HYDROCHLORIDE 5 MG/ML
10 INJECTION INTRAMUSCULAR; INTRAVENOUS EVERY 6 HOURS
Status: DISCONTINUED | OUTPATIENT
Start: 2025-03-05 | End: 2025-03-05 | Stop reason: HOSPADM

## 2025-03-05 RX ADMIN — METOCLOPRAMIDE 10 MG: 5 INJECTION, SOLUTION INTRAMUSCULAR; INTRAVENOUS at 20:29

## 2025-03-05 RX ADMIN — CLONIDINE HYDROCHLORIDE 0.1 MG: 0.1 TABLET ORAL at 20:29

## 2025-03-05 ASSESSMENT — PAIN DESCRIPTION - LOCATION: LOCATION: HEAD

## 2025-03-05 ASSESSMENT — LIFESTYLE VARIABLES
HOW MANY STANDARD DRINKS CONTAINING ALCOHOL DO YOU HAVE ON A TYPICAL DAY: PATIENT DOES NOT DRINK
HOW OFTEN DO YOU HAVE A DRINK CONTAINING ALCOHOL: NEVER

## 2025-03-05 ASSESSMENT — PAIN SCALES - GENERAL: PAINLEVEL_OUTOF10: 5

## 2025-03-05 ASSESSMENT — PAIN - FUNCTIONAL ASSESSMENT: PAIN_FUNCTIONAL_ASSESSMENT: 0-10

## 2025-03-05 ASSESSMENT — PAIN DESCRIPTION - DESCRIPTORS: DESCRIPTORS: THROBBING

## 2025-03-06 NOTE — ED PROVIDER NOTES
Mouth: Mucous membranes are moist.   Eyes:      Extraocular Movements: Extraocular movements intact.      Conjunctiva/sclera: Conjunctivae normal.   Cardiovascular:      Rate and Rhythm: Normal rate and regular rhythm.      Heart sounds: Normal heart sounds.   Pulmonary:      Effort: Pulmonary effort is normal. No respiratory distress.      Breath sounds: Normal breath sounds.   Abdominal:      General: Bowel sounds are normal.      Palpations: Abdomen is soft.      Tenderness: There is no abdominal tenderness. There is no guarding or rebound.   Musculoskeletal:         General: Normal range of motion.      Cervical back: Normal range of motion.   Skin:     General: Skin is warm.   Neurological:      General: No focal deficit present.      Mental Status: She is alert and oriented to person, place, and time. Mental status is at baseline.      Comments: Cranial Nerves: Intact visual fields. Fundi are benign. PERRL, EOM's full and intact, no nystagmus, no ptosis. Facial sensation is normal. Facial movement is symmetric. Palate is midline with normal sternocleidomastoid and trapezius muscle strength. Tongue is midline.      Motor:  5/5 strength in upper and lower proximal and distal muscles. Normal bulk and tone.       Reflexes:  Biceps and quadriceps tendon reflexes 2+ and symmetrical.      Sensory:  Normal to light touch     Gait:   Normal gait.      Cerebellar:  Negative Romberg.       Psychiatric:         Mood and Affect: Mood normal.         Behavior: Behavior normal.         Thought Content: Thought content normal.         DIAGNOSTIC RESULTS     EKG: All EKG's are interpreted by the Emergency Department Physician who either signs or Co-signs this chart in the absence of a cardiologist.        RADIOLOGY:   Non-plain film images such as CT, Ultrasound and MRI are read by the radiologist. Plain radiographic images are visualized and preliminarily interpreted by the emergency physician with the below

## 2025-03-06 NOTE — DISCHARGE INSTRUCTIONS
Continue taking your blood pressure medications as prescribed.  Recommend follow-up with your primary care physician as well as [facility] given ongoing elevated creatinine.  If you develop new or worsening symptoms return to the ER.

## 2025-03-06 NOTE — ED TRIAGE NOTES
Pt arrives to ED via POV ambulatory c/o elevated BP. Pt reports hx HTN and states she has been compliant with her medications. Pt reports HA, dizziness, and n/v for the last 4 days. Provider in triage.

## 2025-03-07 LAB
EKG DIAGNOSIS: NORMAL
EKG Q-T INTERVAL: 398 MS
EKG QRS DURATION: 88 MS
EKG QTC CALCULATION (BAZETT): 453 MS
EKG R AXIS: 8 DEGREES
EKG T AXIS: 22 DEGREES
EKG VENTRICULAR RATE: 78 BPM

## 2025-04-29 DIAGNOSIS — I47.10 SVT (SUPRAVENTRICULAR TACHYCARDIA): ICD-10-CM

## 2025-04-29 DIAGNOSIS — E11.22 TYPE 2 DIABETES MELLITUS WITH CHRONIC KIDNEY DISEASE, WITHOUT LONG-TERM CURRENT USE OF INSULIN, UNSPECIFIED CKD STAGE (HCC): ICD-10-CM

## 2025-04-29 DIAGNOSIS — I10 ESSENTIAL HYPERTENSION: ICD-10-CM

## 2025-04-30 RX ORDER — VALSARTAN 160 MG/1
160 TABLET ORAL DAILY
Qty: 90 TABLET | Refills: 3 | OUTPATIENT
Start: 2025-04-30

## 2025-04-30 NOTE — TELEPHONE ENCOUNTER
Per verbal order from Dr. Amber Reddy  Last appt: 10/18/2023     No future appointments.    Requested Prescriptions     Refused Prescriptions Disp Refills    valsartan (DIOVAN) 160 MG tablet 90 tablet 3     Sig: Take 1 tablet by mouth daily     Refused By: SHRUTHI PUCKETT     Reason for Refusal: Patient needs an appointment

## 2025-05-14 ENCOUNTER — APPOINTMENT (OUTPATIENT)
Facility: HOSPITAL | Age: 40
End: 2025-05-14
Payer: COMMERCIAL

## 2025-05-14 ENCOUNTER — HOSPITAL ENCOUNTER (EMERGENCY)
Facility: HOSPITAL | Age: 40
Discharge: HOME | End: 2025-05-15
Attending: STUDENT IN AN ORGANIZED HEALTH CARE EDUCATION/TRAINING PROGRAM
Payer: COMMERCIAL

## 2025-05-14 DIAGNOSIS — N83.202 LEFT OVARIAN CYST: ICD-10-CM

## 2025-05-14 DIAGNOSIS — R51.9 FRONTAL HEADACHE: Primary | ICD-10-CM

## 2025-05-14 DIAGNOSIS — I10 ESSENTIAL HYPERTENSION: ICD-10-CM

## 2025-05-14 LAB
ALBUMIN SERPL-MCNC: 3.2 G/DL (ref 3.5–5)
ALBUMIN/GLOB SERPL: 0.7 (ref 1.1–2.2)
ALP SERPL-CCNC: 143 U/L (ref 45–117)
ALT SERPL-CCNC: 44 U/L (ref 12–78)
ANION GAP SERPL CALC-SCNC: 6 MMOL/L (ref 2–12)
AST SERPL-CCNC: 41 U/L (ref 15–37)
BASOPHILS # BLD: 0.04 K/UL (ref 0–0.1)
BASOPHILS NFR BLD: 0.4 % (ref 0–1)
BILIRUB SERPL-MCNC: 0.2 MG/DL (ref 0.2–1)
BUN SERPL-MCNC: 24 MG/DL (ref 6–20)
BUN/CREAT SERPL: 10 (ref 12–20)
CALCIUM SERPL-MCNC: 8.8 MG/DL (ref 8.5–10.1)
CHLORIDE SERPL-SCNC: 109 MMOL/L (ref 97–108)
CO2 SERPL-SCNC: 22 MMOL/L (ref 21–32)
CREAT SERPL-MCNC: 2.32 MG/DL (ref 0.55–1.02)
DIFFERENTIAL METHOD BLD: ABNORMAL
EOSINOPHIL # BLD: 0.21 K/UL (ref 0–0.4)
EOSINOPHIL NFR BLD: 2.3 % (ref 0–7)
ERYTHROCYTE [DISTWIDTH] IN BLOOD BY AUTOMATED COUNT: 16.6 % (ref 11.5–14.5)
GLOBULIN SER CALC-MCNC: 4.3 G/DL (ref 2–4)
GLUCOSE BLD STRIP.AUTO-MCNC: 169 MG/DL (ref 65–117)
GLUCOSE SERPL-MCNC: 163 MG/DL (ref 65–100)
HCT VFR BLD AUTO: 30.1 % (ref 35–47)
HGB BLD-MCNC: 9 G/DL (ref 11.5–16)
IMM GRANULOCYTES # BLD AUTO: 0.06 K/UL (ref 0–0.04)
IMM GRANULOCYTES NFR BLD AUTO: 0.7 % (ref 0–0.5)
LYMPHOCYTES # BLD: 2.98 K/UL (ref 0.8–3.5)
LYMPHOCYTES NFR BLD: 33 % (ref 12–49)
MCH RBC QN AUTO: 23.4 PG (ref 26–34)
MCHC RBC AUTO-ENTMCNC: 29.9 G/DL (ref 30–36.5)
MCV RBC AUTO: 78.4 FL (ref 80–99)
MONOCYTES # BLD: 0.46 K/UL (ref 0–1)
MONOCYTES NFR BLD: 5.1 % (ref 5–13)
NEUTS SEG # BLD: 5.28 K/UL (ref 1.8–8)
NEUTS SEG NFR BLD: 58.5 % (ref 32–75)
NRBC # BLD: 0 K/UL (ref 0–0.01)
NRBC BLD-RTO: 0 PER 100 WBC
PLATELET # BLD AUTO: 322 K/UL (ref 150–400)
PMV BLD AUTO: 10.2 FL (ref 8.9–12.9)
POTASSIUM SERPL-SCNC: 4.2 MMOL/L (ref 3.5–5.1)
PROT SERPL-MCNC: 7.5 G/DL (ref 6.4–8.2)
RBC # BLD AUTO: 3.84 M/UL (ref 3.8–5.2)
SERVICE CMNT-IMP: ABNORMAL
SODIUM SERPL-SCNC: 137 MMOL/L (ref 136–145)
TROPONIN I SERPL HS-MCNC: 7 NG/L (ref 0–51)
TROPONIN I SERPL HS-MCNC: 7 NG/L (ref 0–51)
WBC # BLD AUTO: 9 K/UL (ref 3.6–11)

## 2025-05-14 PROCEDURE — 2580000003 HC RX 258: Performed by: STUDENT IN AN ORGANIZED HEALTH CARE EDUCATION/TRAINING PROGRAM

## 2025-05-14 PROCEDURE — 99285 EMERGENCY DEPT VISIT HI MDM: CPT

## 2025-05-14 PROCEDURE — 85025 COMPLETE CBC W/AUTO DIFF WBC: CPT

## 2025-05-14 PROCEDURE — 93005 ELECTROCARDIOGRAM TRACING: CPT | Performed by: STUDENT IN AN ORGANIZED HEALTH CARE EDUCATION/TRAINING PROGRAM

## 2025-05-14 PROCEDURE — 74176 CT ABD & PELVIS W/O CONTRAST: CPT

## 2025-05-14 PROCEDURE — 70450 CT HEAD/BRAIN W/O DYE: CPT

## 2025-05-14 PROCEDURE — 76830 TRANSVAGINAL US NON-OB: CPT

## 2025-05-14 PROCEDURE — 84484 ASSAY OF TROPONIN QUANT: CPT

## 2025-05-14 PROCEDURE — 76856 US EXAM PELVIC COMPLETE: CPT

## 2025-05-14 PROCEDURE — 83690 ASSAY OF LIPASE: CPT

## 2025-05-14 PROCEDURE — 83735 ASSAY OF MAGNESIUM: CPT

## 2025-05-14 PROCEDURE — 71045 X-RAY EXAM CHEST 1 VIEW: CPT

## 2025-05-14 PROCEDURE — 82962 GLUCOSE BLOOD TEST: CPT

## 2025-05-14 PROCEDURE — 36415 COLL VENOUS BLD VENIPUNCTURE: CPT

## 2025-05-14 PROCEDURE — 96374 THER/PROPH/DIAG INJ IV PUSH: CPT

## 2025-05-14 PROCEDURE — 6360000002 HC RX W HCPCS: Performed by: STUDENT IN AN ORGANIZED HEALTH CARE EDUCATION/TRAINING PROGRAM

## 2025-05-14 PROCEDURE — 83880 ASSAY OF NATRIURETIC PEPTIDE: CPT

## 2025-05-14 PROCEDURE — 72125 CT NECK SPINE W/O DYE: CPT

## 2025-05-14 PROCEDURE — 80053 COMPREHEN METABOLIC PANEL: CPT

## 2025-05-14 RX ORDER — 0.9 % SODIUM CHLORIDE 0.9 %
1000 INTRAVENOUS SOLUTION INTRAVENOUS ONCE
Status: COMPLETED | OUTPATIENT
Start: 2025-05-14 | End: 2025-05-15

## 2025-05-14 RX ORDER — CIPROFLOXACIN 2 MG/ML
400 INJECTION, SOLUTION INTRAVENOUS ONCE
Status: DISCONTINUED | OUTPATIENT
Start: 2025-05-14 | End: 2025-05-14

## 2025-05-14 RX ORDER — METRONIDAZOLE 500 MG/100ML
500 INJECTION, SOLUTION INTRAVENOUS ONCE
Status: DISCONTINUED | OUTPATIENT
Start: 2025-05-14 | End: 2025-05-14

## 2025-05-14 RX ORDER — PROCHLORPERAZINE EDISYLATE 5 MG/ML
10 INJECTION INTRAMUSCULAR; INTRAVENOUS
Status: COMPLETED | OUTPATIENT
Start: 2025-05-14 | End: 2025-05-14

## 2025-05-14 RX ADMIN — PROCHLORPERAZINE EDISYLATE 10 MG: 5 INJECTION INTRAMUSCULAR; INTRAVENOUS at 20:15

## 2025-05-14 RX ADMIN — SODIUM CHLORIDE 1000 ML: 0.9 INJECTION, SOLUTION INTRAVENOUS at 20:14

## 2025-05-14 ASSESSMENT — PAIN - FUNCTIONAL ASSESSMENT: PAIN_FUNCTIONAL_ASSESSMENT: 0-10

## 2025-05-14 ASSESSMENT — PAIN SCALES - GENERAL: PAINLEVEL_OUTOF10: 0

## 2025-05-14 NOTE — ED TRIAGE NOTES
Patient arrives to the ED with complaints of hypertension.    States she takes Valsartan 160mg.    Pt endorses N/V, headache for the past four days and intermittent chest pain.    Hx of SVT.

## 2025-05-15 VITALS
DIASTOLIC BLOOD PRESSURE: 59 MMHG | HEART RATE: 69 BPM | TEMPERATURE: 98.2 F | HEIGHT: 65 IN | BODY MASS INDEX: 44.13 KG/M2 | SYSTOLIC BLOOD PRESSURE: 143 MMHG | RESPIRATION RATE: 18 BRPM | OXYGEN SATURATION: 100 %

## 2025-05-15 LAB
LIPASE SERPL-CCNC: 47 U/L (ref 13–75)
MAGNESIUM SERPL-MCNC: 1.6 MG/DL (ref 1.6–2.4)
NT PRO BNP: 94 PG/ML

## 2025-05-15 NOTE — ED NOTES
10:55 PM  Change of shift. Care of patient taken over from Dr Tripathi; H&P reviewed, bedside handoff complete.  Awaiting pelvic US and repeat troponin.     Repeat troponin negative. US shows no torsion. Stable for discharge.      Gautam Elizondo MD  05/15/25 1327

## 2025-05-15 NOTE — ED PROVIDER NOTES
[facility] EMERGENCY DEPARTMENT  EMERGENCY DEPARTMENT ENCOUNTER      Patient Name:  Patricia Hicks  MRN:   [id number]  :   1985  Date of Evaluation: 2025  Physician:  Sue Tripathi MD    Chief Complaint   Patient presents with    Hypertension       HISTORY OF PRESENT ILLNESS    This is a 39-year-old female with a history of chronic kidney disease, hypertension, diabetes, presenting with multiple complaints.  She reports a frontal headache that started several days ago, gradual in onset, associated with photophobia and vomiting today as well as left lower quadrant abdominal pain.  Denies loss of vision, lateralizing weakness or sensory deficits, or difficulty walking.  Was noted to be hypertensive above the 200's systolics today, she takes valsartan only.  She also reports chest pain prior to today that has since resolved, denies any active chest pain or shortness of breath.  No pain radiating to the back.  No other systemic complaints.      REVIEW OF SYSTEMS   A review of systems was performed and was negative except as documented in the HPI.     PAST MEDICAL HISTORY     Past Medical History:   Diagnosis Date    VENICE (acute kidney injury)     Asthma     Chest pain 10/18/2023    COVID     Diabetes (HCC)     gestational diabetes    Essential hypertension     seen today for elevated blood pressure    Leukocytosis     Staph aureus infection 2006    over abdomen       PAST SURGICAL HISTORY     Past Surgical History:   Procedure Laterality Date    BREAST REDUCTION SURGERY      BREAST SURGERY      CHOLECYSTECTOMY      TONSILLECTOMY         ALLERGIES   Cephalexin, Chlorzoxazone, Clarithromycin, Fruit blend, Penicillins, Beef-derived drug products, and Codeine    FAMILY HISTORY   No family history on file.    SOCIAL HISTORY     Social History     Socioeconomic History    Marital status:    Tobacco Use    Smoking status: Never    Smokeless tobacco: Never   Substance and Sexual Activity  Simponi Pregnancy And Lactation Text: The risk during pregnancy and breastfeeding is uncertain with this medication.

## 2025-05-17 LAB
EKG ATRIAL RATE: 75 BPM
EKG DIAGNOSIS: NORMAL
EKG P AXIS: 33 DEGREES
EKG P-R INTERVAL: 184 MS
EKG Q-T INTERVAL: 414 MS
EKG QRS DURATION: 84 MS
EKG QTC CALCULATION (BAZETT): 462 MS
EKG R AXIS: -7 DEGREES
EKG T AXIS: 7 DEGREES
EKG VENTRICULAR RATE: 75 BPM

## 2025-05-17 PROCEDURE — 93010 ELECTROCARDIOGRAM REPORT: CPT | Performed by: INTERNAL MEDICINE

## 2025-06-09 ENCOUNTER — HOSPITAL ENCOUNTER (EMERGENCY)
Facility: HOSPITAL | Age: 40
Discharge: HOME | End: 2025-06-09
Attending: EMERGENCY MEDICINE
Payer: COMMERCIAL

## 2025-06-09 ENCOUNTER — APPOINTMENT (OUTPATIENT)
Facility: HOSPITAL | Age: 40
End: 2025-06-09
Payer: COMMERCIAL

## 2025-06-09 VITALS
BODY MASS INDEX: 44.98 KG/M2 | HEIGHT: 65 IN | RESPIRATION RATE: 16 BRPM | OXYGEN SATURATION: 97 % | HEART RATE: 81 BPM | WEIGHT: 270 LBS | DIASTOLIC BLOOD PRESSURE: 83 MMHG | TEMPERATURE: 98.3 F | SYSTOLIC BLOOD PRESSURE: 190 MMHG

## 2025-06-09 DIAGNOSIS — S90.32XA CONTUSION OF LEFT FOOT, INITIAL ENCOUNTER: Primary | ICD-10-CM

## 2025-06-09 DIAGNOSIS — S90.02XA CONTUSION OF LEFT ANKLE, INITIAL ENCOUNTER: ICD-10-CM

## 2025-06-09 PROCEDURE — 73610 X-RAY EXAM OF ANKLE: CPT

## 2025-06-09 PROCEDURE — 99283 EMERGENCY DEPT VISIT LOW MDM: CPT

## 2025-06-09 PROCEDURE — 73630 X-RAY EXAM OF FOOT: CPT

## 2025-06-09 RX ORDER — IBUPROFEN 600 MG/1
600 TABLET, FILM COATED ORAL EVERY 6 HOURS PRN
Qty: 21 TABLET | Refills: 0 | Status: SHIPPED | OUTPATIENT
Start: 2025-06-09

## 2025-06-09 RX ORDER — ACETAMINOPHEN 500 MG
1000 TABLET ORAL EVERY 6 HOURS PRN
Qty: 40 TABLET | Refills: 0 | Status: SHIPPED | OUTPATIENT
Start: 2025-06-09

## 2025-06-09 RX ORDER — CAPSAICIN 0.025 %
CREAM (GRAM) TOPICAL 3 TIMES DAILY PRN
Qty: 60 G | Refills: 0 | Status: SHIPPED | OUTPATIENT
Start: 2025-06-09

## 2025-06-09 ASSESSMENT — PAIN SCALES - GENERAL
PAINLEVEL_OUTOF10: 6
PAINLEVEL_OUTOF10: 4

## 2025-06-09 ASSESSMENT — PAIN DESCRIPTION - DESCRIPTORS: DESCRIPTORS: ACHING

## 2025-06-09 ASSESSMENT — PAIN - FUNCTIONAL ASSESSMENT
PAIN_FUNCTIONAL_ASSESSMENT: NONE - DENIES PAIN
PAIN_FUNCTIONAL_ASSESSMENT: 0-10
PAIN_FUNCTIONAL_ASSESSMENT: ACTIVITIES ARE NOT PREVENTED

## 2025-06-09 ASSESSMENT — PAIN DESCRIPTION - LOCATION: LOCATION: ANKLE

## 2025-06-09 ASSESSMENT — PAIN DESCRIPTION - PAIN TYPE: TYPE: ACUTE PAIN

## 2025-06-09 ASSESSMENT — PAIN DESCRIPTION - ORIENTATION: ORIENTATION: LEFT

## 2025-06-09 ASSESSMENT — PAIN DESCRIPTION - FREQUENCY: FREQUENCY: CONTINUOUS

## 2025-06-09 ASSESSMENT — PAIN DESCRIPTION - ONSET: ONSET: SUDDEN

## 2025-06-10 NOTE — ED PROVIDER NOTES
[id number]  [phone]    Schedule an appointment as soon as possible for a visit   As needed, for re-evaluation      DISCHARGE MEDICATIONS:  Discharge Medication List as of 6/9/2025 11:23 PM        START taking these medications    Details   ibuprofen (ADVIL;MOTRIN) 600 MG tablet Take 1 tablet by mouth every 6 hours as needed for Pain or Fever, Disp-21 tablet, R-0Normal      acetaminophen (TYLENOL) 500 MG tablet Take 2 tablets by mouth every 6 hours as needed for Pain or Fever, Disp-40 tablet, R-0Normal      capsaicin (ZOSTRIX) 0.025 % cream Apply topically 3 times daily as needed (pain), Topical, 3 TIMES DAILY PRN Starting Mon 6/9/2025, Disp-60 g, R-0, Normal      diclofenac sodium (VOLTAREN) 1 % GEL Apply 4 g topically 4 times daily for 6 days, Topical, 4 TIMES DAILY Starting Mon 6/9/2025, Until Sun 6/15/2025, For 6 days, Disp-100 g, R-0, Normal               (Please note that portions of this note were completed with a transcription program.  Efforts were made to edit the dictations but occasionally words are mis-transcribed.)    Gautam Elizondo MD (electronically signed)            Gautam Elizondo MD  06/10/25 [id number]

## 2025-06-10 NOTE — ED TRIAGE NOTES
Patient states she fell though her deck yesterday night and is now having left foot and ankle pain, denies hitting her head or blood thinners denies LOC. Patient took 1200mg ibuprofen PTA.

## (undated) DEVICE — SUTURE VCRL SZ 0 L36IN ABSRB VLT L40MM CT 1/2 CIR J358H

## (undated) DEVICE — SUTURE 0 L36IN ABSRB BRN CT L40MM 1/2 CIR TAPERPOINT SGL 914H

## (undated) DEVICE — STERILE POLYISOPRENE POWDER-FREE SURGICAL GLOVES: Brand: PROTEXIS

## (undated) DEVICE — Z DUP USE 2227076 BARRIER ADH TISS 4-SECTION SEPRAFILM

## (undated) DEVICE — REM POLYHESIVE ADULT PATIENT RETURN ELECTRODE: Brand: VALLEYLAB

## (undated) DEVICE — ROCKER SWITCH PENCIL HOLSTER: Brand: VALLEYLAB

## (undated) DEVICE — DERMABOND SKIN ADH 0.7ML -- DERMABOND ADVANCED 12/BX

## (undated) DEVICE — 3M™ MEDIPORE™ H SOFT CLOTH SURGICAL TAPE, 2863, 3 IN X 10 YD, 12/CASE: Brand: 3M™ MEDIPORE™

## (undated) DEVICE — C-SECTION II-LF: Brand: MEDLINE INDUSTRIES, INC.

## (undated) DEVICE — TELFA ADHESIVE ISLAND DRESSING: Brand: TELFA

## (undated) DEVICE — TOWEL,OR,DSP,ST,BLUE,STD,2/PK,40PK/CS: Brand: MEDLINE

## (undated) DEVICE — BLADE ASSEMB CLP HAIR FINE --

## (undated) DEVICE — STERILE LATEX POWDER-FREE SURGICAL GLOVESWITH NITRILE COATING: Brand: PROTEXIS

## (undated) DEVICE — KENDALL SCD EXPRESS SLEEVES, KNEE LENGTH, MEDIUM: Brand: KENDALL SCD

## (undated) DEVICE — (D)PREP SKN CHLRAPRP APPL 26ML -- CONVERT TO ITEM 371833

## (undated) DEVICE — 3000CC GUARDIAN II: Brand: GUARDIAN

## (undated) DEVICE — TIP CLEANER: Brand: VALLEYLAB